# Patient Record
Sex: FEMALE | Race: WHITE | NOT HISPANIC OR LATINO | Employment: FULL TIME | URBAN - METROPOLITAN AREA
[De-identification: names, ages, dates, MRNs, and addresses within clinical notes are randomized per-mention and may not be internally consistent; named-entity substitution may affect disease eponyms.]

---

## 2017-01-31 ENCOUNTER — APPOINTMENT (OUTPATIENT)
Dept: LAB | Facility: MEDICAL CENTER | Age: 57
End: 2017-01-31
Payer: COMMERCIAL

## 2017-01-31 ENCOUNTER — TRANSCRIBE ORDERS (OUTPATIENT)
Dept: ADMINISTRATIVE | Facility: HOSPITAL | Age: 57
End: 2017-01-31

## 2017-01-31 DIAGNOSIS — R53.83 OTHER MALAISE AND FATIGUE: ICD-10-CM

## 2017-01-31 DIAGNOSIS — R53.81 OTHER MALAISE AND FATIGUE: Primary | ICD-10-CM

## 2017-01-31 DIAGNOSIS — R53.83 OTHER MALAISE AND FATIGUE: Primary | ICD-10-CM

## 2017-01-31 DIAGNOSIS — R53.81 OTHER MALAISE AND FATIGUE: ICD-10-CM

## 2017-01-31 DIAGNOSIS — E78.5 HYPERLIPIDEMIA, UNSPECIFIED HYPERLIPIDEMIA TYPE: ICD-10-CM

## 2017-01-31 LAB
25(OH)D3 SERPL-MCNC: 10 NG/ML (ref 30–100)
ALBUMIN SERPL BCP-MCNC: 3.9 G/DL (ref 3.5–5)
ALP SERPL-CCNC: 63 U/L (ref 46–116)
ALT SERPL W P-5'-P-CCNC: 35 U/L (ref 12–78)
ANION GAP SERPL CALCULATED.3IONS-SCNC: 6 MMOL/L (ref 4–13)
AST SERPL W P-5'-P-CCNC: 22 U/L (ref 5–45)
BACTERIA UR QL AUTO: ABNORMAL /HPF
BASOPHILS # BLD AUTO: 0.01 THOUSANDS/ΜL (ref 0–0.1)
BASOPHILS NFR BLD AUTO: 0 % (ref 0–1)
BILIRUB SERPL-MCNC: 0.59 MG/DL (ref 0.2–1)
BILIRUB UR QL STRIP: NEGATIVE
BUN SERPL-MCNC: 12 MG/DL (ref 5–25)
CALCIUM SERPL-MCNC: 8.4 MG/DL (ref 8.3–10.1)
CHLORIDE SERPL-SCNC: 107 MMOL/L (ref 100–108)
CHOLEST SERPL-MCNC: 208 MG/DL (ref 50–200)
CLARITY UR: CLEAR
CO2 SERPL-SCNC: 29 MMOL/L (ref 21–32)
COLOR UR: YELLOW
CREAT SERPL-MCNC: 0.76 MG/DL (ref 0.6–1.3)
EOSINOPHIL # BLD AUTO: 0.14 THOUSAND/ΜL (ref 0–0.61)
EOSINOPHIL NFR BLD AUTO: 3 % (ref 0–6)
ERYTHROCYTE [DISTWIDTH] IN BLOOD BY AUTOMATED COUNT: 13.4 % (ref 11.6–15.1)
GFR SERPL CREATININE-BSD FRML MDRD: >60 ML/MIN/1.73SQ M
GLUCOSE SERPL-MCNC: 89 MG/DL (ref 65–140)
GLUCOSE UR STRIP-MCNC: NEGATIVE MG/DL
HCT VFR BLD AUTO: 38.3 % (ref 34.8–46.1)
HDLC SERPL-MCNC: 56 MG/DL (ref 40–60)
HGB BLD-MCNC: 12.8 G/DL (ref 11.5–15.4)
HGB UR QL STRIP.AUTO: ABNORMAL
KETONES UR STRIP-MCNC: NEGATIVE MG/DL
LDLC SERPL CALC-MCNC: 127 MG/DL (ref 0–100)
LEUKOCYTE ESTERASE UR QL STRIP: ABNORMAL
LYMPHOCYTES # BLD AUTO: 1.86 THOUSANDS/ΜL (ref 0.6–4.47)
LYMPHOCYTES NFR BLD AUTO: 35 % (ref 14–44)
MCH RBC QN AUTO: 30.4 PG (ref 26.8–34.3)
MCHC RBC AUTO-ENTMCNC: 33.4 G/DL (ref 31.4–37.4)
MCV RBC AUTO: 91 FL (ref 82–98)
MONOCYTES # BLD AUTO: 0.35 THOUSAND/ΜL (ref 0.17–1.22)
MONOCYTES NFR BLD AUTO: 7 % (ref 4–12)
MUCOUS THREADS UR QL AUTO: ABNORMAL
NEUTROPHILS # BLD AUTO: 2.93 THOUSANDS/ΜL (ref 1.85–7.62)
NEUTS SEG NFR BLD AUTO: 55 % (ref 43–75)
NITRITE UR QL STRIP: NEGATIVE
NON-SQ EPI CELLS URNS QL MICRO: ABNORMAL /HPF
NRBC BLD AUTO-RTO: 0 /100 WBCS
PH UR STRIP.AUTO: 6 [PH] (ref 4.5–8)
PLATELET # BLD AUTO: 247 THOUSANDS/UL (ref 149–390)
PMV BLD AUTO: 10.4 FL (ref 8.9–12.7)
POTASSIUM SERPL-SCNC: 4.2 MMOL/L (ref 3.5–5.3)
PROT SERPL-MCNC: 7.2 G/DL (ref 6.4–8.2)
PROT UR STRIP-MCNC: NEGATIVE MG/DL
RBC # BLD AUTO: 4.21 MILLION/UL (ref 3.81–5.12)
RBC #/AREA URNS AUTO: ABNORMAL /HPF
SODIUM SERPL-SCNC: 142 MMOL/L (ref 136–145)
SP GR UR STRIP.AUTO: 1.02 (ref 1–1.03)
T3 SERPL-MCNC: 1.1 NG/ML (ref 0.6–1.8)
T4 FREE SERPL-MCNC: 1.03 NG/DL (ref 0.76–1.46)
TRIGL SERPL-MCNC: 127 MG/DL
TSH SERPL DL<=0.05 MIU/L-ACNC: 2.28 UIU/ML (ref 0.36–3.74)
URATE SERPL-MCNC: 5.7 MG/DL (ref 2–6.8)
UROBILINOGEN UR QL STRIP.AUTO: 0.2 E.U./DL
WBC # BLD AUTO: 5.3 THOUSAND/UL (ref 4.31–10.16)
WBC #/AREA URNS AUTO: ABNORMAL /HPF

## 2017-01-31 PROCEDURE — 87086 URINE CULTURE/COLONY COUNT: CPT | Performed by: FAMILY MEDICINE

## 2017-01-31 PROCEDURE — 36415 COLL VENOUS BLD VENIPUNCTURE: CPT

## 2017-01-31 PROCEDURE — 82306 VITAMIN D 25 HYDROXY: CPT

## 2017-01-31 PROCEDURE — 81001 URINALYSIS AUTO W/SCOPE: CPT | Performed by: FAMILY MEDICINE

## 2017-01-31 PROCEDURE — 80061 LIPID PANEL: CPT

## 2017-01-31 PROCEDURE — 84443 ASSAY THYROID STIM HORMONE: CPT

## 2017-01-31 PROCEDURE — 84480 ASSAY TRIIODOTHYRONINE (T3): CPT

## 2017-01-31 PROCEDURE — 84550 ASSAY OF BLOOD/URIC ACID: CPT

## 2017-01-31 PROCEDURE — 85025 COMPLETE CBC W/AUTO DIFF WBC: CPT

## 2017-01-31 PROCEDURE — 80053 COMPREHEN METABOLIC PANEL: CPT

## 2017-01-31 PROCEDURE — 84439 ASSAY OF FREE THYROXINE: CPT

## 2017-02-01 ENCOUNTER — TRANSCRIBE ORDERS (OUTPATIENT)
Dept: ADMINISTRATIVE | Facility: HOSPITAL | Age: 57
End: 2017-02-01

## 2017-02-01 DIAGNOSIS — M81.0 OSTEOPOROSIS: ICD-10-CM

## 2017-02-01 DIAGNOSIS — G47.30 SLEEP APNEA IN ADULT: Primary | ICD-10-CM

## 2017-02-02 LAB — BACTERIA UR CULT: NORMAL

## 2017-02-15 ENCOUNTER — HOSPITAL ENCOUNTER (OUTPATIENT)
Dept: RADIOLOGY | Age: 57
Discharge: HOME | End: 2017-02-15
Payer: COMMERCIAL

## 2017-02-15 DIAGNOSIS — M81.0 OSTEOPOROSIS: ICD-10-CM

## 2017-02-15 PROCEDURE — 77080 DXA BONE DENSITY AXIAL: CPT

## 2017-03-15 ENCOUNTER — HOSPITAL ENCOUNTER (OUTPATIENT)
Dept: SLEEP CENTER | Facility: CLINIC | Age: 57
Discharge: HOME | End: 2017-03-15
Payer: COMMERCIAL

## 2017-03-15 DIAGNOSIS — G47.30 SLEEP APNEA IN ADULT: ICD-10-CM

## 2017-03-15 PROCEDURE — 95810 POLYSOM 6/> YRS 4/> PARAM: CPT

## 2017-03-20 ENCOUNTER — TRANSCRIBE ORDERS (OUTPATIENT)
Dept: SLEEP CENTER | Facility: CLINIC | Age: 57
End: 2017-03-20

## 2017-03-20 DIAGNOSIS — G47.33 OSA (OBSTRUCTIVE SLEEP APNEA): Primary | ICD-10-CM

## 2017-04-12 ENCOUNTER — HOSPITAL ENCOUNTER (OUTPATIENT)
Dept: SLEEP CENTER | Facility: CLINIC | Age: 57
Discharge: HOME | End: 2017-04-12
Payer: COMMERCIAL

## 2017-04-12 DIAGNOSIS — G47.33 OSA (OBSTRUCTIVE SLEEP APNEA): ICD-10-CM

## 2017-04-12 PROCEDURE — 95811 POLYSOM 6/>YRS CPAP 4/> PARM: CPT

## 2017-04-18 ENCOUNTER — HOSPITAL ENCOUNTER (OUTPATIENT)
Dept: SLEEP CENTER | Facility: CLINIC | Age: 57
Discharge: HOME | End: 2017-04-18
Payer: COMMERCIAL

## 2017-04-18 ENCOUNTER — TRANSCRIBE ORDERS (OUTPATIENT)
Dept: SLEEP CENTER | Facility: CLINIC | Age: 57
End: 2017-04-18

## 2017-04-18 DIAGNOSIS — G47.33 OSA (OBSTRUCTIVE SLEEP APNEA): ICD-10-CM

## 2017-04-18 DIAGNOSIS — G47.33 OSA (OBSTRUCTIVE SLEEP APNEA): Primary | ICD-10-CM

## 2017-04-26 ENCOUNTER — APPOINTMENT (OUTPATIENT)
Dept: LAB | Facility: MEDICAL CENTER | Age: 57
End: 2017-04-26
Payer: COMMERCIAL

## 2017-04-26 ENCOUNTER — TRANSCRIBE ORDERS (OUTPATIENT)
Dept: ADMINISTRATIVE | Facility: HOSPITAL | Age: 57
End: 2017-04-26

## 2017-04-26 DIAGNOSIS — E78.5 OTHER AND UNSPECIFIED HYPERLIPIDEMIA: Primary | ICD-10-CM

## 2017-04-26 LAB
CHOLEST SERPL-MCNC: 202 MG/DL (ref 50–200)
HDLC SERPL-MCNC: 50 MG/DL (ref 40–60)
LDLC SERPL CALC-MCNC: 114 MG/DL (ref 0–100)
TRIGL SERPL-MCNC: 192 MG/DL

## 2017-04-26 PROCEDURE — 80061 LIPID PANEL: CPT | Performed by: FAMILY MEDICINE

## 2017-04-26 PROCEDURE — 36415 COLL VENOUS BLD VENIPUNCTURE: CPT | Performed by: FAMILY MEDICINE

## 2017-05-10 ENCOUNTER — ALLSCRIPTS OFFICE VISIT (OUTPATIENT)
Dept: OTHER | Facility: OTHER | Age: 57
End: 2017-05-10

## 2017-05-10 DIAGNOSIS — R14.0 ABDOMINAL DISTENSION (GASEOUS): ICD-10-CM

## 2017-05-10 DIAGNOSIS — E55.9 VITAMIN D DEFICIENCY: ICD-10-CM

## 2017-05-11 ENCOUNTER — APPOINTMENT (OUTPATIENT)
Dept: LAB | Facility: MEDICAL CENTER | Age: 57
End: 2017-05-11
Payer: COMMERCIAL

## 2017-05-11 DIAGNOSIS — E55.9 VITAMIN D DEFICIENCY: ICD-10-CM

## 2017-05-11 LAB — 25(OH)D3 SERPL-MCNC: 12.8 NG/ML (ref 30–100)

## 2017-05-11 PROCEDURE — 36415 COLL VENOUS BLD VENIPUNCTURE: CPT

## 2017-05-11 PROCEDURE — 82306 VITAMIN D 25 HYDROXY: CPT

## 2017-05-12 ENCOUNTER — GENERIC CONVERSION - ENCOUNTER (OUTPATIENT)
Dept: OTHER | Facility: OTHER | Age: 57
End: 2017-05-12

## 2017-05-22 ENCOUNTER — ALLSCRIPTS OFFICE VISIT (OUTPATIENT)
Dept: OTHER | Facility: OTHER | Age: 57
End: 2017-05-22

## 2017-06-22 ENCOUNTER — HOSPITAL ENCOUNTER (OUTPATIENT)
Dept: SLEEP CENTER | Facility: CLINIC | Age: 57
Discharge: HOME | End: 2017-06-22
Payer: COMMERCIAL

## 2017-06-22 ENCOUNTER — TRANSCRIBE ORDERS (OUTPATIENT)
Dept: SLEEP CENTER | Facility: CLINIC | Age: 57
End: 2017-06-22

## 2017-06-22 DIAGNOSIS — G47.33 OSA (OBSTRUCTIVE SLEEP APNEA): ICD-10-CM

## 2017-06-22 DIAGNOSIS — J44.9 OSA AND COPD OVERLAP SYNDROME (HCC): Primary | ICD-10-CM

## 2017-06-22 DIAGNOSIS — G47.33 OSA AND COPD OVERLAP SYNDROME (HCC): Primary | ICD-10-CM

## 2017-07-09 ENCOUNTER — ANESTHESIA EVENT (OUTPATIENT)
Dept: GASTROENTEROLOGY | Facility: HOSPITAL | Age: 57
End: 2017-07-09
Payer: COMMERCIAL

## 2017-07-10 ENCOUNTER — GENERIC CONVERSION - ENCOUNTER (OUTPATIENT)
Dept: OTHER | Facility: OTHER | Age: 57
End: 2017-07-10

## 2017-07-10 ENCOUNTER — HOSPITAL ENCOUNTER (OUTPATIENT)
Facility: HOSPITAL | Age: 57
Setting detail: OUTPATIENT SURGERY
Discharge: HOME | End: 2017-07-10
Attending: INTERNAL MEDICINE | Admitting: INTERNAL MEDICINE
Payer: COMMERCIAL

## 2017-07-10 ENCOUNTER — ANESTHESIA (OUTPATIENT)
Dept: GASTROENTEROLOGY | Facility: HOSPITAL | Age: 57
End: 2017-07-10
Payer: COMMERCIAL

## 2017-07-10 VITALS
BODY MASS INDEX: 26.72 KG/M2 | HEIGHT: 64 IN | RESPIRATION RATE: 16 BRPM | TEMPERATURE: 97.7 F | SYSTOLIC BLOOD PRESSURE: 98 MMHG | WEIGHT: 156.53 LBS | OXYGEN SATURATION: 97 % | HEART RATE: 62 BPM | DIASTOLIC BLOOD PRESSURE: 58 MMHG

## 2017-07-10 DIAGNOSIS — Z12.11 ENCOUNTER FOR SCREENING FOR MALIGNANT NEOPLASM OF COLON: ICD-10-CM

## 2017-07-10 PROCEDURE — 88305 TISSUE EXAM BY PATHOLOGIST: CPT | Performed by: INTERNAL MEDICINE

## 2017-07-10 RX ORDER — SODIUM CHLORIDE, SODIUM LACTATE, POTASSIUM CHLORIDE, CALCIUM CHLORIDE 600; 310; 30; 20 MG/100ML; MG/100ML; MG/100ML; MG/100ML
125 INJECTION, SOLUTION INTRAVENOUS CONTINUOUS
Status: DISCONTINUED | OUTPATIENT
Start: 2017-07-10 | End: 2017-07-10 | Stop reason: HOSPADM

## 2017-07-10 RX ORDER — PROPOFOL 10 MG/ML
INJECTION, EMULSION INTRAVENOUS CONTINUOUS PRN
Status: DISCONTINUED | OUTPATIENT
Start: 2017-07-10 | End: 2017-07-10 | Stop reason: SURG

## 2017-07-10 RX ORDER — PROPOFOL 10 MG/ML
INJECTION, EMULSION INTRAVENOUS AS NEEDED
Status: DISCONTINUED | OUTPATIENT
Start: 2017-07-10 | End: 2017-07-10 | Stop reason: SURG

## 2017-07-10 RX ADMIN — PROPOFOL 30 MG: 10 INJECTION, EMULSION INTRAVENOUS at 12:27

## 2017-07-10 RX ADMIN — SODIUM CHLORIDE, SODIUM LACTATE, POTASSIUM CHLORIDE, AND CALCIUM CHLORIDE 125 ML/HR: .6; .31; .03; .02 INJECTION, SOLUTION INTRAVENOUS at 11:53

## 2017-07-10 RX ADMIN — PROPOFOL 120 MCG/KG/MIN: 10 INJECTION, EMULSION INTRAVENOUS at 12:14

## 2017-07-10 RX ADMIN — PROPOFOL 120 MG: 10 INJECTION, EMULSION INTRAVENOUS at 12:12

## 2017-07-11 ENCOUNTER — APPOINTMENT (OUTPATIENT)
Dept: LAB | Facility: MEDICAL CENTER | Age: 57
End: 2017-07-11
Payer: COMMERCIAL

## 2017-07-11 DIAGNOSIS — R14.0 ABDOMINAL DISTENSION (GASEOUS): ICD-10-CM

## 2017-07-11 PROCEDURE — 36415 COLL VENOUS BLD VENIPUNCTURE: CPT

## 2017-07-11 PROCEDURE — 83516 IMMUNOASSAY NONANTIBODY: CPT

## 2017-07-11 PROCEDURE — 82784 ASSAY IGA/IGD/IGG/IGM EACH: CPT

## 2017-07-11 PROCEDURE — 86255 FLUORESCENT ANTIBODY SCREEN: CPT

## 2017-07-13 LAB
ENDOMYSIUM IGA SER QL: NEGATIVE
GLIADIN PEPTIDE IGA SER-ACNC: 3 UNITS (ref 0–19)
GLIADIN PEPTIDE IGG SER-ACNC: 2 UNITS (ref 0–19)
IGA SERPL-MCNC: 107 MG/DL (ref 87–352)
TTG IGA SER-ACNC: <2 U/ML (ref 0–3)
TTG IGG SER-ACNC: <2 U/ML (ref 0–5)

## 2017-07-19 ENCOUNTER — GENERIC CONVERSION - ENCOUNTER (OUTPATIENT)
Dept: OTHER | Facility: OTHER | Age: 57
End: 2017-07-19

## 2017-08-12 DIAGNOSIS — E55.9 VITAMIN D DEFICIENCY: ICD-10-CM

## 2017-08-24 ENCOUNTER — GENERIC CONVERSION - ENCOUNTER (OUTPATIENT)
Dept: OTHER | Facility: OTHER | Age: 57
End: 2017-08-24

## 2017-11-27 ENCOUNTER — LAB REQUISITION (OUTPATIENT)
Dept: LAB | Facility: HOSPITAL | Age: 57
End: 2017-11-27
Payer: COMMERCIAL

## 2017-11-27 ENCOUNTER — ALLSCRIPTS OFFICE VISIT (OUTPATIENT)
Dept: OTHER | Facility: OTHER | Age: 57
End: 2017-11-27

## 2017-11-27 DIAGNOSIS — Z01.419 ENCOUNTER FOR GYNECOLOGICAL EXAMINATION WITHOUT ABNORMAL FINDING: ICD-10-CM

## 2017-11-27 DIAGNOSIS — Z12.4 ENCOUNTER FOR SCREENING FOR MALIGNANT NEOPLASM OF CERVIX: ICD-10-CM

## 2017-11-27 PROCEDURE — G0145 SCR C/V CYTO,THINLAYER,RESCR: HCPCS | Performed by: OBSTETRICS & GYNECOLOGY

## 2017-11-27 PROCEDURE — 87624 HPV HI-RISK TYP POOLED RSLT: CPT | Performed by: OBSTETRICS & GYNECOLOGY

## 2017-12-01 LAB — HPV RRNA GENITAL QL NAA+PROBE: NORMAL

## 2017-12-04 ENCOUNTER — GENERIC CONVERSION - ENCOUNTER (OUTPATIENT)
Dept: OTHER | Facility: OTHER | Age: 57
End: 2017-12-04

## 2017-12-04 LAB
LAB AP GYN PRIMARY INTERPRETATION: NORMAL
Lab: NORMAL

## 2017-12-07 ENCOUNTER — TRANSCRIBE ORDERS (OUTPATIENT)
Dept: URGENT CARE | Facility: MEDICAL CENTER | Age: 57
End: 2017-12-07

## 2017-12-07 ENCOUNTER — GENERIC CONVERSION - ENCOUNTER (OUTPATIENT)
Dept: OTHER | Facility: OTHER | Age: 57
End: 2017-12-07

## 2017-12-07 ENCOUNTER — APPOINTMENT (OUTPATIENT)
Dept: RADIOLOGY | Facility: MEDICAL CENTER | Age: 57
End: 2017-12-07
Payer: COMMERCIAL

## 2017-12-07 DIAGNOSIS — M70.60 TENDINITIS OF TROCHANTERIC REGION OF HIP, UNSPECIFIED LATERALITY: Primary | ICD-10-CM

## 2017-12-07 DIAGNOSIS — M81.0 SENILE OSTEOPOROSIS: ICD-10-CM

## 2017-12-07 PROCEDURE — 73630 X-RAY EXAM OF FOOT: CPT

## 2017-12-07 PROCEDURE — 73130 X-RAY EXAM OF HAND: CPT

## 2017-12-07 PROCEDURE — 73521 X-RAY EXAM HIPS BI 2 VIEWS: CPT

## 2017-12-07 PROCEDURE — 73110 X-RAY EXAM OF WRIST: CPT

## 2017-12-18 ENCOUNTER — HOSPITAL ENCOUNTER (OUTPATIENT)
Dept: RADIOLOGY | Age: 57
Discharge: HOME | End: 2017-12-18
Payer: COMMERCIAL

## 2017-12-18 DIAGNOSIS — Z12.4 ENCOUNTER FOR SCREENING FOR MALIGNANT NEOPLASM OF CERVIX: ICD-10-CM

## 2017-12-18 PROCEDURE — G0202 SCR MAMMO BI INCL CAD: HCPCS

## 2017-12-19 ENCOUNTER — GENERIC CONVERSION - ENCOUNTER (OUTPATIENT)
Dept: OTHER | Facility: OTHER | Age: 57
End: 2017-12-19

## 2018-01-12 NOTE — RESULT NOTES
Verified Results  (1) VITAMIN D 25-HYDROXY [id number] 07:53AM Otilio Shabazz Order Number: [id number]     Test Name Result Flag Reference   VIT D 25-HYDROX 12 8 ng/mL L 30 0-100 0   This assay is a certified procedure of the CDC Vitamin D Standardization Certification Program (VDSCP)     Deficiency <20ng/ml   Insufficiency 20-30ng/ml   Sufficient  ng/ml     *Patients undergoing fluorescein dye angiography may retain small amounts of fluorescein in the body for 48-72 hours post procedure  Samples containing fluorescein can produce falsely elevated Vitamin D values  If the patient had this procedure, a specimen should be resubmitted post fluorescein clearance  Plan  Osteoporosis    · Alendronate Sodium 70 MG Oral Tablet; TAKE 1 TABLET ONCE WEEKLY  Vitamin D deficiency    · Vitamin D (Ergocalciferol) 01278 UNIT Oral Capsule; TAKE 1 CAPSULE WEEKLY   · (1) VITAMIN D 25-HYDROXY; Status:Active;  Requested for:[id number];

## 2018-01-14 VITALS
WEIGHT: 158.5 LBS | HEART RATE: 76 BPM | SYSTOLIC BLOOD PRESSURE: 132 MMHG | BODY MASS INDEX: 27.06 KG/M2 | HEIGHT: 64 IN | DIASTOLIC BLOOD PRESSURE: 68 MMHG | RESPIRATION RATE: 18 BRPM

## 2018-01-14 VITALS
WEIGHT: 160 LBS | BODY MASS INDEX: 27.46 KG/M2 | HEART RATE: 77 BPM | DIASTOLIC BLOOD PRESSURE: 58 MMHG | TEMPERATURE: 98.4 F | OXYGEN SATURATION: 98 % | SYSTOLIC BLOOD PRESSURE: 118 MMHG

## 2018-01-18 NOTE — RESULT NOTES
Discussion/Summary   Please inform the patient that one polyp removed was a tubular adenoma, there was no high-grade dysplasia and no cancer  A tubular adenoma is a precancerous polyp  These polyps, if left alone, have a small risk of developing a cancer over 5-10 years  Your polyp has been completely removed  Small polyp in the rectum was a benign hyperplastic polyp  I recommend repeat colonoscopy in 5 years, please have the patient call with any questions or concerns  Verified Results  (1) TISSUE EXAM [id number] 12:22PM Giancarlo Jen     Test Name Result Flag Reference   LAB AP CASE REPORT (Report)     Surgical Pathology Report             Case: [id number]                   Authorizing Provider: Teressa Kovacs MD      Collected:      07/10/2017 1222        Ordering Location:   [facility]    Received:      07/10/2017 [address] [place] [address] Ct Endoscopy                               Pathologist:      Fabiola Fuentes MD                                Specimens:  A) - Polyp, Colorectal, Bx Ascending colon polyp                            B) - Polyp, Colorectal, Rectum   LAB AP FINAL DIAGNOSIS (Report)     A  Polyp, Colorectal, Biopsy polypectomy Ascending colon polyp:  - Tubular adenoma  - Negative for high grade dysplasia and malignancy  B  Polyp, Colorectal, Rectum polypectomy:  - Hyperplastic polyp   - Negative for dysplasia and malignancy  Interpretation performed at [facility], [address]  Electronically signed by Fabiola Fuentes MD on 7/13/2017 at 6:07 PM   LAB AP SURGICAL ADDITIONAL INFORMATION (Report)     These tests were developed and their performance characteristics   determined by [facility] or [facility] [employer]  They may not be cleared or approved by the [place]  Food and   Drug Administration  The FDA has determined that such clearance or   approval is not necessary   These tests are used for clinical purposes  They should not be regarded as investigational or for research  This   laboratory has been approved by [place], designated as a high-complexity   laboratory and is qualified to perform these tests  LAB AP GROSS DESCRIPTION (Report)     A  The specimen is received in formalin, labeled with the patient's name   and hospital number, and is designated biopsy ascending colon polyp, are   three irregularly shaped fragments of tan soft tissue each measuring 0 2   cm in greatest dimension  Entirely submitted  One cassette  B  The specimen is received in formalin, labeled with the patient's name   and hospital number, and is designated rectum, is a single irregularly   shaped fragment of tan soft tissue measuring 0 2 cm in greatest dimension  Entirely submitted  One cassette  Note: The estimated total formalin fixation time based upon information   provided by the submitting clinician and the standard processing schedule   is 14 0 hours  RLR   LAB AP CLINICAL INFORMATION      A single diminutive polyp found in the ascending colon;   removed by cold biopsy polypectomy  A single polyp found in the rectum;   removed by cold snare polypectomy

## 2018-01-22 VITALS
HEART RATE: 81 BPM | HEIGHT: 63 IN | BODY MASS INDEX: 28.62 KG/M2 | WEIGHT: 161.5 LBS | DIASTOLIC BLOOD PRESSURE: 60 MMHG | SYSTOLIC BLOOD PRESSURE: 100 MMHG

## 2018-01-23 NOTE — RESULT NOTES
Verified Results  * MAMMO SCREENING BILATERAL W CAD [id number] 02:31PM Tamanna Zaidi Order Number: [id number]    - Patient Instructions: To schedule this appointment, please contact Central Scheduling at [phone]  Do not wear any perfume, powder, lotion or deodorant on breast or underarm area  Please bring your doctors order, referral (if needed) and insurance information with you on the day of the test  Failure to bring this information may result in this test being rescheduled  Arrive 15 minutes prior to your appointment time to register  On the day of your test, please bring any prior mammogram or breast studies with you that were not performed at a [facility]  Failure to bring prior exams may result in your test needing to be rescheduled  Test Name Result Flag Reference   MAMMO SCREENING BILATERAL W CAD (Report)     Patient History:   Patient is postmenopausal, has history of cancer in the right    breast at age 50, and had previous chest radiation therapy at age   50  Family history of premenopausal breast cancer at age 40 in    sister, breast cancer in maternal aunt, breast cancer in maternal   cousin, prostate cancer at age 79 in father  Radiation therapy of the right breast, March 2009  Malignant    excisional biopsy of the right breast, February 6, 2009  Took hormonal contraceptives for 10 years  Took tamoxifen for 5    years  Took unspecified hormones for 5 years  Patient has never smoked  Patient's BMI is 27 3  Reason for exam: screening, asymptomatic  Mammo Screening Bilateral W CAD: December 18, 2017 - Check In #:    [de-identified]   Bilateral MLO and CC view(s) were taken  XCCL view(s) were taken   of the right breast      Technologist: DINESH Darnell (DINESH)(M)   Prior study comparison: December 12, 2016, mammo screening    bilateral W CAD performed at [address] [place] [address]   June 8, 2016, mammo diagnostic left W CAD, performed at [facility] [address]  December 9, 2015, bilateral [facility] digtl    scrn mammo w/CAD, performed at [address]  May 14, 2014, bilateral WB digitl bilat sh[place]e, performed    at [address]  May 13, 2013, bilateral WB   digitl bilat shanae, performed at [address]  May 10, 2012, bilateral WB digitl bilat shanae, performed    at [address]  The breast tissue is almost entirely fat  Bilateral digital mammography was performed  No dominant soft    tissue mass, architectural distortion or suspicious    calcifications are noted in either breast  The skin and nipple    contours are within normal limits  No evidence of malignancy  No significant changes when compared with prior studies  ACR BI-RADSï¾® Assessments: BiRad:1 - Negative     Recommendation:   Routine screening mammogram of both breasts in 1 year  Analyzed by CAD     The patient is scheduled in a reminder system for screening    mammography  8-10% of cancers will be missed on mammography  Management of a    palpable abnormality must be based on clinical grounds  Patients   will be notified of their results via letter from our facility  Accredited by [employer] of Radiology and FDA  Transcription Location:  [place] [address]: [id number]     Risk Value(s):   Myriad Table: 10 4%, MRS : Based on personal and/or    family history, consideration of hereditary risk assessment may    be warranted     Signed by:   Travis Cade MD   12/19/17       Signatures   Electronically signed by : LEANN Moran ; Dec 19 2017  4:08PM EST                       (Author)

## 2018-01-23 NOTE — RESULT NOTES
Verified Results  (1) THIN PREP PAP WITH IMAGING 27Nov2017 09:01AM Bina Cedillo Order Number: [id number]     Test Name Result Flag Reference   LAB AP CASE REPORT (Report)     Gynecologic Cytology Report            Case: [id number]                  Authorizing Provider: Becka Moreno MD    Collected:      11/27/2017 0901        First Screen:     YURY Swan    Received:      11/29/2017 1404        Specimen:  LIQUID-BASED PAP, SCREENING, Endocervical   HPV HIGH RISK RESULT (Report)     HPV, High Risk: HPV NEG, HPV16 NEG, HPV18 NEG      Other High Risk HPV Negative, HPV 16 Negative, HPV 18 Negative  HPV types: 16,18,31,33,35,39,45,51,52,56,58,59,66 and 68 DNA are undetectable or below the pre-set threshold  [employer] FDA approved Steven 4800 is utilized with strict adherence to the manufacturers instruction  manual to test for the presence of High-Risk HPV DNA, as well as HPV 16 and HPV 18  This instrument  has been validated by our laboratory and/or by the   A negative result does not preclude the presence of HPV infection because results depend on adequate  specimen collection, absence of inhibitors and sufficient DNA to be detected  Additionally, HPV negative  results are not intended to prevent women from proceeding to colposcopy if clinically warranted  Positive HPV test results indicate the presence of any one or more of the high risk types, but since patients  are often co-infected with low-risk types it does not rule out the presence of low-risk types in patients  with mixed infections  LAB AP GYN PRIMARY INTERPRETATION      Negative for intraepithelial lesion or malignancy  Electronically signed by YURY Swan on 12/4/2017 at 1:35 PM   LAB AP GYN SPECIMEN ADEQUACY      Satisfactory for evaluation  Endocervical/transformation zone component present     LAB AP GYN ADDITIONAL INFORMATION (Report)     [employer] FDA approved ,  and ThinPrep Imaging System are   utilized with strict adherence to the 's instruction manual to   prepare gynecologic and non-gynecologic cytology specimens for the   production of ThinPrep slides as well as for gynecologic ThinPrep imaging  These processes have been validated by our laboratory and/or by the     The Pap test is not a diagnostic procedure and should not be used as the   sole means to detect cervical cancer  It is only a screening procedure to   aid in the detection of cervical cancer and its precursors  Both   false-negative and false-positive results have been experienced  Your   patient's test result should be interpreted in this context together with   the history and clinical findings         Signatures   Electronically signed by : LEANN Mendez ; Dec  4 2017  3:56PM EST                       (Author)

## 2018-01-23 NOTE — MISCELLANEOUS
Dear Alyson Horn,  I am happy to report that your Pap test collected during your recent  exam was normal  The HPV test was negative  Based on the most recent guidelines, you will be due for your next Pap test and HPV testing in 3 years  However, I will continue to see you annually for your [facility] visit  If you have any questions,  please do not hesitate to contact my office      Sincerely,    Juan Duran MD

## 2018-03-10 ENCOUNTER — TRANSCRIBE ORDERS (OUTPATIENT)
Dept: ADMINISTRATIVE | Facility: HOSPITAL | Age: 58
End: 2018-03-10

## 2018-03-10 ENCOUNTER — APPOINTMENT (OUTPATIENT)
Dept: LAB | Facility: MEDICAL CENTER | Age: 58
End: 2018-03-10
Payer: COMMERCIAL

## 2018-03-10 DIAGNOSIS — M81.0 SENILE OSTEOPOROSIS: Primary | ICD-10-CM

## 2018-03-10 LAB
25(OH)D3 SERPL-MCNC: 83.6 NG/ML (ref 30–100)
CALCIUM SERPL-MCNC: 8.9 MG/DL (ref 8.3–10.1)
PTH-INTACT SERPL-MCNC: 64.8 PG/ML (ref 14–72)

## 2018-03-10 PROCEDURE — 83970 ASSAY OF PARATHORMONE: CPT | Performed by: INTERNAL MEDICINE

## 2018-03-10 PROCEDURE — 36415 COLL VENOUS BLD VENIPUNCTURE: CPT | Performed by: INTERNAL MEDICINE

## 2018-03-10 PROCEDURE — 82306 VITAMIN D 25 HYDROXY: CPT | Performed by: INTERNAL MEDICINE

## 2018-03-10 PROCEDURE — 82310 ASSAY OF CALCIUM: CPT | Performed by: INTERNAL MEDICINE

## 2018-05-08 ENCOUNTER — TELEPHONE (OUTPATIENT)
Dept: SLEEP CENTER | Facility: CLINIC | Age: 58
End: 2018-05-08

## 2018-05-08 DIAGNOSIS — G47.33 OSA (OBSTRUCTIVE SLEEP APNEA): Primary | ICD-10-CM

## 2018-05-09 DIAGNOSIS — G47.33 OSA (OBSTRUCTIVE SLEEP APNEA): Primary | ICD-10-CM

## 2018-05-17 ENCOUNTER — OFFICE VISIT (OUTPATIENT)
Dept: FAMILY MEDICINE CLINIC | Facility: MEDICAL CENTER | Age: 58
End: 2018-05-17
Payer: COMMERCIAL

## 2018-05-17 VITALS
WEIGHT: 162 LBS | BODY MASS INDEX: 28.7 KG/M2 | HEART RATE: 68 BPM | SYSTOLIC BLOOD PRESSURE: 108 MMHG | RESPIRATION RATE: 16 BRPM | DIASTOLIC BLOOD PRESSURE: 70 MMHG

## 2018-05-17 DIAGNOSIS — M79.642 PAIN IN BOTH HANDS: Primary | ICD-10-CM

## 2018-05-17 DIAGNOSIS — M79.641 PAIN IN BOTH HANDS: Primary | ICD-10-CM

## 2018-05-17 PROBLEM — E55.9 VITAMIN D DEFICIENCY: Status: ACTIVE | Noted: 2017-05-10

## 2018-05-17 PROBLEM — J30.89 ENVIRONMENTAL AND SEASONAL ALLERGIES: Status: ACTIVE | Noted: 2017-05-10

## 2018-05-17 PROBLEM — M81.0 OSTEOPOROSIS: Status: ACTIVE | Noted: 2017-05-10

## 2018-05-17 PROBLEM — G47.33 OBSTRUCTIVE SLEEP APNEA: Status: ACTIVE | Noted: 2017-05-23

## 2018-05-17 PROBLEM — E78.5 HYPERLIPIDEMIA: Status: ACTIVE | Noted: 2017-05-10

## 2018-05-17 PROCEDURE — 99213 OFFICE O/P EST LOW 20 MIN: CPT | Performed by: FAMILY MEDICINE

## 2018-05-17 RX ORDER — CLOBETASOL PROPIONATE 0.5 MG/G
AEROSOL, FOAM TOPICAL
COMMUNITY
Start: 2018-03-23 | End: 2019-07-29

## 2018-05-17 RX ORDER — FLUOCINOLONE ACETONIDE 0.11 MG/ML
OIL TOPICAL
COMMUNITY
Start: 2018-03-16 | End: 2019-07-29

## 2018-05-17 NOTE — PROGRESS NOTES
Assessment/Plan:    No problem-specific Assessment & Plan notes found for this encounter  Diagnoses and all orders for this visit:    Pain in both hands  -     EMG 2 Limb Upper Extremity; Future    Other orders  -     Fluocinolone Acetonide Scalp 0 01 % OIL;   -     clobetasol (OLUX) 0 05 % topical foam;   -     Calcium-Magnesium-Vitamin D (CITRACAL CALCIUM+D PO); Take by mouth    Symptoms are likely due to carpal tunnel syndrome  Will have patient get an EMG for confirmation  Following this we will likely have her see Orthopedics for intervention  Follow-up within six months for physical exam or sooner if needed  Subjective:      Patient ID: Dede Diaz is a 62 y o  female  Patient presents with bilateral wrist and hand pain  This has been going on for about one year  Symptoms have been getting worse over the past few months  Patient does use the computer regularly  She does have hand weakness worse in the left hand  She does have trouble gripping things  She does have shooting pain into the pinky and ring finger on both sides  She also has pain at the base of the bilateral thumbs  Pain will also shoot up the forearm and into the elbows  Patient is worried she has carpal tunnel syndrome  The following portions of the patient's history were reviewed and updated as appropriate: allergies, current medications and problem list     Review of Systems   Constitutional: Negative for fever  Respiratory: Negative for shortness of breath  Cardiovascular: Negative for chest pain  Objective:      /70 (Cuff Size: Standard)   Pulse 68   Resp 16   Wt 73 5 kg (162 lb)   BMI 28 70 kg/m²          Physical Exam   Constitutional: She appears well-developed and well-nourished  Musculoskeletal:   Bilateral hands and wrists with no obvious abnormality on inspection  There is decreased  strength of the left hand when compared to the right hand    There is positive Tinel sign of the left wrist but not the right wrist   Sensation remains intact of the bilateral hands and fingers  Capillary refill of the bilateral fingers is less than 2 sec

## 2018-07-23 ENCOUNTER — HOSPITAL ENCOUNTER (OUTPATIENT)
Dept: RADIOLOGY | Facility: CLINIC | Age: 58
Discharge: HOME | End: 2018-07-23
Payer: COMMERCIAL

## 2018-07-23 DIAGNOSIS — M79.642 PAIN IN BOTH HANDS: ICD-10-CM

## 2018-07-23 DIAGNOSIS — M79.641 PAIN IN BOTH HANDS: ICD-10-CM

## 2018-07-23 PROCEDURE — 95886 MUSC TEST DONE W/N TEST COMP: CPT

## 2018-07-23 PROCEDURE — 95886 MUSC TEST DONE W/N TEST COMP: CPT | Performed by: PHYSICAL MEDICINE & REHABILITATION

## 2018-07-23 PROCEDURE — 95910 NRV CNDJ TEST 7-8 STUDIES: CPT | Performed by: PHYSICAL MEDICINE & REHABILITATION

## 2018-07-23 NOTE — PROCEDURES
Procedures      Electromyogram and Nerve Conduction Velocity Procedure Note    HX:    This is a 72-year-old right-hand-dominant female referred for electrodiagnostic study of both upper extremities  She complains of symptoms in the left hand for greater than the right there is numbness in the 1st 3rd 4th and 5th digits of the left hand and numbness in the right side is limited to the thumb  She also describes pain at the base of both thumbs and has been told she has "arthritis"  She does have a burning pain in the left shoulder there is some radiation of pain from the left wrist to the elbow it is noted she does repetitive stapling with the left hand  There is no other history of trauma     PMH:    breast cancer 2009 osteoporosis  Exam:    on exam positive Tinel sign bilaterally but there is no motor sensory loss reflexes are intact no long tract signs no atrophy fasciculations or tremors  There is hypertrophy of the carpometacarpal joints with pain with range of motion  Procedure:  Verbal informed consent was obtained as with all electrodiagnostic medicine patients  As with all patients this patient was informed that they may terminate the  examine at any time  Patient tolerated the procedure well with no adverse effects reported or observed  Findings:  Please see the [employer] data printout      There is mild partial conduction block of the left median sensory fibers with wrist stimulation and preserved amplitude with stimulation at the palm, the left median motor distal latency was normal but there is reduced amplitude of 2 4 millivolts, there is normal conduction to the 4 limb 56 meters/second with stimulation in the palm the amplitude as measured to the APB was increased to 7 2 which is normal   Studies of the left ulnar nerve including motor and sensory fibers and the right ulnar nerve including motor and sensory fibers were entirely normal   The left radial sensory fibers demonstrated normal findings as well  Electromyography  There is very mildly increased insertional activity in the left abductor pollicis brevis and there is decreased motor unit recruitment at maximal effort  Remainder of the muscles sampled were entirely normal as were the cervical paraspinals  Conclusion:    1  There is evidence for acute involvement of the left median nerve at the wrist involving both motor and sensory fibers  These electrical findings supports clinical diagnosis of an acute "carpal tunnel syndrome"  This is moderate in degree  The right median nerve demonstrates normal findings  2  There is no electrophysiologic data in the nerves and muscles tested today indicated or suggest a cervical radiculopathy, plexopathy, or large fiber polyneuropathy  Recommendations:       Hand surgery evaluation is advised

## 2018-08-10 ENCOUNTER — OFFICE VISIT (OUTPATIENT)
Dept: FAMILY MEDICINE CLINIC | Facility: MEDICAL CENTER | Age: 58
End: 2018-08-10
Payer: COMMERCIAL

## 2018-08-10 VITALS
DIASTOLIC BLOOD PRESSURE: 82 MMHG | BODY MASS INDEX: 29.26 KG/M2 | HEART RATE: 72 BPM | OXYGEN SATURATION: 98 % | SYSTOLIC BLOOD PRESSURE: 120 MMHG | WEIGHT: 165.2 LBS

## 2018-08-10 DIAGNOSIS — H53.9 ABNORMAL VISION OF BOTH EYES: ICD-10-CM

## 2018-08-10 DIAGNOSIS — E78.5 HYPERLIPIDEMIA, UNSPECIFIED HYPERLIPIDEMIA TYPE: ICD-10-CM

## 2018-08-10 DIAGNOSIS — M81.0 OSTEOPOROSIS, UNSPECIFIED OSTEOPOROSIS TYPE, UNSPECIFIED PATHOLOGICAL FRACTURE PRESENCE: ICD-10-CM

## 2018-08-10 DIAGNOSIS — Z00.00 PHYSICAL EXAM, ANNUAL: Primary | ICD-10-CM

## 2018-08-10 DIAGNOSIS — E55.9 VITAMIN D DEFICIENCY: ICD-10-CM

## 2018-08-10 DIAGNOSIS — G56.02 CARPAL TUNNEL SYNDROME ON LEFT: ICD-10-CM

## 2018-08-10 PROCEDURE — 99396 PREV VISIT EST AGE 40-64: CPT | Performed by: FAMILY MEDICINE

## 2018-08-10 NOTE — PROGRESS NOTES
Assessment/Plan:    No problem-specific Assessment & Plan notes found for this encounter  Diagnoses and all orders for this visit:    Physical exam, annual  Patient appears to be doing well overall  Continuation of healthy lifestyle encouraged  Continued regular follow-up with gynecology recommended  Carpal tunnel syndrome on left  -     Ambulatory referral to Orthopedic Surgery; Future  Patient has left carpal tunnel syndrome based on recent EMG testing  Will have her see orthopedic hand surgery for further evaluation  Abnormal vision of both eyes  -     Ambulatory referral to Optometry; Future  Etiology of the abnormal vision unclear  I will have patient see Optometry for further evaluation  Vitamin D deficiency  -     Vitamin D 25 hydroxy; Future  Check labs to assess levels  Hyperlipidemia, unspecified hyperlipidemia type  -     Comprehensive metabolic panel; Future  -     Lipid Panel with Direct LDL reflex; Future  Fasting labs recommended to check cholesterol, kidney function and liver function as well as sugars  Patient was agreeable and order placed  Osteoporosis, unspecified osteoporosis type, unspecified pathological fracture presence  Patient currently on Fosamax  I had recommended she go see Rheumatology due to history of breast cancer  If her rheumatologist is okay with me taking of the medication I will be more than happy to do so  Other orders  -     Discontinue: SOAP & CLEANSERS EX; 10 %    Follow-up in one year or sooner if needed  Subjective:      Patient ID: Chyna Dickinson is a 62 y o  female  Patient presents for a physical exam   She does not smoke  She does not use drugs  She drinks alcohol rarely  She is followed by Dermatology  She is followed by Rheumatology for osteoporosis but would rather come here for management  She is on Fosamax weekly    She does have a gynecologist   She did go for EMG testing and was told she has left carpal tunnel syndrome  She does need to see a hand specialist   She has been getting blurry spots in her vision for the past month  Has not yet been to see the eye doctor  Patient otherwise has no acute concerns  She would like some blood testing including vitamin-D testing  The following portions of the patient's history were reviewed and updated as appropriate: allergies, current medications, past family history, past medical history, past social history, past surgical history and problem list     Review of Systems   Constitutional: Negative for fever  Eyes: Positive for visual disturbance  Respiratory: Negative for shortness of breath  Cardiovascular: Negative for chest pain  Gastrointestinal: Negative for abdominal pain and blood in stool  Genitourinary: Negative for dysuria  Musculoskeletal: Positive for arthralgias and myalgias  Skin: Negative for rash  Neurological: Negative for headaches  Objective:      /82 (BP Location: Left arm, Patient Position: Sitting, Cuff Size: Adult)   Pulse 72   Wt 74 9 kg (165 lb 3 2 oz)   SpO2 98%   BMI 29 26 kg/m²          Physical Exam   Constitutional: She is oriented to person, place, and time  Vital signs are normal  She appears well-developed and well-nourished  HENT:   Head: Normocephalic and atraumatic  Right Ear: Tympanic membrane, external ear and ear canal normal    Left Ear: Tympanic membrane, external ear and ear canal normal    Nose: Nose normal    Mouth/Throat: Uvula is midline, oropharynx is clear and moist and mucous membranes are normal    Eyes: Conjunctivae, EOM and lids are normal  Pupils are equal, round, and reactive to light  Neck: Trachea normal  Neck supple  No thyromegaly present  Cardiovascular: Normal rate, regular rhythm, S1 normal and S2 normal     No murmur heard  Pulmonary/Chest: Effort normal and breath sounds normal    Abdominal: Soft  Bowel sounds are normal  There is no hepatosplenomegaly  There is no tenderness  Lymphadenopathy:     She has no cervical adenopathy  Neurological: She is alert and oriented to person, place, and time  Skin: Skin is warm and dry  Psychiatric: She has a normal mood and affect   Her speech is normal and behavior is normal  Judgment and thought content normal  Cognition and memory are normal

## 2018-08-20 ENCOUNTER — OFFICE VISIT (OUTPATIENT)
Dept: OBGYN CLINIC | Facility: MEDICAL CENTER | Age: 58
End: 2018-08-20
Payer: COMMERCIAL

## 2018-08-20 VITALS — HEIGHT: 64 IN | WEIGHT: 166 LBS | BODY MASS INDEX: 28.34 KG/M2

## 2018-08-20 DIAGNOSIS — M19.049 CMC ARTHRITIS: Primary | ICD-10-CM

## 2018-08-20 DIAGNOSIS — G56.02 CARPAL TUNNEL SYNDROME ON LEFT: ICD-10-CM

## 2018-08-20 PROCEDURE — 99204 OFFICE O/P NEW MOD 45 MIN: CPT | Performed by: PHYSICIAN ASSISTANT

## 2018-08-20 PROCEDURE — 20605 DRAIN/INJ JOINT/BURSA W/O US: CPT | Performed by: PHYSICIAN ASSISTANT

## 2018-08-20 RX ADMIN — LIDOCAINE HYDROCHLORIDE 2 ML: 10 INJECTION, SOLUTION INFILTRATION; PERINEURAL at 15:58

## 2018-08-20 RX ADMIN — TRIAMCINOLONE ACETONIDE 20 MG: 40 INJECTION, SUSPENSION INTRA-ARTICULAR; INTRAMUSCULAR at 15:58

## 2018-08-20 RX ADMIN — LIDOCAINE HYDROCHLORIDE 0.5 ML: 10 INJECTION, SOLUTION INFILTRATION; PERINEURAL at 15:58

## 2018-08-20 RX ADMIN — Medication 0.05 MEQ: at 15:58

## 2018-08-20 NOTE — PROGRESS NOTES
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 who presents with left hand and wrist pain and numbness, as well as bilateral thumb pain  She complains of left sided numbness and tingling at night, which wake her up 1-2 times a night for the last year  She has not tried a wrist brace at night  She has never had cortisone injections  She constantly drops things and has difficulty opening jars  Patient had an EMG at [facility] in July 2018  Patient sees Rheumatology (Dr Gildardo Keane) for osteoporosis  PAST MEDICAL HISTORY:  Past Medical History:   Diagnosis Date    Cancer [facility])     Right breast cancer R lymph node bx neg  Last assessed: 6/4/13    Carpal tunnel syndrome     Kidney stone     Last assessed: 1/7/16    Osteoporosis     Sleep apnea        PAST SURGICAL HISTORY:  Past Surgical History:   Procedure Laterality Date    BLADDER SUSPENSION      BREAST LUMPECTOMY Right     CHOLECYSTECTOMY      COLONOSCOPY      LITHOTRIPSY Left     [place] COLONOSCOPY FLX DX W/COLLJ SPEC WHEN PFRMD N/A 7/10/2017    Procedure: COLONOSCOPY;  Surgeon: Glo Sandhu MD;  [facility];   Service: Gastroenterology    SENTINEL LYMPH NODE BIOPSY         FAMILY HISTORY:  Family History   Problem Relation Age of Onset    Skin cancer Mother    [place] Hypertension Mother     Skin cancer Father     Hypertension Father     Diabetes Sister         Mellitus    Breast cancer Sister     Uterine cancer Sister     Skin cancer Sister     Colon polyps Sister         Sigmoid    Cancer Family     Thyroid disease Family         Disorder       SOCIAL HISTORY:  Social History   Substance Use Topics    Smoking status: Never Smoker    Smokeless tobacco: Never Used    Alcohol use Yes      Comment: Rare       MEDICATIONS:    Current Outpatient Prescriptions:     Alendronate Sodium (FOSAMAX PO), Take 700 mg by mouth once a week, Disp: , Rfl:     Calcium-Magnesium-Vitamin D (CITRACAL CALCIUM+D PO), Take by mouth, Disp: , Rfl:     Cholecalciferol (VITAMIN D PO), Take 50,000 Units by mouth once a week, Disp: , Rfl:     clobetasol (OLUX) 0 05 % topical foam, , Disp: , Rfl:     Fluocinolone Acetonide Scalp 0 01 % OIL, , Disp: , Rfl:     ALLERGIES:  No Known Allergies    REVIEW OF SYSTEMS:  Review of Systems   Constitutional: Positive for unexpected weight change  Negative for chills and fever  HENT: Negative for hearing loss, nosebleeds and sore throat  Eyes: Positive for visual disturbance (blurry spots - eye doctor appt pending)  Negative for pain and redness  Respiratory: Negative for cough, shortness of breath and wheezing  Cardiovascular: Negative for chest pain, palpitations and leg swelling  Gastrointestinal: Negative for abdominal pain, nausea and vomiting  Endocrine: Negative for polydipsia and polyuria  Genitourinary: Negative for dysuria and hematuria  Musculoskeletal: Positive for arthralgias and myalgias  Negative for joint swelling  Skin: Negative for rash and wound  Neurological: Positive for numbness  Negative for dizziness and headaches  Psychiatric/Behavioral: Negative for decreased concentration and suicidal ideas  The patient is not nervous/anxious          VITALS:  Vitals:       LABS:  HgA1c: No results found for: HGBA1C  BMP:   Lab Results   Component Value Date    GLUCOSE 89 01/31/2017    CALCIUM 8 9 03/10/2018     01/31/2017    K 4 2 01/31/2017    CO2 29 01/31/2017     01/31/2017    BUN 12 01/31/2017    CREATININE 0 76 01/31/2017       _____________________________________________________  PHYSICAL EXAMINATION:  General: well developed and well nourished, alert, oriented times 3 and appears comfortable  Psychiatric: Normal  HEENT: Trachea Midline, No torticollis  Pulmonary: No audible wheezing or respiratory distress   Skin: No masses, erthema, lacerations, fluctation, ulcerations  Neurovascular: Sensation Intact to the Median, Ulnar, Radial Nerve, Motor Intact to the Median, Ulnar, Radial Nerve and Pulses Intact    MUSCULOSKELETAL EXAMINATION:  Left Carpal Tunnel Exam:  Negative thenar atrophy  Positive phalen's test  Positive carpal tunnel compression causing numbness into the index and middle finger  Negative tinels over median nerve at the wrist   Opposition strength 5/5  Abduction strength 5/5  [facility] Exam Bilateral:  No adduction contracture  No hyperextension deformity of MCP joint  Positive localized tenderness over radial and dorsal aspect of thumb (CMC joint)  Grind test is Positive for pain and Positive for crepitus  No triggering or tenderness over the A1 pulley  No pain with Finkelsteins maneuver     ___________________________________________________  STUDIES REVIEWED:  EMG from 7-23-18 showed moderate left carpal tunnel  Normal exam for the right side  PROCEDURES PERFORMED:  Left CMC cortisone injection  Date/Time: 8/20/2018 3:58 PM  Consent given by: patient  Timeout: Immediately prior to procedure a time out was called to verify the correct patient, procedure, equipment, support staff and site/side marked as required   Supporting Documentation  Indications: pain and joint swelling   Procedure Details  Location: thumb - L thumb CMC  Needle size: 25 G  Medications administered: 0 05 mEq sodium bicarbonate 8 4 %; 2 mL lidocaine 1 %; 0 5 mL lidocaine 1 %; 20 mg triamcinolone acetonide 40 mg/mL    Patient tolerance: patient tolerated the procedure well with no immediate complications  Dressing:  Sterile dressing applied      _____________________________________________________  ASSESSMENT/PLAN:    Left carpal tunnel  * Wrist brace provided today to be worn at night  * Patient handout provided about carpal tunnel  * We will re-evaluate at follow up for relief of symptoms with brace    If not significant relief, we may consider a cortisone injection  Bilateral CMC arthritis  * Left worse than right  Cortisone injection given today in the left [facility] joint  * Topical heat application 2 times a day   * Patient handout provided about [facility] arthritis     Follow Up:  Return in about 2 weeks (around 9/3/2018)  To Do Next Visit:  Re-evaluation of current issue    General Discussions:  Carpal Tunnel Syndrome: The anatomy and physiology of carpal tunnel syndrome was discussed with the patient today  Increase pressure localized under the transverse carpal ligament can cause pain, numbness, tingling, or dysesthesias within the median nerve distribution as well as feelings of fatigue, clumsiness, or awkwardness  These symptoms typically occur at night and worse in the morning upon waking  Eventually, untreated carpal tunnel syndrome can result in weakness and permanent loss of muscle within the thenar compartment of the hand  Treatment options were discussed with the patient  Conservative treatment includes nocturnal resting splints to keep the nerve in a neutral position, ergonomic changes within the work or home environment, activity modification, and tendon gliding exercises  Vitamin B6 one tablet daily over the counter may helpful to reduce symptoms  Steroid injections within the carpal canal can help a majority of patients, however this is often self-limited in a majority of patients  Surgical intervention to divide the transverse carpal ligament typically results in a long-lasting relief of the patient's complaints, with the recurrence rate of less than 1%                                                                                                                                                                                     Scribe Attestation    I,:   Susan Pierre PA-C am acting as a scribe while in the presence of the attending physician :        I,:   Josephine Webb MD personally performed the services described in this documentation    as scribed in my presence :

## 2018-08-21 RX ORDER — LIDOCAINE HYDROCHLORIDE 10 MG/ML
0.5 INJECTION, SOLUTION INFILTRATION; PERINEURAL
Status: COMPLETED | OUTPATIENT
Start: 2018-08-20 | End: 2018-08-20

## 2018-08-21 RX ORDER — TRIAMCINOLONE ACETONIDE 40 MG/ML
20 INJECTION, SUSPENSION INTRA-ARTICULAR; INTRAMUSCULAR
Status: COMPLETED | OUTPATIENT
Start: 2018-08-20 | End: 2018-08-20

## 2018-08-21 RX ORDER — LIDOCAINE HYDROCHLORIDE 10 MG/ML
2 INJECTION, SOLUTION INFILTRATION; PERINEURAL
Status: COMPLETED | OUTPATIENT
Start: 2018-08-20 | End: 2018-08-20

## 2018-08-23 ENCOUNTER — APPOINTMENT (OUTPATIENT)
Dept: LAB | Facility: MEDICAL CENTER | Age: 58
End: 2018-08-23
Payer: COMMERCIAL

## 2018-08-23 DIAGNOSIS — E55.9 VITAMIN D DEFICIENCY: ICD-10-CM

## 2018-08-23 DIAGNOSIS — E78.5 HYPERLIPIDEMIA, UNSPECIFIED HYPERLIPIDEMIA TYPE: ICD-10-CM

## 2018-08-23 LAB
25(OH)D3 SERPL-MCNC: 15.6 NG/ML (ref 30–100)
ALBUMIN SERPL BCP-MCNC: 4.1 G/DL (ref 3.5–5)
ALP SERPL-CCNC: 62 U/L (ref 46–116)
ALT SERPL W P-5'-P-CCNC: 32 U/L (ref 12–78)
ANION GAP SERPL CALCULATED.3IONS-SCNC: 7 MMOL/L (ref 4–13)
AST SERPL W P-5'-P-CCNC: 17 U/L (ref 5–45)
BILIRUB SERPL-MCNC: 0.57 MG/DL (ref 0.2–1)
BUN SERPL-MCNC: 13 MG/DL (ref 5–25)
CALCIUM SERPL-MCNC: 8.9 MG/DL (ref 8.3–10.1)
CHLORIDE SERPL-SCNC: 108 MMOL/L (ref 100–108)
CHOLEST SERPL-MCNC: 220 MG/DL (ref 50–200)
CO2 SERPL-SCNC: 27 MMOL/L (ref 21–32)
CREAT SERPL-MCNC: 0.84 MG/DL (ref 0.6–1.3)
GFR SERPL CREATININE-BSD FRML MDRD: 77 ML/MIN/1.73SQ M
GLUCOSE P FAST SERPL-MCNC: 94 MG/DL (ref 65–99)
HDLC SERPL-MCNC: 54 MG/DL (ref 40–60)
LDLC SERPL CALC-MCNC: 135 MG/DL (ref 0–100)
POTASSIUM SERPL-SCNC: 4.3 MMOL/L (ref 3.5–5.3)
PROT SERPL-MCNC: 7.5 G/DL (ref 6.4–8.2)
SODIUM SERPL-SCNC: 142 MMOL/L (ref 136–145)
TRIGL SERPL-MCNC: 155 MG/DL

## 2018-08-23 PROCEDURE — 36415 COLL VENOUS BLD VENIPUNCTURE: CPT

## 2018-08-23 PROCEDURE — 82306 VITAMIN D 25 HYDROXY: CPT

## 2018-08-23 PROCEDURE — 80053 COMPREHEN METABOLIC PANEL: CPT

## 2018-08-23 PROCEDURE — 80061 LIPID PANEL: CPT

## 2018-09-05 ENCOUNTER — APPOINTMENT (OUTPATIENT)
Dept: LAB | Facility: MEDICAL CENTER | Age: 58
End: 2018-09-05
Payer: COMMERCIAL

## 2018-09-05 ENCOUNTER — OFFICE VISIT (OUTPATIENT)
Dept: FAMILY MEDICINE CLINIC | Facility: MEDICAL CENTER | Age: 58
End: 2018-09-05
Payer: COMMERCIAL

## 2018-09-05 VITALS
HEART RATE: 68 BPM | RESPIRATION RATE: 16 BRPM | DIASTOLIC BLOOD PRESSURE: 70 MMHG | WEIGHT: 163.2 LBS | BODY MASS INDEX: 28.01 KG/M2 | SYSTOLIC BLOOD PRESSURE: 110 MMHG

## 2018-09-05 DIAGNOSIS — L68.0 HIRSUTISM: ICD-10-CM

## 2018-09-05 DIAGNOSIS — R25.2 LEG CRAMPING: ICD-10-CM

## 2018-09-05 DIAGNOSIS — L65.9 HAIR LOSS: ICD-10-CM

## 2018-09-05 DIAGNOSIS — E55.9 VITAMIN D DEFICIENCY: ICD-10-CM

## 2018-09-05 DIAGNOSIS — E78.5 HYPERLIPIDEMIA, UNSPECIFIED HYPERLIPIDEMIA TYPE: ICD-10-CM

## 2018-09-05 DIAGNOSIS — F32.A MILD DEPRESSION: Primary | ICD-10-CM

## 2018-09-05 LAB
BASOPHILS # BLD AUTO: 0.01 THOUSANDS/ΜL (ref 0–0.1)
BASOPHILS NFR BLD AUTO: 0 % (ref 0–1)
EOSINOPHIL # BLD AUTO: 0.08 THOUSAND/ΜL (ref 0–0.61)
EOSINOPHIL NFR BLD AUTO: 1 % (ref 0–6)
ERYTHROCYTE [DISTWIDTH] IN BLOOD BY AUTOMATED COUNT: 13.1 % (ref 11.6–15.1)
FERRITIN SERPL-MCNC: 134 NG/ML (ref 8–388)
FSH SERPL-ACNC: 76.4 MIU/ML
HCT VFR BLD AUTO: 41.7 % (ref 34.8–46.1)
HGB BLD-MCNC: 13.1 G/DL (ref 11.5–15.4)
IMM GRANULOCYTES # BLD AUTO: 0.03 THOUSAND/UL (ref 0–0.2)
IMM GRANULOCYTES NFR BLD AUTO: 1 % (ref 0–2)
IRON SATN MFR SERPL: 32 %
IRON SERPL-MCNC: 122 UG/DL (ref 50–170)
LH SERPL-ACNC: 25.7 MIU/ML
LYMPHOCYTES # BLD AUTO: 1.65 THOUSANDS/ΜL (ref 0.6–4.47)
LYMPHOCYTES NFR BLD AUTO: 28 % (ref 14–44)
MAGNESIUM SERPL-MCNC: 2.4 MG/DL (ref 1.6–2.6)
MCH RBC QN AUTO: 29.8 PG (ref 26.8–34.3)
MCHC RBC AUTO-ENTMCNC: 31.4 G/DL (ref 31.4–37.4)
MCV RBC AUTO: 95 FL (ref 82–98)
MONOCYTES # BLD AUTO: 0.33 THOUSAND/ΜL (ref 0.17–1.22)
MONOCYTES NFR BLD AUTO: 6 % (ref 4–12)
NEUTROPHILS # BLD AUTO: 3.82 THOUSANDS/ΜL (ref 1.85–7.62)
NEUTS SEG NFR BLD AUTO: 64 % (ref 43–75)
NRBC BLD AUTO-RTO: 0 /100 WBCS
PLATELET # BLD AUTO: 238 THOUSANDS/UL (ref 149–390)
PMV BLD AUTO: 10.1 FL (ref 8.9–12.7)
RBC # BLD AUTO: 4.39 MILLION/UL (ref 3.81–5.12)
TESTOST SERPL-MCNC: <8 NG/DL
TIBC SERPL-MCNC: 383 UG/DL (ref 250–450)
TSH SERPL DL<=0.05 MIU/L-ACNC: 1.84 UIU/ML (ref 0.36–3.74)
WBC # BLD AUTO: 5.92 THOUSAND/UL (ref 4.31–10.16)

## 2018-09-05 PROCEDURE — 85025 COMPLETE CBC W/AUTO DIFF WBC: CPT

## 2018-09-05 PROCEDURE — 83540 ASSAY OF IRON: CPT

## 2018-09-05 PROCEDURE — 83001 ASSAY OF GONADOTROPIN (FSH): CPT

## 2018-09-05 PROCEDURE — 84443 ASSAY THYROID STIM HORMONE: CPT

## 2018-09-05 PROCEDURE — 83550 IRON BINDING TEST: CPT

## 2018-09-05 PROCEDURE — 84403 ASSAY OF TOTAL TESTOSTERONE: CPT

## 2018-09-05 PROCEDURE — 83002 ASSAY OF GONADOTROPIN (LH): CPT

## 2018-09-05 PROCEDURE — 82728 ASSAY OF FERRITIN: CPT

## 2018-09-05 PROCEDURE — 36415 COLL VENOUS BLD VENIPUNCTURE: CPT

## 2018-09-05 PROCEDURE — 99213 OFFICE O/P EST LOW 20 MIN: CPT | Performed by: FAMILY MEDICINE

## 2018-09-05 PROCEDURE — 83735 ASSAY OF MAGNESIUM: CPT

## 2018-09-05 PROCEDURE — 82672 ASSAY OF ESTROGEN: CPT

## 2018-09-05 RX ORDER — PAROXETINE 10 MG/1
10 TABLET, FILM COATED ORAL DAILY
Qty: 90 TABLET | Refills: 0 | Status: SHIPPED | OUTPATIENT
Start: 2018-09-05 | End: 2018-12-01 | Stop reason: SDUPTHER

## 2018-09-05 NOTE — PROGRESS NOTES
Assessment/Plan:    No problem-specific Assessment & Plan notes found for this encounter  Diagnoses and all orders for this visit:    Mild depression ([address] )  -     PARoxetine (PAXIL) 10 mg tablet; Take 1 tablet (10 mg total) by mouth daily  Most of patient's symptoms can be explained by her mild depression  She definitely has mild depression based on her symptoms and PHQ-9 score  I did recommend we start an SSRI to help with her symptoms  Potential side effects of SSRIs discussed and patient is agreeable to taking the medication  Will have her start low-dose Paxil  Hair loss  -     CBC and differential; Future  -     TSH, 3rd generation with Free T4 reflex; Future  Hair loss may be related to her depression as it can cause stress which can lead to hair loss  Will check some additional labs to evaluate for other causes of hair loss  Hirsutism  -     FSH and LH; Future  -     Testosterone; Future  -     Estrogens, total; Future  Although her breasts were not examined today patient does display symptoms of hirsutism  Will check some additional labs to evaluate this as well  I doubt is related to her depression  Leg cramping  -     CBC and differential; Future  -     Magnesium; Future  -     Iron Panel; Future  Unlikely to be depression related  Will check some additional labs for evaluation  Vitamin D deficiency  Patient has known vitamin-D deficiency  Her vitamin-D is still low on recent labs  She is prescribed vitamin-D by her rheumatologist   I recommended she contact them for further instruction  Hyperlipidemia, unspecified hyperlipidemia type  Elevated on recent labs but overall risk of cardiovascular event is low  No additional medication at this time  Patient CMP was otherwise unremarkable  Follow-up in one month or sooner if needed  Subjective:      Patient ID: Jordon Arceo is a 62 y o  female  Patient presents for follow-up    She is here to discuss symptoms of depression  Symptoms have been going on for at least two months if not more  Patient has been very [place]  She gets very nervous  She has been having hair loss isolated to her scalp but also has hair growth around her left nipple which she is unsure if it is related to depression  She has been shaving the hair around the nipple  She has gained about 5-6 lb in the past year  She has no problem sitting alone and not talking to anybody at social gatherings  She is starting to get feet cramping and calf cramping at rest   This does not occur with ambulation  She has been getting headaches  Continues to have the problems with vision but has yet to schedule an appointment with the eye doctor  She continues to have right breast pain which she believes is secondary to 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 breast cancer  Patient just wants to feel better  Upon further questioning she does have trouble with sleep  Has little energy  Has been having problems appetite  Has been feeling guilty  No trouble concentrating  No psychomotor agitation  No suicidal ideation  The following portions of the patient's history were reviewed and updated as appropriate:   She  has a past medical history of Cancer ([facility] ); Carpal tunnel syndrome; Kidney stone; Osteoporosis; and Sleep apnea  She   Patient Active Problem List    Diagnosis Date Noted    Mild depression ([facility] ) 09/05/2018    Hair loss 09/05/2018    Hirsutism 09/05/2018    Leg cramping 09/05/2018    CMC arthritis 08/20/2018    Abnormal vision of both eyes 08/10/2018    Carpal tunnel syndrome on left     Obstructive sleep apnea 05/23/2017    Environmental and seasonal allergies 05/10/2017    Hyperlipidemia 05/10/2017    Osteoporosis 05/10/2017    Vitamin D deficiency 05/10/2017    Prinzmetal's angina ([facility] [address] ) 09/04/2012     She  has a past surgical history that includes Breast lumpectomy (Right);  Lithotripsy (Left); Cholecystectomy; Bladder suspension; pr colonoscopy flx dx w/collj spec when pfrmd (N/A, 7/10/2017); Colonoscopy; and [place] lymph node biopsy  Her family history includes Breast cancer in her sister; Cancer in her family; Colon polyps in her sister; Diabetes in her sister; Hypertension in her father and mother; Skin cancer in her father, mother, and sister; Thyroid disease in her family; Uterine cancer in her sister  She  reports that she has never smoked  She has never used smokeless tobacco  She reports that she drinks alcohol  She reports that she does not use drugs  Current Outpatient Prescriptions   Medication Sig Dispense Refill    Alendronate Sodium (FOSAMAX PO) Take 700 mg by mouth once a week      Cholecalciferol (VITAMIN D PO) Take 50,000 Units by mouth once a week      clobetasol (OLUX) 0 05 % topical foam       Fluocinolone Acetonide Scalp 0 01 % OIL       PARoxetine (PAXIL) 10 mg tablet Take 1 tablet (10 mg total) by mouth daily 90 tablet 0     No current facility-administered medications for this visit  She has No Known Allergies  PHQ-9 Depression Screening    PHQ-9:    Frequency of the following problems over the past two weeks:       Little interest or pleasure in doing things:  1 - several days  Feeling down, depressed, or hopeless:  1 - several days  Trouble falling or staying asleep, or sleeping too much:  2 - more than half the days  Feeling tired or having little energy:  2 - more than half the days  Poor appetite or overeatin - several days  Feeling bad about yourself - or that you are a failure or have let yourself or your family down:  1 - several days  Trouble concentrating on things, such as reading the newspaper or watching television:  0 - not at all  Moving or speaking so slowly that other people could have noticed   Or the opposite - being so fidgety or restless that you have been moving around a lot more than usual:  0 - not at all  Thoughts that you would be better off dead, or of hurting yourself in some way:  0 - not at all  PHQ-2 Score:  2  PHQ-9 Score:  8         Review of Systems   Constitutional: Negative for fever  Respiratory: Negative for shortness of breath  Cardiovascular: Negative for chest pain  Gastrointestinal: Negative for abdominal pain and blood in stool  Genitourinary: Negative for dysuria  Musculoskeletal: Positive for myalgias  Negative for arthralgias  Skin: Negative for rash  Neurological: Positive for headaches  Psychiatric/Behavioral: Positive for dysphoric mood  The patient is nervous/anxious  Objective:      /70 (Cuff Size: Standard)   Pulse 68   Resp 16   Wt 74 kg (163 lb 3 2 oz)   BMI 28 01 kg/m²          Physical Exam   Constitutional: She is oriented to person, place, and time  Vital signs are normal  She appears well-developed and well-nourished  HENT:   Head: Normocephalic and atraumatic  Right Ear: Tympanic membrane, external ear and ear canal normal    Left Ear: Tympanic membrane, external ear and ear canal normal    Nose: Nose normal    Mouth/Throat: Uvula is midline, oropharynx is clear and moist and mucous membranes are normal    Eyes: Conjunctivae, EOM and lids are normal  Pupils are equal, round, and reactive to light  Neck: Trachea normal  Neck supple  No thyromegaly present  Cardiovascular: Normal rate, regular rhythm, S1 normal and S2 normal     No murmur heard  Pulmonary/Chest: Effort normal and breath sounds normal    Breast exam not performed to assess for hair as patient states she shaved the hair off  Abdominal: Soft  Bowel sounds are normal  There is no tenderness  Lymphadenopathy:     She has no cervical adenopathy  Neurological: She is alert and oriented to person, place, and time  No cranial nerve deficit  Skin: Skin is warm, dry and intact  Psychiatric: She has a normal mood and affect   Her speech is normal and behavior is normal  Thought content normal

## 2018-09-08 LAB — ESTROGEN SERPL-MCNC: 47 PG/ML

## 2018-09-10 ENCOUNTER — OFFICE VISIT (OUTPATIENT)
Dept: OBGYN CLINIC | Facility: MEDICAL CENTER | Age: 58
End: 2018-09-10
Payer: COMMERCIAL

## 2018-09-10 VITALS
HEART RATE: 72 BPM | BODY MASS INDEX: 28.88 KG/M2 | SYSTOLIC BLOOD PRESSURE: 118 MMHG | WEIGHT: 163 LBS | HEIGHT: 63 IN | DIASTOLIC BLOOD PRESSURE: 71 MMHG

## 2018-09-10 DIAGNOSIS — M19.049 CMC ARTHRITIS: ICD-10-CM

## 2018-09-10 DIAGNOSIS — G56.02 CARPAL TUNNEL SYNDROME ON LEFT: Primary | ICD-10-CM

## 2018-09-10 PROCEDURE — 99213 OFFICE O/P EST LOW 20 MIN: CPT | Performed by: ORTHOPAEDIC SURGERY

## 2018-09-10 PROCEDURE — 20600 DRAIN/INJ JOINT/BURSA W/O US: CPT | Performed by: ORTHOPAEDIC SURGERY

## 2018-09-10 RX ADMIN — Medication 0.05 MEQ: at 15:33

## 2018-09-10 RX ADMIN — LIDOCAINE HYDROCHLORIDE 2.5 ML: 10 INJECTION, SOLUTION INFILTRATION; PERINEURAL at 15:33

## 2018-09-10 RX ADMIN — TRIAMCINOLONE ACETONIDE 20 MG: 40 INJECTION, SUSPENSION INTRA-ARTICULAR; INTRAMUSCULAR at 15:33

## 2018-09-10 NOTE — PROGRESS NOTES
CHIEF COMPLAINT:  Chief Complaint   Patient presents with    Left Hand - Follow-up       SUBJECTIVE:  Glory Willett is a 62y o  year old RHD female who presents to the office today for follow up for Bilateral [facility] joint arthritis and left carpal tunnel syndrome  At the patients last appointment she received and CSI into her left [facility] joint  The patient states that the injection gave her relief of her symptoms for 3 days  The patient was also given a wrist brace to wear at night to help with her carpal tunnel symptoms  The patient states that the wrist brace helped with the pain associated with carpal tunnel but she is still waking up from sleep at night due to the numbness and tingling  PAST MEDICAL HISTORY:  Past Medical History:   Diagnosis Date    Cancer [facility])     Right breast cancer R lymph node bx neg  Last assessed: 6/4/13    Carpal tunnel syndrome     Kidney stone     Last assessed: 1/7/16    Osteoporosis     Sleep apnea        PAST SURGICAL HISTORY:  Past Surgical History:   Procedure Laterality Date    BLADDER SUSPENSION      BREAST LUMPECTOMY Right     CHOLECYSTECTOMY      COLONOSCOPY      LITHOTRIPSY Left     [place] COLONOSCOPY FLX DX W/COLLJ SPEC WHEN PFRMD N/A 7/10/2017    Procedure: COLONOSCOPY;  Surgeon: Trixie Lord MD;  [facility];   Service: Gastroenterology    SENTINEL LYMPH NODE BIOPSY         FAMILY HISTORY:  Family History   Problem Relation Age of Onset    Skin cancer Mother    Anika [place] Hypertension Mother     Skin cancer Father     Hypertension Father     Diabetes Sister         Mellitus    Breast cancer Sister     Uterine cancer Sister     Skin cancer Sister     Colon polyps Sister         Sigmoid    Cancer Family     Thyroid disease Family         Disorder       SOCIAL HISTORY:  Social History   Substance Use Topics    Smoking status: Never Smoker    Smokeless tobacco: Never Used    Alcohol use Yes      Comment: Rare       MEDICATIONS:    Current Outpatient Prescriptions:     Alendronate Sodium (FOSAMAX PO), Take 700 mg by mouth once a week, Disp: , Rfl:     Cholecalciferol (VITAMIN D PO), Take 50,000 Units by mouth once a week, Disp: , Rfl:     clobetasol (OLUX) 0 05 % topical foam, , Disp: , Rfl:     Fluocinolone Acetonide Scalp 0 01 % OIL, , Disp: , Rfl:     PARoxetine (PAXIL) 10 mg tablet, Take 1 tablet (10 mg total) by mouth daily, Disp: 90 tablet, Rfl: 0    ALLERGIES:  No Known Allergies    REVIEW OF SYSTEMS:  Review of Systems   Constitutional: Negative for chills, fever and unexpected weight change  HENT: Negative for hearing loss, nosebleeds and sore throat  Eyes: Negative for pain, redness and visual disturbance  Respiratory: Negative for cough, shortness of breath and wheezing  Cardiovascular: Negative for chest pain, palpitations and leg swelling  Gastrointestinal: Negative for abdominal pain, nausea and vomiting  Endocrine: Negative for polydipsia and polyuria  Genitourinary: Negative for difficulty urinating and hematuria  Musculoskeletal: Positive for arthralgias  Negative for joint swelling and myalgias  Skin: Negative for rash and wound  Neurological: Negative for dizziness, numbness and headaches  Psychiatric/Behavioral: Negative for decreased concentration, dysphoric mood and suicidal ideas  The patient is not nervous/anxious          VITALS:  Vitals:    09/10/18 1457   BP: 118/71   Pulse: 72       LABS:  HgA1c: No results found for: HGBA1C  BMP:   Lab Results   Component Value Date    CALCIUM 8 9 08/23/2018     08/23/2018    K 4 3 08/23/2018    CO2 27 08/23/2018     08/23/2018    BUN 13 08/23/2018    CREATININE 0 84 08/23/2018       _____________________________________________________  PHYSICAL EXAMINATION:  General: well developed and well nourished, alert, oriented times 3 and appears comfortable  Psychiatric: Normal  HEENT: Trachea Midline, No torticollis  Pulmonary: No audible wheezing or respiratory distress   Skin: No masses, erthema, lacerations, fluctation, ulcerations  Neurovascular: Sensation intact to the Ulnar Nerve, Sensation Intact to the Radial Nerve, Motor Intact to the Median, Ulnar, Radial Nerve and Pulses Intact    MUSCULOSKELETAL EXAMINATION:  left CMC Exam:  No adduction contracture  No hyperextension deformity of MCP joint  Positive localized tenderness over radial and dorsal aspect of thumb (CMC joint)  Grind test is Positive for pain and Positive for crepitus  No triggering or tenderness over the A1 pulley  No pain with Finkelsteins maneuver         ___________________________________________________  STUDIES REVIEWED:  No studies reviewed  PROCEDURES PERFORMED:  Small joint arthrocentesis  Date/Time: 9/10/2018 3:33 PM  Consent given by: patient  Site marked: site marked  Supporting Documentation  Indications: pain   Procedure Details  Location: thumb - L thumb CMC  Needle size: 25 G  Ultrasound guidance: no  Medications administered: 0 05 mEq sodium bicarbonate 8 4 %; 2 5 mL lidocaine 1 %; 20 mg triamcinolone acetonide 40 mg/mL    Patient tolerance: patient tolerated the procedure well with no immediate complications  Dressing:  Sterile dressing applied           _____________________________________________________  ASSESSMENT/PLAN:    Left CMC joint arthritis   * Patient was offered a repeat injection into the left thumb CMC joint today and agreed  CSI performed into the left thumb CMC joint today   * Apply ice as needed for the next 1-2 days   * Follow up in 2 weeks     Left carpal tunnel syndrome  * Continue night time bracing   * Follow up in 2 weeks, at that time if the numbness and tingling is not getting better she may consider carpal tunnel release          Follow Up:  Return in about 2 weeks (around 9/24/2018) for Recheck        To Do Next Visit:  Re-evaluation of current issue      Scribe Attestation    I,:   Valentino Mae am acting as a scribe while in the presence of the attending physician :        I,:   Chao Rock MD personally performed the services described in this documentation    as scribed in my presence :

## 2018-09-11 ENCOUNTER — TELEPHONE (OUTPATIENT)
Dept: FAMILY MEDICINE CLINIC | Facility: MEDICAL CENTER | Age: 58
End: 2018-09-11

## 2018-09-11 RX ORDER — LIDOCAINE HYDROCHLORIDE 10 MG/ML
2.5 INJECTION, SOLUTION INFILTRATION; PERINEURAL
Status: COMPLETED | OUTPATIENT
Start: 2018-09-10 | End: 2018-09-10

## 2018-09-11 RX ORDER — TRIAMCINOLONE ACETONIDE 40 MG/ML
20 INJECTION, SUSPENSION INTRA-ARTICULAR; INTRAMUSCULAR
Status: COMPLETED | OUTPATIENT
Start: 2018-09-10 | End: 2018-09-10

## 2018-09-11 NOTE — TELEPHONE ENCOUNTER
I spoke with patient directly  I did inform her that her estrogen was slightly above the cutoff but I was unsure of the significance  I did inform her that I would send a message to her gynecologist to get more information as to the relevance of this  Patient was grateful for that  In the meantime I did recommend she make a follow-up with her dermatologist for the hair loss on the scalp and excess hair around the nipple

## 2018-09-11 NOTE — TELEPHONE ENCOUNTER
Yes, the estrogen is slightly higher than the cut off  This would not however explain her hair loss on her scalp or hair growth around her nipple  I recommend she follow up with her gynecologist for further discussion of this

## 2018-09-11 NOTE — TELEPHONE ENCOUNTER
Spoke with patient  Has a derm already and sees him yearly  appt in December  She was looking at her results on my chart  Noticed her estrogen is elevated for a post menopausal person  She looked it up and her symptoms are related to too much estrogen  What do you advise?

## 2018-09-11 NOTE — TELEPHONE ENCOUNTER
----- Message from Dhaval Hodgson DO sent at 9/11/2018 12:34 PM EDT -----  Lab results reviewed and labs were essentially unremarkable  Unclear what is causing patient's hair loss in her scalp and hair growth around her nipple  I can have patient see Dermatology for further evaluation  Please let me know if she is agreeable

## 2018-09-13 NOTE — TELEPHONE ENCOUNTER
I did receive a message back from patient's gynecologist in regards to the slightly elevated estrogen level for a postmenopausal female  Patient's gynecologist did inform me that the levels are normal and there can be slight elevation despite patient having gone through menopause  The hair loss on the scalp and the excess hair on the nipple is secondary to the decrease in estrogen thus allowing testosterone to act more in the body  This will therefore cause male pattern hair results such as hair loss on the scalp and hair on the chest or patient's case around the nipple  No treatment at this time  Patient acknowledged understanding

## 2018-09-24 ENCOUNTER — OFFICE VISIT (OUTPATIENT)
Dept: OBGYN CLINIC | Facility: MEDICAL CENTER | Age: 58
End: 2018-09-24
Payer: COMMERCIAL

## 2018-09-24 VITALS
SYSTOLIC BLOOD PRESSURE: 106 MMHG | HEART RATE: 74 BPM | HEIGHT: 63 IN | BODY MASS INDEX: 28.88 KG/M2 | DIASTOLIC BLOOD PRESSURE: 71 MMHG | WEIGHT: 163 LBS

## 2018-09-24 DIAGNOSIS — G56.02 CARPAL TUNNEL SYNDROME ON LEFT: Primary | ICD-10-CM

## 2018-09-24 PROCEDURE — 99213 OFFICE O/P EST LOW 20 MIN: CPT | Performed by: ORTHOPAEDIC SURGERY

## 2018-09-24 NOTE — PROGRESS NOTES
SUBJECTIVE:  Sujatha Albarran is a 62y o  year old female who presents for follow up after surgery for  done on  7/10/2017  Today patient has ***      VITALS:  Vitals:    09/24/18 1522   BP: 106/71   Pulse: 74       PHYSICAL EXAMINATION:  General: {1234:31243::"well developed and well nourished","alert","oriented times 3","appears comfortable"}  Psychiatric: {Psych:97954::"Normal"}    MUSCULOSKELETAL EXAMINATION:  ***  Incision: {post op incision:25888}  Range of Motion:   Neurovascular status: {post op neuro exam:89599::"Neuro intact, good cap refill"}      STUDIES REVIEWED:  {[facility]"I personally reviewed the following images of *** and my interpretation is ***"}      PROCEDURES PERFORMED:  Procedures  {Was Procdoc done:70068::"No Procedures performed today"}      ASSESSMENT/PLAN:    ***  * {anything done today?:97166}  *     FOLLOW UP:  No Follow-up on file        TO DO AT NEXT VISIT:  {To do next visit:22941::"Re-evaluation of current issue"}      Scribe Attestation    I,:    am acting as a scribe while in the presence of the attending physician :        I,:    personally performed the services described in this documentation    as scribed in my presence :

## 2018-09-25 DIAGNOSIS — G56.00 CARPAL TUNNEL SYNDROME, UNSPECIFIED LATERALITY: Primary | ICD-10-CM

## 2018-10-02 ENCOUNTER — ANESTHESIA EVENT (OUTPATIENT)
Dept: PERIOP | Facility: HOSPITAL | Age: 58
End: 2018-10-02
Payer: COMMERCIAL

## 2018-10-03 ENCOUNTER — HOSPITAL ENCOUNTER (OUTPATIENT)
Facility: HOSPITAL | Age: 58
Setting detail: OUTPATIENT SURGERY
Discharge: HOME | End: 2018-10-03
Attending: ORTHOPAEDIC SURGERY | Admitting: ORTHOPAEDIC SURGERY
Payer: COMMERCIAL

## 2018-10-03 ENCOUNTER — ANESTHESIA (OUTPATIENT)
Dept: PERIOP | Facility: HOSPITAL | Age: 58
End: 2018-10-03
Payer: COMMERCIAL

## 2018-10-03 VITALS
BODY MASS INDEX: 27.36 KG/M2 | SYSTOLIC BLOOD PRESSURE: 120 MMHG | HEART RATE: 74 BPM | OXYGEN SATURATION: 97 % | HEIGHT: 64 IN | TEMPERATURE: 97.6 F | DIASTOLIC BLOOD PRESSURE: 73 MMHG | WEIGHT: 160.27 LBS | RESPIRATION RATE: 17 BRPM

## 2018-10-03 DIAGNOSIS — G56.02 CARPAL TUNNEL SYNDROME ON LEFT: Primary | ICD-10-CM

## 2018-10-03 PROCEDURE — 29848 WRIST ENDOSCOPY/SURGERY: CPT | Performed by: ORTHOPAEDIC SURGERY

## 2018-10-03 RX ORDER — HYDROMORPHONE HCL/PF 1 MG/ML
0.5 SYRINGE (ML) INJECTION
Status: DISCONTINUED | OUTPATIENT
Start: 2018-10-03 | End: 2018-10-03 | Stop reason: HOSPADM

## 2018-10-03 RX ORDER — ONDANSETRON 2 MG/ML
4 INJECTION INTRAMUSCULAR; INTRAVENOUS EVERY 6 HOURS PRN
Status: DISCONTINUED | OUTPATIENT
Start: 2018-10-03 | End: 2018-10-03 | Stop reason: HOSPADM

## 2018-10-03 RX ORDER — MAGNESIUM HYDROXIDE 1200 MG/15ML
LIQUID ORAL AS NEEDED
Status: DISCONTINUED | OUTPATIENT
Start: 2018-10-03 | End: 2018-10-03 | Stop reason: HOSPADM

## 2018-10-03 RX ORDER — METOCLOPRAMIDE HYDROCHLORIDE 5 MG/ML
10 INJECTION INTRAMUSCULAR; INTRAVENOUS ONCE AS NEEDED
Status: DISCONTINUED | OUTPATIENT
Start: 2018-10-03 | End: 2018-10-03 | Stop reason: HOSPADM

## 2018-10-03 RX ORDER — FENTANYL CITRATE 50 UG/ML
INJECTION, SOLUTION INTRAMUSCULAR; INTRAVENOUS AS NEEDED
Status: DISCONTINUED | OUTPATIENT
Start: 2018-10-03 | End: 2018-10-03 | Stop reason: SURG

## 2018-10-03 RX ORDER — FENTANYL CITRATE/PF 50 MCG/ML
50 SYRINGE (ML) INJECTION
Status: DISCONTINUED | OUTPATIENT
Start: 2018-10-03 | End: 2018-10-03 | Stop reason: HOSPADM

## 2018-10-03 RX ORDER — SODIUM CHLORIDE, SODIUM LACTATE, POTASSIUM CHLORIDE, CALCIUM CHLORIDE 600; 310; 30; 20 MG/100ML; MG/100ML; MG/100ML; MG/100ML
INJECTION, SOLUTION INTRAVENOUS CONTINUOUS PRN
Status: DISCONTINUED | OUTPATIENT
Start: 2018-10-03 | End: 2018-10-03 | Stop reason: SURG

## 2018-10-03 RX ORDER — PROPOFOL 10 MG/ML
INJECTION, EMULSION INTRAVENOUS CONTINUOUS PRN
Status: DISCONTINUED | OUTPATIENT
Start: 2018-10-03 | End: 2018-10-03 | Stop reason: SURG

## 2018-10-03 RX ORDER — MIDAZOLAM HYDROCHLORIDE 1 MG/ML
INJECTION INTRAMUSCULAR; INTRAVENOUS AS NEEDED
Status: DISCONTINUED | OUTPATIENT
Start: 2018-10-03 | End: 2018-10-03 | Stop reason: SURG

## 2018-10-03 RX ORDER — PROMETHAZINE HYDROCHLORIDE 25 MG/ML
25 INJECTION, SOLUTION INTRAMUSCULAR; INTRAVENOUS ONCE AS NEEDED
Status: DISCONTINUED | OUTPATIENT
Start: 2018-10-03 | End: 2018-10-03 | Stop reason: HOSPADM

## 2018-10-03 RX ORDER — HYDROCODONE BITARTRATE AND ACETAMINOPHEN 5; 325 MG/1; MG/1
1 TABLET ORAL EVERY 6 HOURS PRN
Qty: 20 TABLET | Refills: 0 | Status: SHIPPED | OUTPATIENT
Start: 2018-10-03 | End: 2018-10-15

## 2018-10-03 RX ORDER — LIDOCAINE HYDROCHLORIDE 10 MG/ML
INJECTION, SOLUTION INFILTRATION; PERINEURAL AS NEEDED
Status: DISCONTINUED | OUTPATIENT
Start: 2018-10-03 | End: 2018-10-03 | Stop reason: SURG

## 2018-10-03 RX ORDER — PROPOFOL 10 MG/ML
INJECTION, EMULSION INTRAVENOUS AS NEEDED
Status: DISCONTINUED | OUTPATIENT
Start: 2018-10-03 | End: 2018-10-03 | Stop reason: SURG

## 2018-10-03 RX ORDER — MEPERIDINE HYDROCHLORIDE 50 MG/ML
12.5 INJECTION INTRAMUSCULAR; INTRAVENOUS; SUBCUTANEOUS ONCE AS NEEDED
Status: DISCONTINUED | OUTPATIENT
Start: 2018-10-03 | End: 2018-10-03 | Stop reason: HOSPADM

## 2018-10-03 RX ORDER — SODIUM CHLORIDE, SODIUM LACTATE, POTASSIUM CHLORIDE, CALCIUM CHLORIDE 600; 310; 30; 20 MG/100ML; MG/100ML; MG/100ML; MG/100ML
75 INJECTION, SOLUTION INTRAVENOUS CONTINUOUS
Status: DISCONTINUED | OUTPATIENT
Start: 2018-10-03 | End: 2018-10-03 | Stop reason: HOSPADM

## 2018-10-03 RX ORDER — DIPHENHYDRAMINE HYDROCHLORIDE 50 MG/ML
12.5 INJECTION INTRAMUSCULAR; INTRAVENOUS ONCE AS NEEDED
Status: DISCONTINUED | OUTPATIENT
Start: 2018-10-03 | End: 2018-10-03 | Stop reason: HOSPADM

## 2018-10-03 RX ADMIN — SODIUM CHLORIDE, SODIUM LACTATE, POTASSIUM CHLORIDE, AND CALCIUM CHLORIDE: .6; .31; .03; .02 INJECTION, SOLUTION INTRAVENOUS at 11:08

## 2018-10-03 RX ADMIN — PROPOFOL 50 MG: 10 INJECTION, EMULSION INTRAVENOUS at 11:21

## 2018-10-03 RX ADMIN — PROPOFOL 100 MCG/KG/MIN: 10 INJECTION, EMULSION INTRAVENOUS at 11:21

## 2018-10-03 RX ADMIN — MIDAZOLAM HYDROCHLORIDE 2 MG: 1 INJECTION, SOLUTION INTRAMUSCULAR; INTRAVENOUS at 11:16

## 2018-10-03 RX ADMIN — LIDOCAINE HYDROCHLORIDE 50 MG: 10 INJECTION, SOLUTION INFILTRATION; PERINEURAL at 11:21

## 2018-10-03 RX ADMIN — FENTANYL CITRATE 50 MCG: 50 INJECTION, SOLUTION INTRAMUSCULAR; INTRAVENOUS at 11:21

## 2018-10-03 NOTE — PROGRESS NOTES
As per patient she had breast surgery on her right arm, she states she still has blood pressures and blood work done on that arm    States it is ok to start IV on that arm as well  Dr Dustin Ca aware and is ok with doing IV and blood pressure on arm with previous breast surgery

## 2018-10-03 NOTE — INTERVAL H&P NOTE
H&P updated  The patient was examined  Cardiac:  Regular rate and rhythm  Pulmonary: No respiratory distress  Lung sounds clear to auscultation

## 2018-10-03 NOTE — ANESTHESIA POSTPROCEDURE EVALUATION
Post-Op Assessment Note      CV Status:  Stable    Mental Status:  Alert and awake    Hydration Status:  Euvolemic    PONV Controlled:  Controlled    Airway Patency:  Patent    Post Op Vitals Reviewed: Yes          Staff: CRNA           /63 (10/03/18 1134)    Temp 97 8 °F (36 6 °C) (10/03/18 1134)    Pulse 84 (10/03/18 1134)   Resp 14 (10/03/18 1134)    SpO2 97 % (10/03/18 1134)

## 2018-10-03 NOTE — DISCHARGE INSTRUCTIONS
Post Operative Instructions      Please check your Fosamax dose with the provider who prescribed it for you  There was a warning that the dose was too high  You have had surgery on your arm today, please read and follow the information below:  · Elevate your hand above your elbow during the next 24-48 hours to help with swelling  · Place your hand and arm over your head with motion at your shoulder three times a day  · Do not apply any cream/ointment/oil to your incisions including antibiotics  · Do not soak your hands in standing water (dishwater, tubs, Jacuzzi's, pools, etc ) until given permission (typically 2-3 weeks after injury)    Call the office if you notice any:  · Increased numbness or tingling of your hand or fingers that is not relieved with elevation  · Increasing pain that is not controlled with medication  · Difficulty chewing, breathing, swallowing  · Chest pains or shortness of breath  · Fever over 101 4 degrees  Bandage: Your therapist will remove your bandage at your first therapy appointment  Motion: Move fingers into a fist 5 times a day, DO NOT move any splinted fingers  Weight bearing status: Avoid heavy lifting (>5 pounds) with the extremity that was operated on until follow up appointment  Normal activities of daily living are OK  Ice: Ice for 10 minutes every hour as needed for swelling x 24 hours  Sling: No sling necessary  Pain medication: A limited prescriptions for Norco will be given  Do not take Tylenol at the same time as Norco, as Tamra Ma also has Tylenol in it  After surgery, we would like you to take Ibuprofen 600mg one tablet by mouth every 6 hours with food (at breakfast, lunch and dinner)  AND Tylenol 500 mg one tablet by mouth every 6 hours  (at breakfast, lunch and dinner) for 5-7 days after your surgery  Please take these medication EVERYDAY after surgery for 5-7 days, and not just as needed   You can take these medications at the same time   Taking these medications after surgery will limit your need for prescription pain medication  Follow-up Appointment: 7-10 days with Dr Angelika Lazo  Occupational Therapy: Will be scheduled about 3 days after surgery      Please call the office if you have any questions or concerns regarding your post-operative care

## 2018-10-03 NOTE — H&P (VIEW-ONLY)
CHIEF COMPLAINT:  Chief Complaint   Patient presents with    Left Hand - Follow-up       SUBJECTIVE:  Bianca Christie is a 62y o  year old RHD female who presents 2 weeks after her second injection into the Left 1st CMC joint  She states that she is doing much better with this  She does not notice any pain in her carpal tunnel region  She still gets some numbness and tinligng at night though  She has been wearing a brace at night  She denies any constitutional signs or symptoms  PAST MEDICAL HISTORY:  Past Medical History:   Diagnosis Date    Cancer [facility])     Right breast cancer R lymph node bx neg  Last assessed: 6/4/13    Carpal tunnel syndrome     Kidney stone     Last assessed: 1/7/16    Osteoporosis     Sleep apnea        PAST SURGICAL HISTORY:  Past Surgical History:   Procedure Laterality Date    BLADDER SUSPENSION      BREAST LUMPECTOMY Right     CHOLECYSTECTOMY      COLONOSCOPY      LITHOTRIPSY Left     DE COLONOSCOPY FLX DX W/COLLJ SPEC WHEN PFRMD N/A 7/10/2017    Procedure: COLONOSCOPY;  Surgeon: Sally Rao MD;  [facility];   Service: Gastroenterology    SENTINEL LYMPH NODE BIOPSY         FAMILY HISTORY:  Family History   Problem Relation Age of Onset    Skin cancer Mother    [place] Hypertension Mother     Skin cancer Father     Hypertension Father     Diabetes Sister         Mellitus    Breast cancer Sister     Uterine cancer Sister     Skin cancer Sister     Colon polyps Sister         Sigmoid    Cancer Family     Thyroid disease Family         Disorder       SOCIAL HISTORY:  Social History   Substance Use Topics    Smoking status: Never Smoker    Smokeless tobacco: Never Used    Alcohol use Yes      Comment: Rare       MEDICATIONS:    Current Outpatient Prescriptions:     Alendronate Sodium (FOSAMAX PO), Take 700 mg by mouth once a week, Disp: , Rfl:     Cholecalciferol (VITAMIN D PO), Take 50,000 Units by mouth once a week, Disp: , Rfl:     clobetasol (OLUX) 0 05 % topical foam, , Disp: , Rfl:     Fluocinolone Acetonide Scalp 0 01 % OIL, , Disp: , Rfl:     PARoxetine (PAXIL) 10 mg tablet, Take 1 tablet (10 mg total) by mouth daily, Disp: 90 tablet, Rfl: 0    ALLERGIES:  No Known Allergies    REVIEW OF SYSTEMS:  Review of Systems   ROS:   General: no fever, no chills  HEENT:  No loss of hearing or eyesight problems  Eyes:  No red eyes  Respiratory:  No coughing, shortness of breath or wheezing  Cardiovascular:  No chest pain, no palpitations  GI:  Abdomen soft nontender, denies nausea  Endocrine:  No muscle weakness, no frequent urination, no excessive thirst  Urinary:  No dysuria, no incontinence  Musculoskeletal: see HPI and PE  SKIN:  No skin rash, no dry skin  Neurological:  No headaches, no confusion  Psychiatric:  No suicide thoughts, no anxiety, no depression  Review of all other systems is negative    VITALS:  Vitals:    09/24/18 1522   BP: 106/71   Pulse: 74       LABS:  HgA1c: No results found for: HGBA1C  BMP:   Lab Results   Component Value Date    CALCIUM 8 9 08/23/2018     08/23/2018    K 4 3 08/23/2018    CO2 27 08/23/2018     08/23/2018    BUN 13 08/23/2018    CREATININE 0 84 08/23/2018       _____________________________________________________  PHYSICAL EXAMINATION:  General: well developed and well nourished, alert, oriented times 3 and appears comfortable  Psychiatric: Normal  HEENT: Trachea Midline, No torticollis  Cardiac:  Regular rate and rhythm with no murmurs  Pulmonary:   Clear to auscultation with no wheezing or rhonchi  Skin: No masses, erthema, lacerations, fluctation, ulcerations  Neurovascular:   Decreased sensation with median nerve testing    MUSCULOSKELETAL EXAMINATION:  No erythema edema or ecchymosis noted  Skin is warm to touch  Range of motion and strength within normal limits  Positive Tinel sign for median nerve  Positive median nerve compression test    ___________________________________________________  STUDIES REVIEWED:  No studies reviewed  PROCEDURES PERFORMED:  Procedures       _____________________________________________________  ASSESSMENT/PLAN:        Carpal tunnel syndrome on left  -     Case request operating room: RELEASE CARPAL TUNNEL ENDOSCOPIC Left; Standing  -     Case request operating room: RELEASE CARPAL TUNNEL ENDOSCOPIC Left        Other orders  -     Void on call to OR; Standing  -     Insert peripheral IV; Standing  -     Diet NPO; Sips with meds; Standing    Patient will be scheduled for a left arthroscopic carpal tunnel release  Risks and benefits to surgery were discussed with the patient in detail  Patient understands risks and benefits and wishes to proceed  All questions were answered to her satisfaction  We will get this scheduled for her at her convenience  She will follow-up after surgery       Follow Up:  Return for After surgery  To Do Next Visit:       General Discussions:  Carpal Tunnel Syndrome: The anatomy and physiology of carpal tunnel syndrome was discussed with the patient today  Increase pressure localized under the transverse carpal ligament can cause pain, numbness, tingling, or dysesthesias within the median nerve distribution as well as feelings of fatigue, clumsiness, or awkwardness  These symptoms typically occur at night and worse in the morning upon waking  Eventually, untreated carpal tunnel syndrome can result in weakness and permanent loss of muscle within the thenar compartment of the hand  Treatment options were discussed with the patient  Conservative treatment includes nocturnal resting splints to keep the nerve in a neutral position, ergonomic changes within the work or home environment, activity modification, and tendon gliding exercises  Vitamin B6 one tablet daily over the counter may helpful to reduce symptoms     Steroid injections within the carpal canal can help a majority of patients, however this is often self-limited in a majority of patients  Surgical intervention to divide the transverse carpal ligament typically results in a long-lasting relief of the patient's complaints, with the recurrence rate of less than 1%  Operative Discussions:  Standard Consent: The risks and benefits of the procedure were explained to the patient, which include, but are not limited to: Bleeding, infection, recurrence, pain, scar, damage to tendons, damage to nerves, and damage to blood vessels, failure to give desired results and complications related to anesthesia  These risks, along with alternative conservative treatment options, and postoperative protocols were voiced back and understood by the patient  All questions were answered to the patient's satisfaction  The patient agrees to comply with a standard postoperative protocol, and is willing to proceed  Education was provided via written and auditory forms  There were no barriers to learning  Written handouts regarding wound care, incision and scar care, and general preoperative information was provided to the patient  Prior to surgery, the patient may be requested to stop all anti-inflammatory medications  Prophylactic aspirin, Plavix, and Coumadin may be allowed to be continued  Medications including vitamin E , ginkgo, and fish oil are requested to be stopped approximately one week prior to surgery  Hypertensive medications and beta blockers, if taken, should be continued      Scribe Attestation    I,:   Lise Aguilar PA-C am acting as a scribe while in the presence of the attending physician :        I,:   Leigh Moeller MD personally performed the services described in this documentation    as scribed in my presence :

## 2018-10-03 NOTE — OP NOTE
PATIENT NAME: Mayra Montgomery    MEDICAL RECORD NO:  [id number]    PROCEDURE DATE:  10/03/18    :  1960    SURGEON:  LEANN Salcido , Ph D     Rae Fuel: Deedee Martínez DIAGNOSIS:  left carpal tunnel syndrome      POSTOPERATIVE DIAGNOSIS: left carpal tunnel syndrome     PROCEDURE PERFORMED: left endoscopic carpal tunnel release     ANESTHESIA:  conscious sedation and local    COMPLICATIONS:   none    TOURNIQUET TIME: 3 minutes at 250 mmHg on the left    DISPOSITION: Patient was sent to the PACU in stable condition  INDICATION:  The patient is an 62 y o  female with clinical and/or electrodiagnostic evidence of left carpal tunnel syndrome  The patient had failed non-operative management and opted for carpal tunnel release  After informing the patient of the risks and benefits of endoscopic carpal tunnel release, consent was obtained for surgical intervention  PROCEDURE: The patient was identified in the preoperative screening area  The consent form was signed and verified after identifying the correct operative site  The patient was taken to the operating room and underwent conscious sedation and local   The left upper extremity was then prepped and draped in normal sterile fashion with Chlorhexidine solution  The left arm was then elevated, exsanguinated with an Esmarch and the tourniquet placed about the brachium was insufflated to 250 mmHg  The Esmarch was removed  A transverse incision, approximately 1 cm in length, was made just proximal to the distal wrist crease and just ulnar to the median nerve  The subcutaneous tissue was dissected bluntly down to the level of the forearm fascia  A transverse split in the fascia was created by gently piercing the fascia with the tips of tenotomy scissors  The proximal portion of the fascia was released longitudinally into the forearm using tenotomy scissors  The distal fascia was left intact for insertion of dilators    The carpal canal was then dilated using sequentially increasing sized dilators  Once the canal was adequately dilated, the scope and canula tray were then introduced into the carpal canal  The transverse carpal ligament was covered with a thick layer of synovial tissue on its undersurface  The synovial layer was debrided to expose the transverse fibers and the distal edge of the ligament  Once clear visualization of the transverse carpal ligament was obtained, the knife blade was introduced into the canula tray and the ligament was then released from distal to proximal maintaining complete visualization throughout the procedure  Several passes of the knife blade were necessary in some areas to complete the release  Complete release was verified with the endoscopic camera in place clearly visualizing the  cut edges of the transverse carpal ligament with the overlying volar fatty tissue and palmaris brevis muscle fibers protruding through  The transverse carpal ligament was moderately thickended  The scope and cannula were then removed and the wound was irrigated with copious amounts of saline solution  The tourniquet was released at 3 minutes with good capillary refill and color in the digits  The small incision was then repaired using #4-0 nylon sutures placed in an interrupted horizontal mattress fashion  The patient tolerated the procedure well  The incision(s) was/were dressed in a sterile soft bandage  There were no complications during the case  The patient was sent to the PACU in stable condition            Bernardo Rodriguez MD  10/03/18  11:38 AM

## 2018-10-03 NOTE — ANESTHESIA PREPROCEDURE EVALUATION
Review of Systems/Medical History  Patient summary reviewed  Chart reviewed  No history of anesthetic complications     Cardiovascular  Exercise tolerance (METS): >4,  No Hyperlipidemia, No CAD , No angina , No orthopnea,    Pulmonary  Sleep apnea CPAP,        GI/Hepatic  Negative GI/hepatic ROS          Kidney stones,        Endo/Other  Negative endo/other ROS      GYN  Negative gynecology ROS          Hematology  Negative hematology ROS      Musculoskeletal    Comment: Left CTS      Neurology  Negative neurology ROS      Psychology   Depression ,              Physical Exam    Airway    Mallampati score: I  TM Distance: >3 FB  Neck ROM: full     Dental   Comment: Missing and several caps,     Cardiovascular  Cardiovascular exam normal    Pulmonary  Pulmonary exam normal     Other Findings      Lab Results   Component Value Date    WBC 5 92 09/05/2018    HGB 13 1 09/05/2018     09/05/2018     Lab Results   Component Value Date     08/23/2018    K 4 3 08/23/2018    BUN 13 08/23/2018    CREATININE 0 84 08/23/2018     No results found for: PTT   No results found for: INR    No results found for: HGBA1C    No current facility-administered medications on file prior to encounter  Current Outpatient Prescriptions on File Prior to Encounter   Medication Sig    PARoxetine (PAXIL) 10 mg tablet Take 1 tablet (10 mg total) by mouth daily    Alendronate Sodium (FOSAMAX PO) Take 700 mg by mouth once a week    Cholecalciferol (VITAMIN D PO) Take 50,000 Units by mouth once a week    clobetasol (OLUX) 0 05 % topical foam     Fluocinolone Acetonide Scalp 0 01 % OIL          Anesthesia Plan  ASA Score- 2     Anesthesia Type- IV sedation with anesthesia with ASA Monitors  Additional Monitors:   Airway Plan:     Comment: I, Dr Albaro Hodgson, the attending physician, has personally seen and evaluated the patient prior to anesthetic care    I have reviewed the pre-anesthetic record, and other medical records if appropriate to the anesthetic care  Risks and benefits discussed with patient; patient consented and agrees to proceed  If a CRNA is involved in the case, I have reviewed the CRNA assessment, if present, and agree  The patient is in a suitable condition to proceed with my formulated anesthetic plan        Plan Factors-  Patient did not smoke on day of surgery  Induction- intravenous  Postoperative Plan-     Informed Consent- Anesthetic plan and risks discussed with patient  I personally reviewed this patient with the CRNA  Discussed and agreed on the Anesthesia Plan with the EDITH Sanchez

## 2018-10-08 ENCOUNTER — EVALUATION (OUTPATIENT)
Dept: OCCUPATIONAL THERAPY | Facility: MEDICAL CENTER | Age: 58
End: 2018-10-08
Payer: COMMERCIAL

## 2018-10-08 DIAGNOSIS — G56.02 CARPAL TUNNEL SYNDROME ON LEFT: Primary | ICD-10-CM

## 2018-10-08 PROCEDURE — G8992 OTHER PT/OT  D/C STATUS: HCPCS | Performed by: OCCUPATIONAL THERAPIST

## 2018-10-08 PROCEDURE — G8990 OTHER PT/OT CURRENT STATUS: HCPCS | Performed by: OCCUPATIONAL THERAPIST

## 2018-10-08 PROCEDURE — 97165 OT EVAL LOW COMPLEX 30 MIN: CPT | Performed by: OCCUPATIONAL THERAPIST

## 2018-10-08 PROCEDURE — G8991 OTHER PT/OT GOAL STATUS: HCPCS | Performed by: OCCUPATIONAL THERAPIST

## 2018-10-08 NOTE — PROGRESS NOTES
OT Evaluation     Today's date: 10/8/2018  Patient name: Divya Cruz  : 1960  MRN: [id number]  Referring provider: Sg Alcocer  Dx:   Encounter Diagnosis     ICD-10-CM    1  Carpal tunnel syndrome on left G56 02        Start Time: 1435  Stop Time: 1500  Total time in clinic (min): 25 minutes    Assessment    Assessment details: 63 yo RHD female with a CTR on 10/3/18  Bandage removed  Stitches intact with no bleeding  Slight swelling in hypo thenar and thenar eminance muscles  K tape applied for edema mgt  Educated patient on wound care and advised to keep a band aide on surgical site in order to keep stitches intact  Educated on edema mgt  AROM is good and tingling / numbness has improved since surgery  Skilled OT not indicated at this time  Understanding of Dx/Px/POC: excellent  Plan  Patient would benefit from: OT eval  Planned therapy interventions: home exercise program  Duration in visits: 1  Treatment plan discussed with: patient  Plan details: No OT indicated at this time  Subjective Evaluation    History of Present Illness  Date of surgery: 10/3/2018  Mechanism of injury: surgery  Mechanism of injury: EMG performed on 2018  Two cortisone shots were administered in her Aia 16  She was experiencing increased nocturnal awakening  However, since the surgery, she has been sleeping well with decreased numbness /tingling noted  She returned to work on 10/5/18  She has been sleeping wrist brace because of her dogs      Recurrent probem    Quality of life: good    Pain  Current pain ratin  At best pain ratin  At worst pain ratin  Location: wrist  Quality: dull ache and throbbing  Relieving factors: relaxation and medications  Aggravating factors: keyboarding  Progression: improved    Social Support  Lives in: multiple-level home  Lives with: spouse    Employment status: working  Hand dominance: right  Exercise history: no  Life stress: medium      Diagnostic Tests  EMG: abnormal  Treatments  No previous or current treatments        Objective     Active Range of Motion     Left Wrist   Normal active range of motion    Right Wrist   Normal active range of motion    Additional Active Range of Motion Details  Finger to thumb opposition intact with thumb slide      Flowsheet Rows      Most Recent Value   PT/OT G-Codes   Current Score  69   Projected Score  84   FOTO information reviewed  Yes   Assessment Type  Evaluation & Discharge same visit   G code set  Other PT/OT Primary   Other PT Primary Current Status ()  CI   Other PT Primary Goal Status ()  CI   Other PT Primary Discharge Status ()  CI          Precautions: universal    Daily Treatment Diary     Manual              Edema mgt HEP            K tape on palm for edema completed                                                         Exercise Diary                                                                                                                                                                                                                                                                                      Modalities

## 2018-10-10 ENCOUNTER — OFFICE VISIT (OUTPATIENT)
Dept: FAMILY MEDICINE CLINIC | Facility: MEDICAL CENTER | Age: 58
End: 2018-10-10
Payer: COMMERCIAL

## 2018-10-10 VITALS
SYSTOLIC BLOOD PRESSURE: 108 MMHG | WEIGHT: 162.5 LBS | HEART RATE: 70 BPM | DIASTOLIC BLOOD PRESSURE: 78 MMHG | RESPIRATION RATE: 14 BRPM | BODY MASS INDEX: 27.89 KG/M2

## 2018-10-10 DIAGNOSIS — Z23 FLU VACCINE NEED: ICD-10-CM

## 2018-10-10 DIAGNOSIS — M81.0 OSTEOPOROSIS, UNSPECIFIED OSTEOPOROSIS TYPE, UNSPECIFIED PATHOLOGICAL FRACTURE PRESENCE: ICD-10-CM

## 2018-10-10 DIAGNOSIS — F32.5 MAJOR DEPRESSIVE DISORDER WITH SINGLE EPISODE, IN FULL REMISSION (HCC): Primary | ICD-10-CM

## 2018-10-10 DIAGNOSIS — E55.9 VITAMIN D DEFICIENCY: ICD-10-CM

## 2018-10-10 PROCEDURE — 99214 OFFICE O/P EST MOD 30 MIN: CPT | Performed by: FAMILY MEDICINE

## 2018-10-10 PROCEDURE — 90471 IMMUNIZATION ADMIN: CPT

## 2018-10-10 PROCEDURE — 90682 RIV4 VACC RECOMBINANT DNA IM: CPT

## 2018-10-10 NOTE — PROGRESS NOTES
Assessment/Plan:    No problem-specific Assessment & Plan notes found for this encounter  Diagnoses and all orders for this visit:    Major depressive disorder with single episode, in full remission ([address] )  Currently in remission as PHQ2 score is 0  Continue current dose of Paxil  Osteoporosis, unspecified osteoporosis type, unspecified pathological fracture presence  Osteoporosis diagnosed on DEXA scan from 2017  Patient tolerating Fosamax well  Continue medication  Vitamin D deficiency  -     Vitamin D 25 hydroxy; Future  Patient has known vitamin-D deficiency  Her recent labs show her vitamin-D was at 15 6   Continue vitamin-D supplementation  Repeat vitamin D in two months  Flu vaccine need  -     influenza vaccine, [id number], quadrivalent, recombinant, PF, 0 5 mL, for patients 18 yr+ (FLUBLOK)  Flu vaccine given and tolerated well  Follow-up in six months or sooner if needed  Subjective:      Patient ID: Mayte Russo is a 62 y o  female  Patient presents for follow-up     Has depression  This was diagnosed at her last office visit  She was started on Paxil 10 mg daily  Patient states she feels much better on the medication  No depression  Her family and friends state there is a difference in patient as well  No suicidal ideation  Patient has osteoporosis  Currently managed by Rheumatology but she no longer wishes to go there  She would like me to manage her osteoporosis  She is currently on Fosamax  She has been on medication for about one year  No side effects from the medication  Tolerating it well otherwise  Does not take a supplemental calcium  Patient has vitamin-D deficiency  This is currently managed by Rheumatology as well but she no longer wishes to go there  She is currently on vitamin D 19325 units which she was taking once a week but her last labs showed a decrease in her vitamin-D again and therefore she is taking it twice weekly    The rheumatologist had her on the vitamin-D twice weekly initially but dropped down to once weekly when her vitamin-D increased however as stated she did have a recent decrease in vitamin-D  She has been on the medication for about one year  Tolerating it well without any side effects  Patient would like a flu vaccine  The following portions of the patient's history were reviewed and updated as appropriate:   She   Patient Active Problem List    Diagnosis Date Noted    Major depressive disorder with single episode, in full remission ([facility] [address] ) 09/05/2018    Hair loss 09/05/2018    Hirsutism 09/05/2018    Leg cramping 09/05/2018    CMC arthritis 08/20/2018    Abnormal vision of both eyes 08/10/2018    Carpal tunnel syndrome on left     Obstructive sleep apnea 05/23/2017    Environmental and seasonal allergies 05/10/2017    Hyperlipidemia 05/10/2017    Osteoporosis 05/10/2017    Vitamin D deficiency 05/10/2017    Prinzmetal's angina (HCC) 09/04/2012     Current Outpatient Prescriptions   Medication Sig Dispense Refill    Alendronate Sodium (FOSAMAX PO) Take 700 mg by mouth once a week      Cholecalciferol (VITAMIN D PO) Take 50,000 Units by mouth once a week      clobetasol (OLUX) 0 05 % topical foam       Fluocinolone Acetonide Scalp 0 01 % OIL       HYDROcodone-acetaminophen (NORCO) 5-325 mg per tablet Take 1 tablet by mouth every 6 (six) hours as needed for pain for up to 20 doses For continuation of care Max Daily Amount: 4 tablets 20 tablet 0    PARoxetine (PAXIL) 10 mg tablet Take 1 tablet (10 mg total) by mouth daily 90 tablet 0     No current facility-administered medications for this visit  She has No Known Allergies       Review of Systems   Constitutional: Negative for fever  Respiratory: Negative for shortness of breath  Cardiovascular: Negative for chest pain           Objective:      /78   Pulse 70   Resp 14   Wt 73 7 kg (162 lb 8 oz)   BMI 27 89 kg/m² Physical Exam   Constitutional: She appears well-developed and well-nourished  Cardiovascular: Normal rate, regular rhythm and normal heart sounds  Pulmonary/Chest: Effort normal and breath sounds normal    Psychiatric: She has a normal mood and affect   Her behavior is normal  Judgment and thought content normal

## 2018-10-15 ENCOUNTER — OFFICE VISIT (OUTPATIENT)
Dept: OBGYN CLINIC | Facility: MEDICAL CENTER | Age: 58
End: 2018-10-15
Payer: COMMERCIAL

## 2018-10-15 VITALS
HEART RATE: 77 BPM | BODY MASS INDEX: 27.66 KG/M2 | SYSTOLIC BLOOD PRESSURE: 111 MMHG | DIASTOLIC BLOOD PRESSURE: 72 MMHG | RESPIRATION RATE: 18 BRPM | HEIGHT: 64 IN | WEIGHT: 162 LBS

## 2018-10-15 DIAGNOSIS — Z48.89 AFTERCARE FOLLOWING SURGERY: ICD-10-CM

## 2018-10-15 DIAGNOSIS — G56.02 CARPAL TUNNEL SYNDROME ON LEFT: Primary | ICD-10-CM

## 2018-10-15 DIAGNOSIS — M77.8 RIGHT WRIST TENDONITIS: ICD-10-CM

## 2018-10-15 PROCEDURE — 20550 NJX 1 TENDON SHEATH/LIGAMENT: CPT | Performed by: ORTHOPAEDIC SURGERY

## 2018-10-15 PROCEDURE — 99024 POSTOP FOLLOW-UP VISIT: CPT | Performed by: ORTHOPAEDIC SURGERY

## 2018-10-15 RX ADMIN — LIDOCAINE HYDROCHLORIDE 2 ML: 10 INJECTION, SOLUTION INFILTRATION; PERINEURAL at 15:32

## 2018-10-15 RX ADMIN — TRIAMCINOLONE ACETONIDE 40 MG: 40 INJECTION, SUSPENSION INTRA-ARTICULAR; INTRAMUSCULAR at 15:32

## 2018-10-15 NOTE — PROGRESS NOTES
SUBJECTIVE:  Bianca Christie is a 62y o  year old female who presents for follow up after surgery for endoscopic left carpal tunnel release done on  10/3/2018  Today patient has no complaints and is very pleased with the results of her surgery  She states all of her symptoms are completley resolved since surgery  She is complaining of right wrist pain today  She states this is constant and worse with activities  VITALS:  Vitals:    10/15/18 1514   BP: 111/72   Pulse: 77   Resp: 18       PHYSICAL EXAMINATION:  General: well developed and well nourished, alert, oriented times 3 and appears comfortable  Psychiatric: Normal    MUSCULOSKELETAL EXAMINATION:  Left hand  Incision: Clean, dry, intact and Sutures intact  Range of Motion: Full ROM all digits    Neurovascular status: Neuro intact, good cap refill     Right wrist  Palpable volar ganglion cyst noted  Full ROM extension, flexion, supination, and pronation       STUDIES REVIEWED:  No studies reviewed         PROCEDURES PERFORMED:  Hand/upper extremity injection  Date/Time: 10/15/2018 3:32 PM  Consent given by: parent  Site marked: site marked  Timeout: Immediately prior to procedure a time out was called to verify the correct patient, procedure, equipment, support staff and site/side marked as required   Supporting Documentation  Indications: pain   Procedure Details  Condition:tendonitis Site: R wrist   Ultrasound guidance: no  Medications administered: 2 mL lidocaine 1 %; 40 mg triamcinolone acetonide 40 mg/mL  Patient tolerance: patient tolerated the procedure well with no immediate complications  Dressing:  Sterile dressing applied              ASSESSMENT/PLAN:    S/P left carpal tunnel release performed on 10/3/18  * Done today: Sutures out and Steri strips applied  * Scar tissue massage discussed    Right wrist FCR tendonitis  * CSI discussed and provided at today's visit  * Follow up as needed    FOLLOW UP:  Return if symptoms worsen or fail to improve        TO DO AT NEXT VISIT:          Scribe Attestation    I,:   Riccardo Melendez MA am acting as a scribe while in the presence of the attending physician :        I,:   Concha Belle MD personally performed the services described in this documentation    as scribed in my presence :

## 2018-10-16 RX ORDER — TRIAMCINOLONE ACETONIDE 40 MG/ML
40 INJECTION, SUSPENSION INTRA-ARTICULAR; INTRAMUSCULAR
Status: COMPLETED | OUTPATIENT
Start: 2018-10-15 | End: 2018-10-15

## 2018-10-16 RX ORDER — LIDOCAINE HYDROCHLORIDE 10 MG/ML
2 INJECTION, SOLUTION INFILTRATION; PERINEURAL
Status: COMPLETED | OUTPATIENT
Start: 2018-10-15 | End: 2018-10-15

## 2018-11-29 ENCOUNTER — ANNUAL EXAM (OUTPATIENT)
Dept: OBGYN CLINIC | Facility: CLINIC | Age: 58
End: 2018-11-29
Payer: COMMERCIAL

## 2018-11-29 VITALS
HEIGHT: 63 IN | DIASTOLIC BLOOD PRESSURE: 70 MMHG | WEIGHT: 164.2 LBS | BODY MASS INDEX: 29.09 KG/M2 | SYSTOLIC BLOOD PRESSURE: 118 MMHG

## 2018-11-29 DIAGNOSIS — Z12.31 ENCOUNTER FOR SCREENING MAMMOGRAM FOR MALIGNANT NEOPLASM OF BREAST: ICD-10-CM

## 2018-11-29 DIAGNOSIS — Z01.419 WELL WOMAN EXAM WITH ROUTINE GYNECOLOGICAL EXAM: Primary | ICD-10-CM

## 2018-11-29 PROCEDURE — S0612 ANNUAL GYNECOLOGICAL EXAMINA: HCPCS | Performed by: OBSTETRICS & GYNECOLOGY

## 2018-11-29 NOTE — PROGRESS NOTES
ASSESSMENT & PLAN: Fransisca Andino is a 62 y o  [id number] with normal gynecologic exam     1   Routine well woman exam done today  2    Pap and HPV:Pap with HPV was not done today  Current ASCCP Guidelines reviewed  Last Pap  2017:  no abnormalities  3  Mammogram ordered  Recommend yearly mammography  Hx of estrogen receptor pos breast ca  4  Colonoscopy recommended  5  The patient is sexually active  Use Rephresh for vaginal dryness  6  The following were reviewed in today's visit: breast self exam, mammography screening ordered, use and side effects of HRT, menopause, osteoporosis, adequate intake of calcium and vitamin D, exercise and healthy diet  Small amount of hair growth on left areola very normal appearing  7  Patient to return to office in 12 months for annual      All questions have been answered to her satisfaction  CC:  Annual Gynecologic Examination    HPI: Fransisca Andino is a 62 y o  [id number] who presents for annual gynecologic examination  She has the following concerns:  Hear on left nipple    Health Maintenance:    She exercises 4 days per week  She wears her seatbelt routinely  She does perform regular monthly self breast exams  She feels safe at home  Patients does follow a healthy diet  Past Medical History:   Diagnosis Date    Abnormal Pap smear of cervix     Cancer ([address] )     Right breast cancer R lymph node bx neg  Last assessed: 6/4/13    Carpal tunnel syndrome     CPAP (continuous positive airway pressure) dependence     Kidney stone     Last assessed: 1/7/16    Osteoporosis     Sleep apnea        Past Surgical History:   Procedure Laterality Date    BLADDER SUSPENSION      BREAST LUMPECTOMY Right     CHOLECYSTECTOMY      COLONOSCOPY      LITHOTRIPSY Left     [place] COLONOSCOPY FLX DX W/COLLJ SPEC WHEN PFRMD N/A 7/10/2017    Procedure: COLONOSCOPY;  Surgeon: Ray Back MD;  [facility];   Service: Gastroenterology    [place] ANGY Bird Mckenna Macario LIG Left 10/3/2018    Procedure: ENDOSCOPIC CARPAL TUNNEL RELEASE;  Surgeon: Dory Juárez MD;  [facility];  Service: Orthopedics    SENTINEL LYMPH NODE BIOPSY         Past OB/Gyn History:   No LMP recorded  Patient is postmenopausal   Menstrual History:  OB History      Para Term  AB Living    2 2 2     2    SAB TAB Ectopic Multiple Live Births            2      Menstrual history: Patient is post menopausal    History of sexually transmitted infection No  Patient is currently sexually active  heterosexual Birth control: postmenopausal  Last Pap  2017 :  no abnormalities  Family History   Problem Relation Age of Onset    Skin cancer Mother     Hypertension Mother     Skin cancer Father     Hypertension Father     Diabetes Sister         Mellitus    Breast cancer Sister     Uterine cancer Sister     Skin cancer Sister     Colon polyps Sister         Sigmoid    Cancer Family     Thyroid disease Family         Disorder       Social History:  Social History     Social History    Marital status: /Civil Union     Spouse name: N/A    Number of children: N/A    Years of education: N/A     Occupational History    Not on file  Social History Main Topics    Smoking status: Never Smoker    Smokeless tobacco: Never Used    Alcohol use Yes      Comment: Rare    Drug use: No    Sexual activity: Not Currently     Other Topics Concern    Not on file     Social History Narrative    No narrative on file     Presently lives with her   Patient is       No Known Allergies    Current Outpatient Prescriptions:     Alendronate Sodium (FOSAMAX PO), Take 700 mg by mouth once a week, Disp: , Rfl:     Cholecalciferol (VITAMIN D PO), Take 50,000 Units by mouth once a week, Disp: , Rfl:     clobetasol (OLUX) 0 05 % topical foam, , Disp: , Rfl:     Fluocinolone Acetonide Scalp 0 01 % OIL, , Disp: , Rfl:     PARoxetine (PAXIL) 10 mg tablet, Take 1 tablet (10 mg total) by mouth daily, Disp: 90 tablet, Rfl: 0    Review of Systems:  Review of Systems   Constitutional: Negative  Respiratory: Negative  Cardiovascular: Negative  Gastrointestinal: Negative  Genitourinary: Negative  Neurological: Negative  Psychiatric/Behavioral: Negative  Physical Exam:  /70 (BP Location: Left arm, Patient Position: Sitting, Cuff Size: Standard)   Ht 5' 3" (1 6 m)   Wt 74 5 kg (164 lb 3 2 oz)   BMI 29 09 kg/m²    Physical Exam   Constitutional: She is oriented to person, place, and time  She appears well-developed and well-nourished  HENT:   Head: Normocephalic and atraumatic  Cardiovascular: Normal rate, regular rhythm and normal heart sounds  Pulmonary/Chest: Effort normal and breath sounds normal  No respiratory distress  She has no wheezes  Right breast exhibits no mass, no nipple discharge, no skin change and no tenderness  Left breast exhibits no mass, no nipple discharge (3-4 fine hairs), no skin change and no tenderness  Abdominal: Soft  She exhibits no distension  There is no tenderness  There is no guarding  Neurological: She is alert and oriented to person, place, and time  Nursing note and vitals reviewed

## 2018-12-01 DIAGNOSIS — F32.A MILD DEPRESSION: ICD-10-CM

## 2018-12-03 RX ORDER — PAROXETINE 10 MG/1
10 TABLET, FILM COATED ORAL DAILY
Qty: 90 TABLET | Refills: 1 | Status: SHIPPED | OUTPATIENT
Start: 2018-12-03 | End: 2019-06-11 | Stop reason: SDUPTHER

## 2018-12-03 NOTE — TELEPHONE ENCOUNTER
From: Emerita Bah  Sent: 12/1/2018 8:50 AM EST  Subject: Medication Renewal Request    [place] A   Bashir would like a refill of the following medications:     PARoxetine (PAXIL) 10 mg tablet JUDITH Franco    Preferred pharmacy: [address] [place] [id number]

## 2018-12-04 ENCOUNTER — APPOINTMENT (OUTPATIENT)
Dept: LAB | Facility: MEDICAL CENTER | Age: 58
End: 2018-12-04
Payer: COMMERCIAL

## 2018-12-04 DIAGNOSIS — E55.9 VITAMIN D DEFICIENCY: ICD-10-CM

## 2018-12-04 LAB — 25(OH)D3 SERPL-MCNC: 36.9 NG/ML (ref 30–100)

## 2018-12-04 PROCEDURE — 82306 VITAMIN D 25 HYDROXY: CPT

## 2018-12-04 PROCEDURE — 36415 COLL VENOUS BLD VENIPUNCTURE: CPT

## 2018-12-20 ENCOUNTER — HOSPITAL ENCOUNTER (OUTPATIENT)
Dept: RADIOLOGY | Age: 58
Discharge: HOME | End: 2018-12-20
Payer: COMMERCIAL

## 2018-12-20 VITALS — BODY MASS INDEX: 29.06 KG/M2 | HEIGHT: 63 IN | WEIGHT: 164 LBS

## 2018-12-20 DIAGNOSIS — Z12.31 ENCOUNTER FOR SCREENING MAMMOGRAM FOR MALIGNANT NEOPLASM OF BREAST: ICD-10-CM

## 2018-12-20 PROCEDURE — 77067 SCR MAMMO BI INCL CAD: CPT

## 2019-03-04 DIAGNOSIS — M81.0 OSTEOPOROSIS WITHOUT CURRENT PATHOLOGICAL FRACTURE, UNSPECIFIED OSTEOPOROSIS TYPE: Primary | ICD-10-CM

## 2019-03-05 RX ORDER — ALENDRONATE SODIUM 70 MG/1
70 TABLET ORAL WEEKLY
Qty: 13 TABLET | Refills: 1 | Status: SHIPPED | OUTPATIENT
Start: 2019-03-05 | End: 2019-08-22 | Stop reason: SDUPTHER

## 2019-04-24 ENCOUNTER — OFFICE VISIT (OUTPATIENT)
Dept: FAMILY MEDICINE CLINIC | Facility: MEDICAL CENTER | Age: 59
End: 2019-04-24
Payer: COMMERCIAL

## 2019-04-24 ENCOUNTER — APPOINTMENT (OUTPATIENT)
Dept: LAB | Facility: MEDICAL CENTER | Age: 59
End: 2019-04-24
Payer: COMMERCIAL

## 2019-04-24 VITALS
WEIGHT: 168.38 LBS | RESPIRATION RATE: 16 BRPM | HEART RATE: 80 BPM | BODY MASS INDEX: 29.83 KG/M2 | SYSTOLIC BLOOD PRESSURE: 122 MMHG | DIASTOLIC BLOOD PRESSURE: 80 MMHG

## 2019-04-24 DIAGNOSIS — R63.5 WEIGHT GAIN: ICD-10-CM

## 2019-04-24 DIAGNOSIS — E55.9 VITAMIN D DEFICIENCY: ICD-10-CM

## 2019-04-24 DIAGNOSIS — F32.5 MAJOR DEPRESSIVE DISORDER WITH SINGLE EPISODE, IN FULL REMISSION (HCC): Primary | ICD-10-CM

## 2019-04-24 DIAGNOSIS — Z23 ENCOUNTER FOR IMMUNIZATION: ICD-10-CM

## 2019-04-24 DIAGNOSIS — M81.0 AGE-RELATED OSTEOPOROSIS WITHOUT CURRENT PATHOLOGICAL FRACTURE: ICD-10-CM

## 2019-04-24 DIAGNOSIS — J30.89 ENVIRONMENTAL AND SEASONAL ALLERGIES: ICD-10-CM

## 2019-04-24 LAB
25(OH)D3 SERPL-MCNC: 33 NG/ML (ref 30–100)
ALBUMIN SERPL BCP-MCNC: 4 G/DL (ref 3.5–5)
ALP SERPL-CCNC: 71 U/L (ref 46–116)
ALT SERPL W P-5'-P-CCNC: 39 U/L (ref 12–78)
ANION GAP SERPL CALCULATED.3IONS-SCNC: 4 MMOL/L (ref 4–13)
AST SERPL W P-5'-P-CCNC: 26 U/L (ref 5–45)
BASOPHILS # BLD AUTO: 0.03 THOUSANDS/ΜL (ref 0–0.1)
BASOPHILS NFR BLD AUTO: 1 % (ref 0–1)
BILIRUB SERPL-MCNC: 0.71 MG/DL (ref 0.2–1)
BUN SERPL-MCNC: 10 MG/DL (ref 5–25)
CALCIUM SERPL-MCNC: 8.5 MG/DL (ref 8.3–10.1)
CHLORIDE SERPL-SCNC: 107 MMOL/L (ref 100–108)
CO2 SERPL-SCNC: 28 MMOL/L (ref 21–32)
CREAT SERPL-MCNC: 0.77 MG/DL (ref 0.6–1.3)
EOSINOPHIL # BLD AUTO: 0.15 THOUSAND/ΜL (ref 0–0.61)
EOSINOPHIL NFR BLD AUTO: 3 % (ref 0–6)
ERYTHROCYTE [DISTWIDTH] IN BLOOD BY AUTOMATED COUNT: 12.9 % (ref 11.6–15.1)
GFR SERPL CREATININE-BSD FRML MDRD: 85 ML/MIN/1.73SQ M
GLUCOSE SERPL-MCNC: 85 MG/DL (ref 65–140)
HCT VFR BLD AUTO: 39.2 % (ref 34.8–46.1)
HGB BLD-MCNC: 12.8 G/DL (ref 11.5–15.4)
IMM GRANULOCYTES # BLD AUTO: 0.01 THOUSAND/UL (ref 0–0.2)
IMM GRANULOCYTES NFR BLD AUTO: 0 % (ref 0–2)
LYMPHOCYTES # BLD AUTO: 1.68 THOUSANDS/ΜL (ref 0.6–4.47)
LYMPHOCYTES NFR BLD AUTO: 31 % (ref 14–44)
MCH RBC QN AUTO: 30.6 PG (ref 26.8–34.3)
MCHC RBC AUTO-ENTMCNC: 32.7 G/DL (ref 31.4–37.4)
MCV RBC AUTO: 94 FL (ref 82–98)
MONOCYTES # BLD AUTO: 0.41 THOUSAND/ΜL (ref 0.17–1.22)
MONOCYTES NFR BLD AUTO: 8 % (ref 4–12)
NEUTROPHILS # BLD AUTO: 3.09 THOUSANDS/ΜL (ref 1.85–7.62)
NEUTS SEG NFR BLD AUTO: 57 % (ref 43–75)
NRBC BLD AUTO-RTO: 0 /100 WBCS
PLATELET # BLD AUTO: 204 THOUSANDS/UL (ref 149–390)
PMV BLD AUTO: 10.3 FL (ref 8.9–12.7)
POTASSIUM SERPL-SCNC: 3.8 MMOL/L (ref 3.5–5.3)
PROT SERPL-MCNC: 7.4 G/DL (ref 6.4–8.2)
RBC # BLD AUTO: 4.18 MILLION/UL (ref 3.81–5.12)
SODIUM SERPL-SCNC: 139 MMOL/L (ref 136–145)
T4 FREE SERPL-MCNC: 1.02 NG/DL (ref 0.76–1.46)
TSH SERPL DL<=0.05 MIU/L-ACNC: 2.16 UIU/ML (ref 0.36–3.74)
WBC # BLD AUTO: 5.37 THOUSAND/UL (ref 4.31–10.16)

## 2019-04-24 PROCEDURE — 99214 OFFICE O/P EST MOD 30 MIN: CPT | Performed by: FAMILY MEDICINE

## 2019-04-24 PROCEDURE — 36415 COLL VENOUS BLD VENIPUNCTURE: CPT

## 2019-04-24 PROCEDURE — 84443 ASSAY THYROID STIM HORMONE: CPT

## 2019-04-24 PROCEDURE — 80053 COMPREHEN METABOLIC PANEL: CPT

## 2019-04-24 PROCEDURE — 82306 VITAMIN D 25 HYDROXY: CPT

## 2019-04-24 PROCEDURE — 84439 ASSAY OF FREE THYROXINE: CPT

## 2019-04-24 PROCEDURE — 90471 IMMUNIZATION ADMIN: CPT

## 2019-04-24 PROCEDURE — 85025 COMPLETE CBC W/AUTO DIFF WBC: CPT

## 2019-04-24 PROCEDURE — 90750 HZV VACC RECOMBINANT IM: CPT

## 2019-04-24 RX ORDER — FLUTICASONE PROPIONATE 50 MCG
1 SPRAY, SUSPENSION (ML) NASAL DAILY
Qty: 3 BOTTLE | Refills: 1 | Status: SHIPPED | OUTPATIENT
Start: 2019-04-24

## 2019-04-24 RX ORDER — MONTELUKAST SODIUM 10 MG/1
10 TABLET ORAL
Qty: 90 TABLET | Refills: 1 | Status: SHIPPED | OUTPATIENT
Start: 2019-04-24 | End: 2020-08-12 | Stop reason: SDUPTHER

## 2019-05-06 ENCOUNTER — OFFICE VISIT (OUTPATIENT)
Dept: OBGYN CLINIC | Facility: MEDICAL CENTER | Age: 59
End: 2019-05-06
Payer: COMMERCIAL

## 2019-05-06 DIAGNOSIS — M18.12 PRIMARY OSTEOARTHRITIS OF FIRST CARPOMETACARPAL JOINT OF LEFT HAND: Primary | ICD-10-CM

## 2019-05-06 PROCEDURE — 99213 OFFICE O/P EST LOW 20 MIN: CPT | Performed by: ORTHOPAEDIC SURGERY

## 2019-05-07 PROCEDURE — 20600 DRAIN/INJ JOINT/BURSA W/O US: CPT | Performed by: ORTHOPAEDIC SURGERY

## 2019-05-07 RX ORDER — TRIAMCINOLONE ACETONIDE 40 MG/ML
20 INJECTION, SUSPENSION INTRA-ARTICULAR; INTRAMUSCULAR
Status: COMPLETED | OUTPATIENT
Start: 2019-05-07 | End: 2019-05-07

## 2019-05-07 RX ORDER — LIDOCAINE HYDROCHLORIDE 10 MG/ML
2.5 INJECTION, SOLUTION INFILTRATION; PERINEURAL
Status: COMPLETED | OUTPATIENT
Start: 2019-05-07 | End: 2019-05-07

## 2019-05-07 RX ADMIN — TRIAMCINOLONE ACETONIDE 20 MG: 40 INJECTION, SUSPENSION INTRA-ARTICULAR; INTRAMUSCULAR at 08:08

## 2019-05-07 RX ADMIN — Medication 0.05 MEQ: at 08:08

## 2019-05-07 RX ADMIN — LIDOCAINE HYDROCHLORIDE 2.5 ML: 10 INJECTION, SOLUTION INFILTRATION; PERINEURAL at 08:08

## 2019-06-11 DIAGNOSIS — F32.A MILD DEPRESSION: ICD-10-CM

## 2019-06-12 ENCOUNTER — TELEPHONE (OUTPATIENT)
Dept: FAMILY MEDICINE CLINIC | Facility: MEDICAL CENTER | Age: 59
End: 2019-06-12

## 2019-06-12 DIAGNOSIS — F32.A MILD DEPRESSION: ICD-10-CM

## 2019-06-12 RX ORDER — PAROXETINE 10 MG/1
TABLET, FILM COATED ORAL
Qty: 90 TABLET | Refills: 1 | Status: SHIPPED | OUTPATIENT
Start: 2019-06-12 | End: 2019-07-29

## 2019-06-12 RX ORDER — PAROXETINE 10 MG/1
10 TABLET, FILM COATED ORAL DAILY
Qty: 90 TABLET | Refills: 0 | Status: SHIPPED | OUTPATIENT
Start: 2019-06-12 | End: 2020-04-22 | Stop reason: SDUPTHER

## 2019-06-12 RX ORDER — PAROXETINE 10 MG/1
10 TABLET, FILM COATED ORAL DAILY
Qty: 90 TABLET | Refills: 0 | Status: SHIPPED | OUTPATIENT
Start: 2019-06-12 | End: 2019-06-12 | Stop reason: SDUPTHER

## 2019-06-20 ENCOUNTER — HOSPITAL ENCOUNTER (OUTPATIENT)
Dept: RADIOLOGY | Age: 59
Discharge: HOME | End: 2019-06-20
Payer: COMMERCIAL

## 2019-06-20 DIAGNOSIS — M81.0 AGE-RELATED OSTEOPOROSIS WITHOUT CURRENT PATHOLOGICAL FRACTURE: ICD-10-CM

## 2019-06-20 PROCEDURE — 77080 DXA BONE DENSITY AXIAL: CPT

## 2019-07-29 ENCOUNTER — OFFICE VISIT (OUTPATIENT)
Dept: OBGYN CLINIC | Facility: MEDICAL CENTER | Age: 59
End: 2019-07-29
Payer: COMMERCIAL

## 2019-07-29 VITALS
BODY MASS INDEX: 30.12 KG/M2 | DIASTOLIC BLOOD PRESSURE: 71 MMHG | WEIGHT: 170 LBS | SYSTOLIC BLOOD PRESSURE: 104 MMHG | RESPIRATION RATE: 18 BRPM | HEIGHT: 63 IN | HEART RATE: 87 BPM

## 2019-07-29 DIAGNOSIS — M18.12 PRIMARY OSTEOARTHRITIS OF FIRST CARPOMETACARPAL JOINT OF LEFT HAND: Primary | ICD-10-CM

## 2019-07-29 PROCEDURE — 99213 OFFICE O/P EST LOW 20 MIN: CPT | Performed by: ORTHOPAEDIC SURGERY

## 2019-07-29 PROCEDURE — 20600 DRAIN/INJ JOINT/BURSA W/O US: CPT | Performed by: ORTHOPAEDIC SURGERY

## 2019-07-29 RX ADMIN — TRIAMCINOLONE ACETONIDE 20 MG: 40 INJECTION, SUSPENSION INTRA-ARTICULAR; INTRAMUSCULAR at 14:25

## 2019-07-29 RX ADMIN — Medication 0.05 MEQ: at 14:25

## 2019-07-29 RX ADMIN — LIDOCAINE HYDROCHLORIDE 2.5 ML: 10 INJECTION, SOLUTION INFILTRATION; PERINEURAL at 14:25

## 2019-07-29 NOTE — PROGRESS NOTES
CHIEF COMPLAINT:  Chief Complaint   Patient presents with    Left Wrist - Pain, Follow-up    Right Wrist - Pain, Follow-up       SUBJECTIVE:  Radha Fuentes is a 62y o  year old female who presents to the office for follow up evaluation for bilateral  thumb CMC OA  Patient was previously seen received [facility] cortisone injections on 08/20/18 and 9/10/18 and 5/6/19 for left thumb CMC OA  Today patient states that she is having minimal pain at the base of her right thumb that causes difficulty holding a coffee cup  Pt states that the last left sided CSI only lasted for a short time  Patient had left carpal tunnel release on 10/03/2018 and received a cortisone into her right wrist on 10/18/18 for FCR tendinitis  PAST MEDICAL HISTORY:  Past Medical History:   Diagnosis Date    Abnormal Pap smear of cervix     Breast cancer ([facility] [address] ) 02/06/2009    right breast    Cancer ([facility] [address] )     Right breast cancer R lymph node bx neg  Last assessed: 6/4/13    Carpal tunnel syndrome     CPAP (continuous positive airway pressure) dependence     History of radiation therapy 03/2009    Kidney stone     Last assessed: 1/7/16    Osteoporosis     Sleep apnea        PAST SURGICAL HISTORY:  Past Surgical History:   Procedure Laterality Date    BLADDER SUSPENSION      BREAST LUMPECTOMY Right 02/06/2009    malignant    CHOLECYSTECTOMY      COLONOSCOPY      LITHOTRIPSY Left     [place] COLONOSCOPY FLX DX W/COLLJ SPEC WHEN PFRMD N/A 7/10/2017    Procedure: COLONOSCOPY;  Surgeon: Shiraz Barton MD;  [facility];   Service: Gastroenterology    DC WRIST Donatoilyn Jefry LIG Left 10/3/2018    Procedure: ENDOSCOPIC CARPAL TUNNEL RELEASE;  Surgeon: Evelyn Barber MD;  [facility];  Service: Orthopedics    SENTINEL LYMPH NODE BIOPSY         FAMILY HISTORY:  Family History   Problem Relation Age of Onset    Skin cancer Mother     Hypertension Mother     Skin cancer Father     Hypertension Father     Prostate cancer Father 79        2 bouts    Diabetes Sister         Mellitus    Breast cancer Sister 40    Uterine cancer Sister 48    Skin cancer Sister     Colon polyps Sister         Sigmoid    Cancer Family     Thyroid disease Family         Disorder    Breast cancer Maternal Aunt 40        mets    Breast cancer Cousin        SOCIAL HISTORY:  Social History     Tobacco Use    Smoking status: Never Smoker    Smokeless tobacco: Never Used   Substance Use Topics    Alcohol use: Yes     Frequency: Monthly or less     Drinks per session: 1 or 2     Comment: Rare    Drug use: No       MEDICATIONS:    Current Outpatient Medications:     alendronate (FOSAMAX) 70 mg tablet, Take 1 tablet (70 mg total) by mouth once a week, Disp: 13 tablet, Rfl: 1    fluticasone (FLONASE) 50 mcg/act nasal spray, 1 spray into each nostril daily, Disp: 3 Bottle, Rfl: 1    montelukast (SINGULAIR) 10 mg tablet, Take 1 tablet (10 mg total) by mouth daily at bedtime, Disp: 90 tablet, Rfl: 1    PARoxetine (PAXIL) 10 mg tablet, Take 1 tablet (10 mg total) by mouth daily, Disp: 90 tablet, Rfl: 0    ALLERGIES:  No Known Allergies    REVIEW OF SYSTEMS:  Review of Systems   Constitutional: Negative for chills, fever and unexpected weight change  HENT: Negative for hearing loss, nosebleeds and sore throat  Eyes: Negative for pain, redness and visual disturbance  Respiratory: Negative for cough, shortness of breath and wheezing  Cardiovascular: Negative for chest pain, palpitations and leg swelling  Gastrointestinal: Negative for abdominal pain, nausea and vomiting  Endocrine: Negative for polydipsia and polyuria  Genitourinary: Negative for dysuria and hematuria  Skin: Negative for rash and wound  Neurological: Negative for dizziness, numbness and headaches  Psychiatric/Behavioral: Negative for decreased concentration, dysphoric mood and suicidal ideas  The patient is not nervous/anxious          VITALS:  Vitals: 07/29/19 1403   BP: 104/71   Pulse: 87   Resp: 18       LABS:  HgA1c: No results found for: HGBA1C  BMP:   Lab Results   Component Value Date    CALCIUM 8 5 04/24/2019    K 3 8 04/24/2019    CO2 28 04/24/2019     04/24/2019    BUN 10 04/24/2019    CREATININE 0 77 04/24/2019       _____________________________________________________  PHYSICAL EXAMINATION:  General: well developed and well nourished, alert, oriented times 3 and appears comfortable  Psychiatric: Normal  HEENT: Trachea Midline, No torticollis  Pulmonary: No audible wheezing or respiratory distress   Skin: No masses, erythema, lacerations, fluctation, ulcerations  Neurovascular: Sensation Intact to the Median, Ulnar, Radial Nerve, Motor Intact to the Median, Ulnar, Radial Nerve and Pulses Intact    MUSCULOSKELETAL EXAMINATION:  Left  CMC Exam:  No adduction contracture  No hyperextension deformity of MCP joint  Positive localized tenderness over radial and dorsal aspect of thumb (CMC joint)  Grind test is Positive for pain and Positive for crepitus  No triggering or tenderness over the A1 pulley  No pain with Finkelsteins maneuver       right CMC Exam:  No adduction contracture  No hyperextension deformity of MCP joint  Positive localized tenderness over radial and dorsal aspect of thumb (CMC joint)  Grind test is Positive for minimal pain and Negative for crepitus  No triggering or tenderness over the A1 pulley  No pain with Finkelsteins maneuver     ________________________________________  STUDIES REVIEWED:  No studies reviewed         PROCEDURES PERFORMED:  Small joint arthrocentesis: L thumb CMC  Date/Time: 7/29/2019 2:25 PM  Consent given by: patient  Site marked: site marked  Timeout: Immediately prior to procedure a time out was called to verify the correct patient, procedure, equipment, support staff and site/side marked as required   Supporting Documentation  Indications: pain   Procedure Details  Location: thumb - L thumb CMC  Needle size: 27 G  Ultrasound guidance: no  Medications administered: 0 05 mEq sodium bicarbonate 8 4 %; 20 mg triamcinolone acetonide 40 mg/mL; 2 5 mL lidocaine 1 %    Patient tolerance: patient tolerated the procedure well with no immediate complications  Dressing:  Sterile dressing applied            _____________________________________________________  ASSESSMENT/PLAN:    Bilateral CMC Osteoarthritis  *CSI was administered today without complications  *Pt was advised that they may have increased pain for the next 24-36 hours from the CSI before feeling relief, during this time pt was advised to take NSAIDs and apply ice  *After the initial 36 hours pt is advised to apply heat and continue motion of the thumb to decrease discomfort  *Pt was advised that activity modification such as resting after increased activity or taking NSAIDs prior to increased activity may be necessary    *Pt was advised that these CSI should not be administered more frequently than 3 months apart and the next step of her treatment would be [facility] suspension-plasty since she is not getting much relief from the CSI      Follow Up:  Return if symptoms worsen or fail to improve        To Do Next Visit:  Re-evaluation of current issue      Scribe Attestation    I,:   Meka Longo am acting as a scribe while in the presence of the attending physician :        I,:   Brijesh Salmeron MD personally performed the services described in this documentation    as scribed in my presence :

## 2019-07-30 RX ORDER — LIDOCAINE HYDROCHLORIDE 10 MG/ML
2.5 INJECTION, SOLUTION INFILTRATION; PERINEURAL
Status: COMPLETED | OUTPATIENT
Start: 2019-07-29 | End: 2019-07-29

## 2019-07-30 RX ORDER — TRIAMCINOLONE ACETONIDE 40 MG/ML
20 INJECTION, SUSPENSION INTRA-ARTICULAR; INTRAMUSCULAR
Status: COMPLETED | OUTPATIENT
Start: 2019-07-29 | End: 2019-07-29

## 2019-08-22 DIAGNOSIS — M81.0 OSTEOPOROSIS WITHOUT CURRENT PATHOLOGICAL FRACTURE, UNSPECIFIED OSTEOPOROSIS TYPE: ICD-10-CM

## 2019-08-23 RX ORDER — ALENDRONATE SODIUM 70 MG/1
TABLET ORAL
Qty: 13 TABLET | Refills: 1 | Status: SHIPPED | OUTPATIENT
Start: 2019-08-23 | End: 2020-02-14

## 2019-09-16 ENCOUNTER — TELEPHONE (OUTPATIENT)
Dept: NEUROLOGY | Facility: CLINIC | Age: 59
End: 2019-09-16

## 2019-09-16 ENCOUNTER — TELEPHONE (OUTPATIENT)
Dept: FAMILY MEDICINE CLINIC | Facility: MEDICAL CENTER | Age: 59
End: 2019-09-16

## 2019-09-16 ENCOUNTER — OFFICE VISIT (OUTPATIENT)
Dept: FAMILY MEDICINE CLINIC | Facility: MEDICAL CENTER | Age: 59
End: 2019-09-16
Payer: COMMERCIAL

## 2019-09-16 VITALS
WEIGHT: 172 LBS | SYSTOLIC BLOOD PRESSURE: 140 MMHG | OXYGEN SATURATION: 98 % | TEMPERATURE: 97.6 F | BODY MASS INDEX: 30.47 KG/M2 | DIASTOLIC BLOOD PRESSURE: 90 MMHG | HEART RATE: 89 BPM

## 2019-09-16 DIAGNOSIS — Z23 ENCOUNTER FOR IMMUNIZATION: ICD-10-CM

## 2019-09-16 DIAGNOSIS — R51.9 NONINTRACTABLE HEADACHE, UNSPECIFIED CHRONICITY PATTERN, UNSPECIFIED HEADACHE TYPE: ICD-10-CM

## 2019-09-16 DIAGNOSIS — M54.2 NECK PAIN: ICD-10-CM

## 2019-09-16 DIAGNOSIS — R51.9 OCCIPITAL PAIN: Primary | ICD-10-CM

## 2019-09-16 PROBLEM — L65.9 HAIR LOSS: Status: RESOLVED | Noted: 2018-09-05 | Resolved: 2019-09-16

## 2019-09-16 PROBLEM — L68.0 HIRSUTISM: Status: RESOLVED | Noted: 2018-09-05 | Resolved: 2019-09-16

## 2019-09-16 PROCEDURE — 90472 IMMUNIZATION ADMIN EACH ADD: CPT

## 2019-09-16 PROCEDURE — 99213 OFFICE O/P EST LOW 20 MIN: CPT | Performed by: FAMILY MEDICINE

## 2019-09-16 PROCEDURE — 90471 IMMUNIZATION ADMIN: CPT

## 2019-09-16 PROCEDURE — 90750 HZV VACC RECOMBINANT IM: CPT

## 2019-09-16 PROCEDURE — 90682 RIV4 VACC RECOMBINANT DNA IM: CPT

## 2019-09-16 NOTE — PROGRESS NOTES
Assessment/Plan:    No problem-specific Assessment & Plan notes found for this encounter  Diagnoses and all orders for this visit:    Occipital pain  -     Ambulatory referral to Physical Therapy; Future  Neck pain  -     Ambulatory referral to Physical Therapy; Future  Nonintractable headache, unspecified chronicity pattern, unspecified headache type  -     Ambulatory referral to Neurology; Future  I suspect pain is secondary to muscle spasm  I will have patient go for physical therapy  Unclear if white-matter lesions that was seen on previous MRI that patient recalls are significant  Due to her symptoms however I will have her see Neurology for further evaluation  Unfortunately I do not have access to the MRI report and patient does not recall where she had the MRI  She may have records at home and will call us if she is able to find something  Encounter for immunization  -     FLUBLOK: influenza vaccine, quadrivalent, recombinant, PF, 0 5 mL  -     Zoster Vaccine Recombinant IM  Flu vaccine and shingles vaccine given and tolerated well  Follow-up in one month as previously scheduled or sooner if needed  Subjective:      Patient ID: Dede Diaz is a 62 y o  female  Patient presents with headache/neck pain that has been occurring for about four weeks now  Location of the pain is the base of the skull in the back and does radiate into the neck  Has associated nausea, vomiting and heartburn symptoms with the pain  Symptoms are intermittent  No pain currently  They do spontaneously resolve  No injury  Patient does recall having an MRI about 20 years ago where she was told there is white-matter lesions in the occipital area        The following portions of the patient's history were reviewed and updated as appropriate:   She  has a past medical history of Abnormal Pap smear of cervix, Breast cancer ([facility] ) (02/06/2009), Cancer ([facility] [address] ), Carpal tunnel syndrome, CPAP (continuous positive airway pressure) dependence, History of radiation therapy (03/2009), Kidney stone, Osteoporosis, and Sleep apnea  She   Patient Active Problem List    Diagnosis Date Noted    Major depressive disorder with single episode, in full remission ([facility] ) 09/05/2018    Leg cramping 09/05/2018    CMC arthritis 08/20/2018    Abnormal vision of both eyes 08/10/2018    Carpal tunnel syndrome on left     Obstructive sleep apnea 05/23/2017    Environmental and seasonal allergies 05/10/2017    Hyperlipidemia 05/10/2017    Osteoporosis 05/10/2017    Vitamin D deficiency 05/10/2017    Prinzmetal's angina ([facility] ) 09/04/2012     She  has a past surgical history that includes Lithotripsy (Left); Cholecystectomy; Bladder suspension; pr colonoscopy flx dx w/collj spec when pfrmd (N/A, 7/10/2017); Colonoscopy; [place] lymph node biopsy; pr wrist arthroscop,release xvers lig (Left, 10/3/2018); and Breast lumpectomy (Right, 02/06/2009)  Her family history includes Breast cancer in her cousin; Breast cancer (age of onset: 36) in her maternal aunt; Breast cancer (age of onset: 40) in her sister; Cancer in her family; Colon polyps in her sister; Diabetes in her sister; Hypertension in her father and mother; Prostate cancer (age of onset: 79) in her father; Skin cancer in her father, mother, and sister; Thyroid disease in her family; Uterine cancer (age of onset: 48) in her sister  She  reports that she has never smoked  She has never used smokeless tobacco  She reports that she drinks alcohol  She reports that she does not use drugs    Current Outpatient Medications   Medication Sig Dispense Refill    alendronate (FOSAMAX) 70 mg tablet TAKE 1 TABLET BY MOUTH ONCE A WEEK 13 tablet 1    fluticasone (FLONASE) 50 mcg/act nasal spray 1 spray into each nostril daily 3 Bottle 1    montelukast (SINGULAIR) 10 mg tablet Take 1 tablet (10 mg total) by mouth daily at bedtime 90 tablet 1    PARoxetine (PAXIL) 10 mg tablet Take 1 tablet (10 mg total) by mouth daily 90 tablet 0     No current facility-administered medications for this visit  Current Outpatient Medications on File Prior to Visit   Medication Sig    alendronate (FOSAMAX) 70 mg tablet TAKE 1 TABLET BY MOUTH ONCE A WEEK    fluticasone (FLONASE) 50 mcg/act nasal spray 1 spray into each nostril daily    montelukast (SINGULAIR) 10 mg tablet Take 1 tablet (10 mg total) by mouth daily at bedtime    PARoxetine (PAXIL) 10 mg tablet Take 1 tablet (10 mg total) by mouth daily     No current facility-administered medications on file prior to visit  She has No Known Allergies       Review of Systems   Constitutional: Negative for fever  Respiratory: Negative for shortness of breath  Cardiovascular: Negative for chest pain  Objective:      /90 (BP Location: Left arm, Patient Position: Sitting, Cuff Size: Adult)   Pulse 89   Temp 97 6 °F (36 4 °C)   Wt 78 kg (172 lb)   SpO2 98%   BMI 30 47 kg/m²          Physical Exam   Constitutional: Vital signs are normal  She appears well-developed and well-nourished  HENT:   Head: Normocephalic and atraumatic  Right Ear: Tympanic membrane, external ear and ear canal normal    Left Ear: Tympanic membrane, external ear and ear canal normal    Nose: No mucosal edema or rhinorrhea  Mouth/Throat: Uvula is midline, oropharynx is clear and moist and mucous membranes are normal    Eyes: Pupils are equal, round, and reactive to light  Conjunctivae, EOM and lids are normal    Neck: Trachea normal  Neck supple  Cardiovascular: Normal rate, regular rhythm and normal heart sounds  No murmur heard  Pulmonary/Chest: Effort normal and breath sounds normal    Musculoskeletal:        Cervical back: She exhibits decreased range of motion, tenderness, pain and spasm  She exhibits no bony tenderness, no swelling, no edema, no deformity, no laceration and normal pulse  Lymphadenopathy:     She has no cervical adenopathy

## 2019-09-16 NOTE — TELEPHONE ENCOUNTER
Pt requested appt last night, via online that she had pain in the back of her head, indigestion, and vomiting

## 2019-09-23 ENCOUNTER — EVALUATION (OUTPATIENT)
Dept: PHYSICAL THERAPY | Facility: MEDICAL CENTER | Age: 59
End: 2019-09-23
Payer: COMMERCIAL

## 2019-09-23 DIAGNOSIS — M54.2 NECK PAIN: ICD-10-CM

## 2019-09-23 DIAGNOSIS — R51.9 OCCIPITAL PAIN: Primary | ICD-10-CM

## 2019-09-23 PROCEDURE — 97110 THERAPEUTIC EXERCISES: CPT | Performed by: PHYSICAL THERAPIST

## 2019-09-23 PROCEDURE — 97161 PT EVAL LOW COMPLEX 20 MIN: CPT | Performed by: PHYSICAL THERAPIST

## 2019-09-23 PROCEDURE — G8990 OTHER PT/OT CURRENT STATUS: HCPCS | Performed by: PHYSICAL THERAPIST

## 2019-09-23 PROCEDURE — G8991 OTHER PT/OT GOAL STATUS: HCPCS | Performed by: PHYSICAL THERAPIST

## 2019-09-23 NOTE — PROGRESS NOTES
PT Evaluation     Today's date: 2019  Patient name: Jordon Arceo  : 1960  MRN: [id number]  Referring provider: Faisal Nuñez DO  Dx:   Encounter Diagnosis     ICD-10-CM    1  Occipital pain R51 Ambulatory referral to Physical Therapy   2  Neck pain M54 2 Ambulatory referral to Physical Therapy       Start Time: 1700  Stop Time: 1745  Total time in clinic (min): 45 minutes    Assessment  Assessment details: Pt is a 62 y o female who presents with suboccipital pain, pain with activity, and tenderness in the cervical spine region b/l  These impairments limit the pateint from participating in hobbies such as riding ATVs, participating in social events and increased difficulty with work related tasks  Pt had no complaints of familiar symptoms with evaluation  Pt also had subjective improvements in "tightness" following treatment  Pt demonstrated increased difficulty with cervical muscle activation when performing chin tuck  Pt was educated on progress through PT  She was also educated to follow up with neurologist when able  I believe this patient is a good candidate for and will benefit from skilled physical therapy for postural exercises to improve posture and postural awareness, cervical ROM exercises to prevent catching and decrease symptoms, and manual therapy for the suboccipital region to reduce symptoms and improve function      Further examination of pathological reflexes and DNF endurance will be completed NV    Positive Prognostic Indicators: good attitude towards PT    Negative Prognostic Indicators: MRI results showing lesion of occipital lobe (patient reported), other symptom involvement  Impairments: abnormal muscle firing, abnormal or restricted ROM, activity intolerance, lacks appropriate home exercise program, pain with function and poor posture   Understanding of Dx/Px/POC: good   Prognosis: good    Goals  STGs: 4 weeks  1) pt will have improved R lateral cervical flexion ROM to "minimally restricted"  2) pt will have improved cervical retraction ROM to "minimally restricted"    LTGs: 8 weeks  1) pt will be independent with HEP by D/C  2) pt will be independent with symptom management by D/C  3) pt will have subjective report of decreased intensity and frequency of HAs by DC  Plan  Patient would benefit from: skilled physical therapy  Planned modality interventions: cryotherapy and thermotherapy: hydrocollator packs  Planned therapy interventions: manual therapy, neuromuscular re-education, strengthening, stretching, therapeutic activities, therapeutic exercise, patient education, home exercise program and postural training  Frequency: 2x week  Duration in weeks: 6  Plan of Care beginning date: 9/23/2019  Plan of Care expiration date: 11/4/2019  Treatment plan discussed with: patient        Subjective Evaluation    History of Present Illness  Mechanism of injury: DOO: 5 weeks ago  DARIEN:      Subjective Comments: pt stated that she has been getting HA that start in this area  She states that her vision will become blury and will vomit  She states that the L side of her neck will tighten up  She states that when she applies pressure to the area, it will make her feel better  Pt had MRI which showed changes in the grey matter of the occipital lobe  She also states when HA goes away the symptoms go away  She states that she becomes very fatigued the following day  Pt denies other senses changing or auras  Pt states that the only issue with the neck is that occassionally she feels something "gets stuck" and will limit motion, will break loose and then motion is fine  Pt denies radicular symptoms or UE joint pain  Pain   Rest: 0/10   Best: 0/10   Worst: 6-7/10- HA once a week (R,F or sat) always; late afternoon to early evening (Sunday Monday she is fatigued;  Tuesday Wednesday- she is fine    Headaches can last 30 mins to a couple hours    Relieving Factors: pressure, tylenol, Exacerbating Factors:    Sleeping: good    ADLs: independent    Work/Hobbies:  - HA at work    Previous Treatment: medications- tyenol    Goals:  HA improvement, rides ATVs            Objective     Static Posture     Head  Forward  Shoulders  Rounded  Scapulae  Left protracted and right protracted  Palpation     Right   Tenderness of the levator scapulae and upper trapezius  Neurological Testing     Sensation   Cervical/Thoracic   Left   Intact: light touch    Right   Intact: light touch    Active Range of Motion   Cervical/Thoracic Spine       Cervical    Subcranial retraction:   Restriction level: moderate  Flexion:  Restriction level: minimal  Extension:  Restriction level: moderate  Left lateral flexion:  Restriction level: minimal  Right lateral flexion:  Restriction level moderate  Left rotation:  Restriction level: minimal  Right rotation:  Restriction level: minimal  Left Shoulder   Normal active range of motion    Right Shoulder   Normal active range of motion    Joint Play   Joints within functional limits: C3, C4 and C5     Hypomobile: C6, C7 and T1     Strength/Myotome Testing     Left Shoulder     Planes of Motion   Flexion: 4   Abduction: 4   External rotation at 0°: 4   Internal rotation at 0°: 4     Right Shoulder     Planes of Motion   Flexion: 4   Abduction: 4   External rotation at 0°: 4   Internal rotation at 0°: 4     Left Elbow   Flexion: 4  Extension: 4    Right Elbow   Flexion: 4  Extension: 4    Left Wrist/Hand   Wrist extension: 4+  Wrist flexion: 4+    Right Wrist/Hand   Wrist extension: 4+  Wrist flexion: 4+    Tests   Pain with max opening    Cervical   Positive vertical compression  Negative lumbar distraction  Lumbar   Positive vertical compression        Additional Tests Details  Max opening pain on the L when opening L      Flowsheet Rows      Most Recent Value   PT/OT G-Codes   Current Score  78   Projected Score  78   Assessment Type  Evaluation   G code set  Other PT/OT Primary   Other PT Primary Current Status ()  CJ   Other PT Primary Goal Status ()  CJ             Precautions: depression,  Hx of angina, osteoporosis       Manual                                                                                   Exercise Diary  9/23            UT/LS stretch 30"x3            Chin tucks 2"x10            scap retract 5"x10                                                                                                                                                                                                                                             Modalities

## 2019-09-26 ENCOUNTER — OFFICE VISIT (OUTPATIENT)
Dept: PHYSICAL THERAPY | Facility: MEDICAL CENTER | Age: 59
End: 2019-09-26
Payer: COMMERCIAL

## 2019-09-26 DIAGNOSIS — R51.9 OCCIPITAL PAIN: Primary | ICD-10-CM

## 2019-09-26 DIAGNOSIS — M54.2 NECK PAIN: ICD-10-CM

## 2019-09-26 PROCEDURE — 97112 NEUROMUSCULAR REEDUCATION: CPT | Performed by: PHYSICAL THERAPIST

## 2019-09-26 PROCEDURE — 97140 MANUAL THERAPY 1/> REGIONS: CPT | Performed by: PHYSICAL THERAPIST

## 2019-09-26 PROCEDURE — 97110 THERAPEUTIC EXERCISES: CPT | Performed by: PHYSICAL THERAPIST

## 2019-09-26 NOTE — PROGRESS NOTES
Daily Note     Today's date: 2019  Patient name: Ashley Osman  : 1960  MRN: [id number]  Referring provider: Rain Rosario DO  Dx:   Encounter Diagnosis     ICD-10-CM    1  Occipital pain R51    2  Neck pain M54 2        Start Time: 1550  Stop Time: 1630  Total time in clinic (min): 40 minutes    Subjective: pt stated that today she had a bad day  She had a HA all day and it was not a usual HA  However, she did not get sick from the HA  Objective: See treatment diary below      Assessment: Tolerated treatment well  Pt reported to PT with pain and a HA  Post manuals, pt reported improvement in tightness in head and abolishment of HA  Pt had minor complaints of pulling in the upper trap with DNF end  Exercise, but no complaints of normal symptoms  We will continue to monitor and progress the patient as tolerated  Patient would benefit from continued PT      Plan: Continue per plan of care  Precautions: depression,  Hx of angina, osteoporosis       Manual              IASTM  10'                                                                    Exercise Diary             UT/LS stretch 30"x3 30"x3           Chin tucks 2"x10 2"x10           scap retract 5"x10 5"x10           Arm bike  5' bw           TB rows/ext  rtb 2x10 ea             DNF end   5"x10           No monies  rtb 2x10           Arm monster walks  ytb 5 laps                                                                                                                                                                           Modalities

## 2019-09-30 ENCOUNTER — OFFICE VISIT (OUTPATIENT)
Dept: PHYSICAL THERAPY | Facility: MEDICAL CENTER | Age: 59
End: 2019-09-30
Payer: COMMERCIAL

## 2019-09-30 DIAGNOSIS — M54.2 NECK PAIN: ICD-10-CM

## 2019-09-30 DIAGNOSIS — R51.9 OCCIPITAL PAIN: Primary | ICD-10-CM

## 2019-09-30 PROCEDURE — 97112 NEUROMUSCULAR REEDUCATION: CPT | Performed by: PHYSICAL THERAPIST

## 2019-09-30 PROCEDURE — 97110 THERAPEUTIC EXERCISES: CPT | Performed by: PHYSICAL THERAPIST

## 2019-10-14 ENCOUNTER — TELEPHONE (OUTPATIENT)
Dept: OBGYN CLINIC | Facility: CLINIC | Age: 59
End: 2019-10-14

## 2019-10-14 ENCOUNTER — OFFICE VISIT (OUTPATIENT)
Dept: OBGYN CLINIC | Facility: MEDICAL CENTER | Age: 59
End: 2019-10-14
Payer: COMMERCIAL

## 2019-10-14 ENCOUNTER — APPOINTMENT (OUTPATIENT)
Dept: LAB | Facility: MEDICAL CENTER | Age: 59
End: 2019-10-14
Payer: COMMERCIAL

## 2019-10-14 ENCOUNTER — CLINICAL SUPPORT (OUTPATIENT)
Dept: URGENT CARE | Facility: MEDICAL CENTER | Age: 59
End: 2019-10-14
Payer: COMMERCIAL

## 2019-10-14 ENCOUNTER — APPOINTMENT (OUTPATIENT)
Dept: RADIOLOGY | Facility: MEDICAL CENTER | Age: 59
End: 2019-10-14
Payer: COMMERCIAL

## 2019-10-14 VITALS
HEIGHT: 63 IN | HEART RATE: 74 BPM | DIASTOLIC BLOOD PRESSURE: 67 MMHG | BODY MASS INDEX: 30.48 KG/M2 | SYSTOLIC BLOOD PRESSURE: 109 MMHG | WEIGHT: 172 LBS

## 2019-10-14 DIAGNOSIS — M25.531 RIGHT WRIST PAIN: ICD-10-CM

## 2019-10-14 DIAGNOSIS — R22.33: ICD-10-CM

## 2019-10-14 DIAGNOSIS — Z01.812 PRE-OPERATIVE LABORATORY EXAMINATION: ICD-10-CM

## 2019-10-14 DIAGNOSIS — M18.12 PRIMARY OSTEOARTHRITIS OF FIRST CARPOMETACARPAL JOINT OF LEFT HAND: ICD-10-CM

## 2019-10-14 DIAGNOSIS — M18.12 PRIMARY OSTEOARTHRITIS OF FIRST CARPOMETACARPAL JOINT OF LEFT HAND: Primary | ICD-10-CM

## 2019-10-14 LAB
ANION GAP SERPL CALCULATED.3IONS-SCNC: 6 MMOL/L (ref 4–13)
BASOPHILS # BLD AUTO: 0.03 THOUSANDS/ΜL (ref 0–0.1)
BASOPHILS NFR BLD AUTO: 1 % (ref 0–1)
BUN SERPL-MCNC: 12 MG/DL (ref 5–25)
CALCIUM SERPL-MCNC: 9.1 MG/DL (ref 8.3–10.1)
CHLORIDE SERPL-SCNC: 110 MMOL/L (ref 100–108)
CO2 SERPL-SCNC: 24 MMOL/L (ref 21–32)
CREAT SERPL-MCNC: 0.79 MG/DL (ref 0.6–1.3)
EOSINOPHIL # BLD AUTO: 0.15 THOUSAND/ΜL (ref 0–0.61)
EOSINOPHIL NFR BLD AUTO: 2 % (ref 0–6)
ERYTHROCYTE [DISTWIDTH] IN BLOOD BY AUTOMATED COUNT: 12.9 % (ref 11.6–15.1)
GFR SERPL CREATININE-BSD FRML MDRD: 83 ML/MIN/1.73SQ M
GLUCOSE SERPL-MCNC: 81 MG/DL (ref 65–140)
HCT VFR BLD AUTO: 41.2 % (ref 34.8–46.1)
HGB BLD-MCNC: 13.2 G/DL (ref 11.5–15.4)
IMM GRANULOCYTES # BLD AUTO: 0.02 THOUSAND/UL (ref 0–0.2)
IMM GRANULOCYTES NFR BLD AUTO: 0 % (ref 0–2)
LYMPHOCYTES # BLD AUTO: 2.19 THOUSANDS/ΜL (ref 0.6–4.47)
LYMPHOCYTES NFR BLD AUTO: 34 % (ref 14–44)
MCH RBC QN AUTO: 30.6 PG (ref 26.8–34.3)
MCHC RBC AUTO-ENTMCNC: 32 G/DL (ref 31.4–37.4)
MCV RBC AUTO: 95 FL (ref 82–98)
MONOCYTES # BLD AUTO: 0.53 THOUSAND/ΜL (ref 0.17–1.22)
MONOCYTES NFR BLD AUTO: 8 % (ref 4–12)
NEUTROPHILS # BLD AUTO: 3.6 THOUSANDS/ΜL (ref 1.85–7.62)
NEUTS SEG NFR BLD AUTO: 55 % (ref 43–75)
NRBC BLD AUTO-RTO: 0 /100 WBCS
PLATELET # BLD AUTO: 250 THOUSANDS/UL (ref 149–390)
PMV BLD AUTO: 10.1 FL (ref 8.9–12.7)
POTASSIUM SERPL-SCNC: 3.5 MMOL/L (ref 3.5–5.3)
RBC # BLD AUTO: 4.32 MILLION/UL (ref 3.81–5.12)
SODIUM SERPL-SCNC: 140 MMOL/L (ref 136–145)
WBC # BLD AUTO: 6.52 THOUSAND/UL (ref 4.31–10.16)

## 2019-10-14 PROCEDURE — 36415 COLL VENOUS BLD VENIPUNCTURE: CPT

## 2019-10-14 PROCEDURE — 85025 COMPLETE CBC W/AUTO DIFF WBC: CPT

## 2019-10-14 PROCEDURE — 80048 BASIC METABOLIC PNL TOTAL CA: CPT

## 2019-10-14 PROCEDURE — 73110 X-RAY EXAM OF WRIST: CPT

## 2019-10-14 PROCEDURE — 99214 OFFICE O/P EST MOD 30 MIN: CPT | Performed by: ORTHOPAEDIC SURGERY

## 2019-10-14 PROCEDURE — 93005 ELECTROCARDIOGRAM TRACING: CPT

## 2019-10-14 RX ORDER — CEFAZOLIN SODIUM 1 G/50ML
1000 SOLUTION INTRAVENOUS ONCE
Status: CANCELLED | OUTPATIENT
Start: 2019-10-14 | End: 2019-10-14

## 2019-10-14 RX ORDER — OXYCODONE HYDROCHLORIDE AND ACETAMINOPHEN 5; 325 MG/1; MG/1
1 TABLET ORAL EVERY 4 HOURS PRN
Qty: 30 TABLET | Refills: 0 | Status: SHIPPED | OUTPATIENT
Start: 2019-10-14 | End: 2019-10-31

## 2019-10-14 NOTE — PROGRESS NOTES
CHIEF COMPLAINT:  Chief Complaint   Patient presents with    Left Wrist - Follow-up    Right Wrist - Follow-up       SUBJECTIVE:  Kathy Rodarte is a 62y o  year old RHD female who presents to the office with 2 issues  1) pt would like to discuss left thumb CMC suspension plasty after she has had several CSI that have only provided her with temporary relief  Pt states that she continues to have pain and difficulty grasping and lifting  Pt states that she would like to consider a more permanent treatment  2) Pt states that she has a lump on the dorsal aspect of her right wrist  Pt states that she has some discomfort with motion of her right wrist     The patient denies any cardiac or pulmonary issues  Denies diabetes  Denies any history of MI, gastric ulcers, kidney or liver issues  Denies blood thinners  PAST MEDICAL HISTORY:  Past Medical History:   Diagnosis Date    Abnormal Pap smear of cervix     Breast cancer ([facility] [address] ) 02/06/2009    right breast    Cancer ([facility] [address] )     Right breast cancer R lymph node bx neg  Last assessed: 6/4/13    Carpal tunnel syndrome     CPAP (continuous positive airway pressure) dependence     History of radiation therapy 03/2009    Kidney stone     Last assessed: 1/7/16    Osteoporosis     Sleep apnea        PAST SURGICAL HISTORY:  Past Surgical History:   Procedure Laterality Date    BLADDER SUSPENSION      BREAST LUMPECTOMY Right 02/06/2009    malignant    CHOLECYSTECTOMY      COLONOSCOPY      LITHOTRIPSY Left     KS COLONOSCOPY FLX DX W/COLLJ SPEC WHEN PFRMD N/A 7/10/2017    Procedure: COLONOSCOPY;  Surgeon: Nathan Pacheco MD;  [facility];   Service: Gastroenterology    KS WRIST Ilene Rands LIG Left 10/3/2018    Procedure: ENDOSCOPIC CARPAL TUNNEL RELEASE;  Surgeon: Sarita Marcus MD;  Location: [facility];  Service: Orthopedics    SENTINEL LYMPH NODE BIOPSY         FAMILY HISTORY:  Family History   Problem Relation Age of Onset    Skin cancer Mother     Hypertension Mother     Skin cancer Father     Hypertension Father     Prostate cancer Father 79        2 bouts    Diabetes Sister         Mellitus    Breast cancer Sister 40    Uterine cancer Sister 48    Skin cancer Sister     Colon polyps Sister         Sigmoid    Cancer Family     Thyroid disease Family         Disorder    Breast cancer Maternal Aunt 40        mets    Breast cancer Cousin        SOCIAL HISTORY:  Social History     Tobacco Use    Smoking status: Never Smoker    Smokeless tobacco: Never Used   Substance Use Topics    Alcohol use: Yes     Frequency: Monthly or less     Drinks per session: 1 or 2     Comment: Rare    Drug use: No       MEDICATIONS:    Current Outpatient Medications:     alendronate (FOSAMAX) 70 mg tablet, TAKE 1 TABLET BY MOUTH ONCE A WEEK, Disp: 13 tablet, Rfl: 1    fluticasone (FLONASE) 50 mcg/act nasal spray, 1 spray into each nostril daily, Disp: 3 Bottle, Rfl: 1    montelukast (SINGULAIR) 10 mg tablet, Take 1 tablet (10 mg total) by mouth daily at bedtime, Disp: 90 tablet, Rfl: 1    PARoxetine (PAXIL) 10 mg tablet, Take 1 tablet (10 mg total) by mouth daily, Disp: 90 tablet, Rfl: 0    oxyCODONE-acetaminophen (PERCOCET) 5-325 mg per tablet, Take 1 tablet by mouth every 4 (four) hours as needed for moderate pain To be taken after surgeryMax Daily Amount: 6 tablets, Disp: 30 tablet, Rfl: 0    ALLERGIES:  No Known Allergies    REVIEW OF SYSTEMS:  Review of Systems   Constitutional: Negative for chills, fever and unexpected weight change  HENT: Negative for hearing loss, nosebleeds and sore throat  Eyes: Negative for pain, redness and visual disturbance  Respiratory: Negative for cough, shortness of breath and wheezing  Cardiovascular: Negative for chest pain, palpitations and leg swelling  Gastrointestinal: Negative for abdominal pain, nausea and vomiting  Endocrine: Negative for polydipsia and polyuria     Genitourinary: Negative for dysuria and hematuria  Skin: Negative for rash and wound  Neurological: Negative for dizziness and headaches  Psychiatric/Behavioral: Negative for decreased concentration, dysphoric mood and suicidal ideas  The patient is not nervous/anxious  VITALS:  Vitals:    10/14/19 1013   BP: 109/67   Pulse: 74       LABS:  HgA1c: No results found for: HGBA1C  BMP:   Lab Results   Component Value Date    CALCIUM 9 1 10/14/2019    K 3 5 10/14/2019    CO2 24 10/14/2019     (H) 10/14/2019    BUN 12 10/14/2019    CREATININE 0 79 10/14/2019       _____________________________________________________  PHYSICAL EXAMINATION:  General: well developed and well nourished, alert, oriented times 3 and appears comfortable  Psychiatric: Normal  HEENT: Trachea Midline, No torticollis  Cardiac:  Regular rate and rhythm  Pulmonary: No respiratory distress  Lung sounds clear to auscultation  Skin: No masses, erythema, lacerations, fluctation, ulcerations  Neurovascular: Sensation Intact to the Median, Ulnar, Radial Nerve, Motor Intact to the Median, Ulnar, Radial Nerve and Pulses Intact    MUSCULOSKELETAL EXAMINATION:  left CMC Exam:  No adduction contracture  No hyperextension deformity of MCP joint  Positive localized tenderness over radial and dorsal aspect of thumb (CMC joint)  Grind test is Positive for pain and Positive for crepitus  No triggering or tenderness over the A1 pulley  No pain with Finkelsteins maneuver     5cm x 1cm palpable mass over the [facility] of left wrist         Right wrist  Palpable cyst over right CMC  No pain with palpation of the cyst     ___________________________________________________  STUDIES REVIEWED:  Images obtained today of the right wrist  3 views demonstrate no osseous abnormality      PROCEDURES PERFORMED:  Procedures  No Procedures performed today    _____________________________________________________  ASSESSMENT/PLAN:      Right hand cyst  * Pt was advised that the cyst is likely due to cmc OA and was advised to continue to monitor at this time  * Pt was advised that surgical excision is not advised at the same time as left thumb CMC suspension-plasty     Left CMC osteoarthrosis   Left wrist mass- over [facility] - likely lipoma   Pt has failed conservative measures  [facility] suspension-plasty was discussed at length today including the risks and benefits  Pt understands and wants to proceed  *Surgery-left cmc suspension-plasty          Excision biopsy left wrist mass    * detailed consent was signed in the office   * anesthesia- regional with sedation     * antibiotics- ordered    * OT order was placed   * Post op medication was sent to the office on file    Surgery medication instructions: You will stop eating and drinking at midnight the night before your surgery, but you may continue to take your normal medications with a small sip of water  In the morning on the day of your surgery, we would like you to take the following medications (as long as you have never been told to avoid Tylenol or NSAIDs like ibuprofen, Naproxen, Aleve, Advil, etc):   Ibuprofen 600mg one tablet by mouth   Tylenol 500mg one tablet by mouth    After surgery, we would like you to take Ibuprofen 600mg one tablet by mouth every 6 hours with food (at breakfast, lunch and dinner)  AND Tylenol 500 mg one tablet by mouth every 6 hours  (at breakfast, lunch and dinner) for 5-7 days after your surgery  Please take these medication EVERYDAY after surgery for 5-7 days, and not just as needed  You can take these medications at the same time  Taking these medications after surgery will limit your need for prescription pain medication  We will also prescribe a narcotic pain medication for a limited time after surgery that you can take as needed for moderate or severe pain               Diagnoses and all orders for this visit:    Primary osteoarthritis of first carpometacarpal joint of left hand  - Ambulatory referral to PT/OT hand therapy  -     CBC and differential; Future  -     Basic metabolic panel; Future  -     ECG 12 lead; Future  -     Case request operating room: 1) LEFT THUMB SUSPENSIONPLASTY  2) left thumb lipoma excision; Standing  -     oxyCODONE-acetaminophen (PERCOCET) 5-325 mg per tablet; Take 1 tablet by mouth every 4 (four) hours as needed for moderate pain To be taken after surgeryMax Daily Amount: 6 tablets  -     Case request operating room: 1) LEFT THUMB SUSPENSIONPLASTY  2) left thumb lipoma excision    Right wrist pain  -     XR wrist 3+ vw right; Future    Pre-operative laboratory examination  -     CBC and differential; Future  -     Basic metabolic panel; Future  -     ECG 12 lead; Future    Mass of both wrists    Other orders  -     Diet NPO; Sips with meds; Standing  -     Height and weight upon arrival; Standing  -     Void on call to OR; Standing  -     Insert peripheral IV; Standing  -     ceFAZolin (ANCEF) IVPB (premix) 1,000 mg        Follow Up:  Return for after surgery  Work/school status:  Provided   To Do Next Visit:  Re-evaluation of current issue      Operative Discussions:  Thumb CMC suspensionplasty:   The anatomy and physiology of carpometacarpal joint arthritis was discussed with the patient today in the office  Deterioration of the articular cartilage eventually leads to hypermobility at the thumb [facility] joint, resulting in joint subluxation, osteophyte formation, cystic changes within the trapezium and base of the first metacarpal, as well as subchondral sclerosis  Eventually, pain, limited mobility, and compensatory hyperextension at the metacarpophalangeal joint may develop  While normal activity and usage of the thumb joint may provide a painful experience to the patient, this typically does not result in damage to the thumb or hand  Treatment options include resting thumb spica splints to decreased joint edema, pain, and inflammation    Therapy exercises to strengthen the thenar musculature may relieve pain, but do not alter the overall continued development of osteoarthritis  Oral medications, topical medications, corticosteroid injections may decrease pain and increase overall function  Eventually, approximately 5% of patients may require surgical intervention  The patient has elected to undergo thumb reconstruction (suspensionplasty)  The operation was explained to the patient and the patient expressed verbal understanding  The risks and benefit of surgery were discussed with the patient  After the procedure, the pain will be decreased, the function improved, and the strength improved  The potential for numbness around the incision site was also discussed  The patient is aware that rehabilitation is a 3 month process divided into 3 stages  The first 4weeks consists of rest to the surgical site - the bulky dressing applied after surgery will be replaced by a removable splint for 4 weeks  This removable splint may be removed for showering, bathing, and daily therapy exercises  At 4 weeks postoperatively, the splint will be discontinued and thumb motion exercises will continue for the next 4 weeks  The following 4 weeks the patient will begin strengthening exercises  The patient is aware that the average total recovery time is approximately 3-4 months  Success rate is approximately 90-95%  The risks and benefits of the procedure were explained to the patient, which include, but are not limited to: Bleeding, infection, recurrence, pain, scar, damage to tendons, damage to nerves, and damage to blood vessels, failure to give desired results and complications related to anesthesia  These risks, along with alternative conservative treatment options, and postoperative protocols were voiced back and understood by the patient  All questions were answered to the patient's satisfaction    The patient agrees to comply with a standard postoperative protocol, and is willing to proceed  Education was provided via written and auditory forms  There were no barriers to learning  Written handouts regarding wound care, incision and scar care, and general preoperative information was provided to the patient  Prior to surgery, the patient may be requested to stop all anti-inflammatory medications  Prophylactic aspirin, Plavix, and Coumadin may be allowed to be continued  Medications including vitamin E , ginkgo, and fish oil are requested to be stopped approximately one week prior to surgery  Hypertensive medications and beta blockers, if taken, should be continued      Scribe Attestation    I,:   Carina Parr am acting as a scribe while in the presence of the attending physician :        I,:   Heather Spence MD personally performed the services described in this documentation    as scribed in my presence :

## 2019-10-14 NOTE — LETTER
October 14, 2019     Patient: Eliud Martinez   YOB: 1960   Date of Visit: 10/14/2019       To Whom it May Concern:    Osbaldo Dominguez is under my professional care  She was seen in my office on 10/14/2019  She is having surgery 10/23/19 she may not return to work until cleared by physician  If you have any questions or concerns, please don't hesitate to call           Sincerely,          Gigi Ferris MD        CC: No Recipients

## 2019-10-14 NOTE — H&P (VIEW-ONLY)
CHIEF COMPLAINT:  Chief Complaint   Patient presents with    Left Wrist - Follow-up    Right Wrist - Follow-up       SUBJECTIVE:  Eliud Martinez is a 62y o  year old RHD female who presents to the office with 2 issues  1) pt would like to discuss left thumb CMC suspension plasty after she has had several CSI that have only provided her with temporary relief  Pt states that she continues to have pain and difficulty grasping and lifting  Pt states that she would like to consider a more permanent treatment  2) Pt states that she has a lump on the dorsal aspect of her right wrist  Pt states that she has some discomfort with motion of her right wrist     The patient denies any cardiac or pulmonary issues  Denies diabetes  Denies any history of MI, gastric ulcers, kidney or liver issues  Denies blood thinners  PAST MEDICAL HISTORY:  Past Medical History:   Diagnosis Date    Abnormal Pap smear of cervix     Breast cancer ([facility] [address] ) 02/06/2009    right breast    Cancer ([facility] [address] )     Right breast cancer R lymph node bx neg  Last assessed: 6/4/13    Carpal tunnel syndrome     CPAP (continuous positive airway pressure) dependence     History of radiation therapy 03/2009    Kidney stone     Last assessed: 1/7/16    Osteoporosis     Sleep apnea        PAST SURGICAL HISTORY:  Past Surgical History:   Procedure Laterality Date    BLADDER SUSPENSION      BREAST LUMPECTOMY Right 02/06/2009    malignant    CHOLECYSTECTOMY      COLONOSCOPY      LITHOTRIPSY Left     IA COLONOSCOPY FLX DX W/COLLJ SPEC WHEN PFRMD N/A 7/10/2017    Procedure: COLONOSCOPY;  Surgeon: Moisés Davis MD;  [facility];   Service: Gastroenterology    IA WRIST [facility] LIG Left 10/3/2018    Procedure: ENDOSCOPIC CARPAL TUNNEL RELEASE;  Surgeon: Gigi Ferris MD;  Location: [facility];  Service: Orthopedics    SENTINEL LYMPH NODE BIOPSY         FAMILY HISTORY:  Family History   Problem Relation Age of Onset    Skin cancer Mother     Hypertension Mother     Skin cancer Father     Hypertension Father     Prostate cancer Father 79        2 bouts    Diabetes Sister         Mellitus    Breast cancer Sister 40    Uterine cancer Sister 48    Skin cancer Sister     Colon polyps Sister         Sigmoid    Cancer Family     Thyroid disease Family         Disorder    Breast cancer Maternal Aunt 40        mets    Breast cancer Cousin        SOCIAL HISTORY:  Social History     Tobacco Use    Smoking status: Never Smoker    Smokeless tobacco: Never Used   Substance Use Topics    Alcohol use: Yes     Frequency: Monthly or less     Drinks per session: 1 or 2     Comment: Rare    Drug use: No       MEDICATIONS:    Current Outpatient Medications:     alendronate (FOSAMAX) 70 mg tablet, TAKE 1 TABLET BY MOUTH ONCE A WEEK, Disp: 13 tablet, Rfl: 1    fluticasone (FLONASE) 50 mcg/act nasal spray, 1 spray into each nostril daily, Disp: 3 Bottle, Rfl: 1    montelukast (SINGULAIR) 10 mg tablet, Take 1 tablet (10 mg total) by mouth daily at bedtime, Disp: 90 tablet, Rfl: 1    PARoxetine (PAXIL) 10 mg tablet, Take 1 tablet (10 mg total) by mouth daily, Disp: 90 tablet, Rfl: 0    oxyCODONE-acetaminophen (PERCOCET) 5-325 mg per tablet, Take 1 tablet by mouth every 4 (four) hours as needed for moderate pain To be taken after surgeryMax Daily Amount: 6 tablets, Disp: 30 tablet, Rfl: 0    ALLERGIES:  No Known Allergies    REVIEW OF SYSTEMS:  Review of Systems   Constitutional: Negative for chills, fever and unexpected weight change  HENT: Negative for hearing loss, nosebleeds and sore throat  Eyes: Negative for pain, redness and visual disturbance  Respiratory: Negative for cough, shortness of breath and wheezing  Cardiovascular: Negative for chest pain, palpitations and leg swelling  Gastrointestinal: Negative for abdominal pain, nausea and vomiting  Endocrine: Negative for polydipsia and polyuria     Genitourinary: Negative for dysuria and hematuria  Skin: Negative for rash and wound  Neurological: Negative for dizziness and headaches  Psychiatric/Behavioral: Negative for decreased concentration, dysphoric mood and suicidal ideas  The patient is not nervous/anxious  VITALS:  Vitals:    10/14/19 1013   BP: 109/67   Pulse: 74       LABS:  HgA1c: No results found for: HGBA1C  BMP:   Lab Results   Component Value Date    CALCIUM 9 1 10/14/2019    K 3 5 10/14/2019    CO2 24 10/14/2019     (H) 10/14/2019    BUN 12 10/14/2019    CREATININE 0 79 10/14/2019       _____________________________________________________  PHYSICAL EXAMINATION:  General: well developed and well nourished, alert, oriented times 3 and appears comfortable  Psychiatric: Normal  HEENT: Trachea Midline, No torticollis  Cardiac:  Regular rate and rhythm  Pulmonary: No respiratory distress  Lung sounds clear to auscultation  Skin: No masses, erythema, lacerations, fluctation, ulcerations  Neurovascular: Sensation Intact to the Median, Ulnar, Radial Nerve, Motor Intact to the Median, Ulnar, Radial Nerve and Pulses Intact    MUSCULOSKELETAL EXAMINATION:  left CMC Exam:  No adduction contracture  No hyperextension deformity of MCP joint  Positive localized tenderness over radial and dorsal aspect of thumb (CMC joint)  Grind test is Positive for pain and Positive for crepitus  No triggering or tenderness over the A1 pulley  No pain with Finkelsteins maneuver     5cm x 1cm palpable mass over the [facility] of left wrist         Right wrist  Palpable cyst over right CMC  No pain with palpation of the cyst     ___________________________________________________  STUDIES REVIEWED:  Images obtained today of the right wrist  3 views demonstrate no osseous abnormality      PROCEDURES PERFORMED:  Procedures  No Procedures performed today    _____________________________________________________  ASSESSMENT/PLAN:      Right hand cyst  * Pt was advised that the cyst is likely due to cmc OA and was advised to continue to monitor at this time  * Pt was advised that surgical excision is not advised at the same time as left thumb CMC suspension-plasty     Left CMC osteoarthrosis   Left wrist mass- over [facility] - likely lipoma   Pt has failed conservative measures  [facility] suspension-plasty was discussed at length today including the risks and benefits  Pt understands and wants to proceed  *Surgery-left cmc suspension-plasty          Excision biopsy left wrist mass    * detailed consent was signed in the office   * anesthesia- regional with sedation     * antibiotics- ordered    * OT order was placed   * Post op medication was sent to the office on file    Surgery medication instructions: You will stop eating and drinking at midnight the night before your surgery, but you may continue to take your normal medications with a small sip of water  In the morning on the day of your surgery, we would like you to take the following medications (as long as you have never been told to avoid Tylenol or NSAIDs like ibuprofen, Naproxen, Aleve, Advil, etc):   Ibuprofen 600mg one tablet by mouth   Tylenol 500mg one tablet by mouth    After surgery, we would like you to take Ibuprofen 600mg one tablet by mouth every 6 hours with food (at breakfast, lunch and dinner)  AND Tylenol 500 mg one tablet by mouth every 6 hours  (at breakfast, lunch and dinner) for 5-7 days after your surgery  Please take these medication EVERYDAY after surgery for 5-7 days, and not just as needed  You can take these medications at the same time  Taking these medications after surgery will limit your need for prescription pain medication  We will also prescribe a narcotic pain medication for a limited time after surgery that you can take as needed for moderate or severe pain               Diagnoses and all orders for this visit:    Primary osteoarthritis of first carpometacarpal joint of left hand  - Ambulatory referral to PT/OT hand therapy  -     CBC and differential; Future  -     Basic metabolic panel; Future  -     ECG 12 lead; Future  -     Case request operating room: 1) LEFT THUMB SUSPENSIONPLASTY  2) left thumb lipoma excision; Standing  -     oxyCODONE-acetaminophen (PERCOCET) 5-325 mg per tablet; Take 1 tablet by mouth every 4 (four) hours as needed for moderate pain To be taken after surgeryMax Daily Amount: 6 tablets  -     Case request operating room: 1) LEFT THUMB SUSPENSIONPLASTY  2) left thumb lipoma excision    Right wrist pain  -     XR wrist 3+ vw right; Future    Pre-operative laboratory examination  -     CBC and differential; Future  -     Basic metabolic panel; Future  -     ECG 12 lead; Future    Mass of both wrists    Other orders  -     Diet NPO; Sips with meds; Standing  -     Height and weight upon arrival; Standing  -     Void on call to OR; Standing  -     Insert peripheral IV; Standing  -     ceFAZolin (ANCEF) IVPB (premix) 1,000 mg        Follow Up:  Return for after surgery  Work/school status:  Provided   To Do Next Visit:  Re-evaluation of current issue      Operative Discussions:  Thumb CMC suspensionplasty:   The anatomy and physiology of carpometacarpal joint arthritis was discussed with the patient today in the office  Deterioration of the articular cartilage eventually leads to hypermobility at the thumb [facility] joint, resulting in joint subluxation, osteophyte formation, cystic changes within the trapezium and base of the first metacarpal, as well as subchondral sclerosis  Eventually, pain, limited mobility, and compensatory hyperextension at the metacarpophalangeal joint may develop  While normal activity and usage of the thumb joint may provide a painful experience to the patient, this typically does not result in damage to the thumb or hand  Treatment options include resting thumb spica splints to decreased joint edema, pain, and inflammation    Therapy exercises to strengthen the thenar musculature may relieve pain, but do not alter the overall continued development of osteoarthritis  Oral medications, topical medications, corticosteroid injections may decrease pain and increase overall function  Eventually, approximately 5% of patients may require surgical intervention  The patient has elected to undergo thumb reconstruction (suspensionplasty)  The operation was explained to the patient and the patient expressed verbal understanding  The risks and benefit of surgery were discussed with the patient  After the procedure, the pain will be decreased, the function improved, and the strength improved  The potential for numbness around the incision site was also discussed  The patient is aware that rehabilitation is a 3 month process divided into 3 stages  The first 4weeks consists of rest to the surgical site - the bulky dressing applied after surgery will be replaced by a removable splint for 4 weeks  This removable splint may be removed for showering, bathing, and daily therapy exercises  At 4 weeks postoperatively, the splint will be discontinued and thumb motion exercises will continue for the next 4 weeks  The following 4 weeks the patient will begin strengthening exercises  The patient is aware that the average total recovery time is approximately 3-4 months  Success rate is approximately 90-95%  The risks and benefits of the procedure were explained to the patient, which include, but are not limited to: Bleeding, infection, recurrence, pain, scar, damage to tendons, damage to nerves, and damage to blood vessels, failure to give desired results and complications related to anesthesia  These risks, along with alternative conservative treatment options, and postoperative protocols were voiced back and understood by the patient  All questions were answered to the patient's satisfaction    The patient agrees to comply with a standard postoperative protocol, and is willing to proceed  Education was provided via written and auditory forms  There were no barriers to learning  Written handouts regarding wound care, incision and scar care, and general preoperative information was provided to the patient  Prior to surgery, the patient may be requested to stop all anti-inflammatory medications  Prophylactic aspirin, Plavix, and Coumadin may be allowed to be continued  Medications including vitamin E , ginkgo, and fish oil are requested to be stopped approximately one week prior to surgery  Hypertensive medications and beta blockers, if taken, should be continued      Scribe Attestation    I,:   Jerald Vaughn am acting as a scribe while in the presence of the attending physician :        I,:   Davida Grossman MD personally performed the services described in this documentation    as scribed in my presence :

## 2019-10-15 LAB
ATRIAL RATE: 66 BPM
P AXIS: 29 DEGREES
PR INTERVAL: 120 MS
QRS AXIS: 15 DEGREES
QRSD INTERVAL: 86 MS
QT INTERVAL: 434 MS
QTC INTERVAL: 454 MS
T WAVE AXIS: 25 DEGREES
VENTRICULAR RATE: 66 BPM

## 2019-10-15 PROCEDURE — 93010 ELECTROCARDIOGRAM REPORT: CPT | Performed by: INTERNAL MEDICINE

## 2019-10-15 NOTE — PRE-PROCEDURE INSTRUCTIONS
Pre-Surgery Instructions:   Medication Instructions    alendronate (FOSAMAX) 70 mg tablet Instructed patient per Anesthesia Guidelines   fluticasone (FLONASE) 50 mcg/act nasal spray Instructed patient per Anesthesia Guidelines   montelukast (SINGULAIR) 10 mg tablet Instructed patient per Anesthesia Guidelines   PARoxetine (PAXIL) 10 mg tablet Instructed patient per Anesthesia Guidelines

## 2019-10-15 NOTE — H&P
CHIEF COMPLAINT:  Chief Complaint   Patient presents with    Left Wrist - Follow-up    Right Wrist - Follow-up       SUBJECTIVE:  Waldemar Loco is a 62y o  year old RHD female who presents to the office with 2 issues  1) pt would like to discuss left thumb CMC suspension plasty after she has had several CSI that have only provided her with temporary relief  Pt states that she continues to have pain and difficulty grasping and lifting  Pt states that she would like to consider a more permanent treatment  2) Pt states that she has a lump on the dorsal aspect of her right wrist  Pt states that she has some discomfort with motion of her right wrist     The patient denies any cardiac or pulmonary issues  Denies diabetes  Denies any history of MI, gastric ulcers, kidney or liver issues  Denies blood thinners  PAST MEDICAL HISTORY:  Past Medical History:   Diagnosis Date    Abnormal Pap smear of cervix     Breast cancer ([facility] [address] ) 02/06/2009    right breast    Cancer ([facility] [address] )     Right breast cancer R lymph node bx neg  Last assessed: 6/4/13    Carpal tunnel syndrome     CPAP (continuous positive airway pressure) dependence     History of radiation therapy 03/2009    Kidney stone     Last assessed: 1/7/16    Osteoporosis     Sleep apnea        PAST SURGICAL HISTORY:  Past Surgical History:   Procedure Laterality Date    BLADDER SUSPENSION      BREAST LUMPECTOMY Right 02/06/2009    malignant    CHOLECYSTECTOMY      COLONOSCOPY      LITHOTRIPSY Left     RI COLONOSCOPY FLX DX W/COLLJ SPEC WHEN PFRMD N/A 7/10/2017    Procedure: COLONOSCOPY;  Surgeon: Melonie Malave MD;  [facility];   Service: Gastroenterology    RI WRIST Fritz Bailer LIG Left 10/3/2018    Procedure: ENDOSCOPIC CARPAL TUNNEL RELEASE;  Surgeon: Debra Mohr MD;  Location: [facility];  Service: Orthopedics    SENTINEL LYMPH NODE BIOPSY         FAMILY HISTORY:  Family History   Problem Relation Age of Onset    Skin cancer Mother     Hypertension Mother     Skin cancer Father     Hypertension Father     Prostate cancer Father 79        2 bouts    Diabetes Sister         Mellitus    Breast cancer Sister 40    Uterine cancer Sister 48    Skin cancer Sister     Colon polyps Sister         Sigmoid    Cancer Family     Thyroid disease Family         Disorder    Breast cancer Maternal Aunt 40        mets    Breast cancer Cousin        SOCIAL HISTORY:  Social History     Tobacco Use    Smoking status: Never Smoker    Smokeless tobacco: Never Used   Substance Use Topics    Alcohol use: Yes     Frequency: Monthly or less     Drinks per session: 1 or 2     Comment: Rare    Drug use: No       MEDICATIONS:    Current Outpatient Medications:     alendronate (FOSAMAX) 70 mg tablet, TAKE 1 TABLET BY MOUTH ONCE A WEEK, Disp: 13 tablet, Rfl: 1    fluticasone (FLONASE) 50 mcg/act nasal spray, 1 spray into each nostril daily, Disp: 3 Bottle, Rfl: 1    montelukast (SINGULAIR) 10 mg tablet, Take 1 tablet (10 mg total) by mouth daily at bedtime, Disp: 90 tablet, Rfl: 1    PARoxetine (PAXIL) 10 mg tablet, Take 1 tablet (10 mg total) by mouth daily, Disp: 90 tablet, Rfl: 0    oxyCODONE-acetaminophen (PERCOCET) 5-325 mg per tablet, Take 1 tablet by mouth every 4 (four) hours as needed for moderate pain To be taken after surgeryMax Daily Amount: 6 tablets, Disp: 30 tablet, Rfl: 0    ALLERGIES:  No Known Allergies    REVIEW OF SYSTEMS:  Review of Systems   Constitutional: Negative for chills, fever and unexpected weight change  HENT: Negative for hearing loss, nosebleeds and sore throat  Eyes: Negative for pain, redness and visual disturbance  Respiratory: Negative for cough, shortness of breath and wheezing  Cardiovascular: Negative for chest pain, palpitations and leg swelling  Gastrointestinal: Negative for abdominal pain, nausea and vomiting  Endocrine: Negative for polydipsia and polyuria     Genitourinary: Negative for dysuria and hematuria  Skin: Negative for rash and wound  Neurological: Negative for dizziness and headaches  Psychiatric/Behavioral: Negative for decreased concentration, dysphoric mood and suicidal ideas  The patient is not nervous/anxious  VITALS:  Vitals:    10/14/19 1013   BP: 109/67   Pulse: 74       LABS:  HgA1c: No results found for: HGBA1C  BMP:   Lab Results   Component Value Date    CALCIUM 9 1 10/14/2019    K 3 5 10/14/2019    CO2 24 10/14/2019     (H) 10/14/2019    BUN 12 10/14/2019    CREATININE 0 79 10/14/2019       _____________________________________________________  PHYSICAL EXAMINATION:  General: well developed and well nourished, alert, oriented times 3 and appears comfortable  Psychiatric: Normal  HEENT: Trachea Midline, No torticollis  Cardiac:  Regular rate and rhythm  Pulmonary: No respiratory distress  Lung sounds clear to auscultation  Skin: No masses, erythema, lacerations, fluctation, ulcerations  Neurovascular: Sensation Intact to the Median, Ulnar, Radial Nerve, Motor Intact to the Median, Ulnar, Radial Nerve and Pulses Intact    MUSCULOSKELETAL EXAMINATION:  left CMC Exam:  No adduction contracture  No hyperextension deformity of MCP joint  Positive localized tenderness over radial and dorsal aspect of thumb (CMC joint)  Grind test is Positive for pain and Positive for crepitus  No triggering or tenderness over the A1 pulley  No pain with Finkelsteins maneuver     5cm x 1cm palpable mass over the [facility] of left wrist         Right wrist  Palpable cyst over right CMC  No pain with palpation of the cyst     ___________________________________________________  STUDIES REVIEWED:  Images obtained today of the right wrist  3 views demonstrate no osseous abnormality      PROCEDURES PERFORMED:  Procedures  No Procedures performed today    _____________________________________________________  ASSESSMENT/PLAN:      Right hand cyst  * Pt was advised that the cyst is likely due to cmc OA and was advised to continue to monitor at this time  * Pt was advised that surgical excision is not advised at the same time as left thumb CMC suspension-plasty     Left CMC osteoarthrosis   Left wrist mass- over [facility] - likely lipoma   Pt has failed conservative measures  [facility] suspension-plasty was discussed at length today including the risks and benefits  Pt understands and wants to proceed  *Surgery-left cmc suspension-plasty          Excision biopsy left wrist mass    * detailed consent was signed in the office   * anesthesia- regional with sedation     * antibiotics- ordered    * OT order was placed   * Post op medication was sent to the office on file    Surgery medication instructions: You will stop eating and drinking at midnight the night before your surgery, but you may continue to take your normal medications with a small sip of water  In the morning on the day of your surgery, we would like you to take the following medications (as long as you have never been told to avoid Tylenol or NSAIDs like ibuprofen, Naproxen, Aleve, Advil, etc):   Ibuprofen 600mg one tablet by mouth   Tylenol 500mg one tablet by mouth    After surgery, we would like you to take Ibuprofen 600mg one tablet by mouth every 6 hours with food (at breakfast, lunch and dinner)  AND Tylenol 500 mg one tablet by mouth every 6 hours  (at breakfast, lunch and dinner) for 5-7 days after your surgery  Please take these medication EVERYDAY after surgery for 5-7 days, and not just as needed  You can take these medications at the same time  Taking these medications after surgery will limit your need for prescription pain medication  We will also prescribe a narcotic pain medication for a limited time after surgery that you can take as needed for moderate or severe pain               Diagnoses and all orders for this visit:    Primary osteoarthritis of first carpometacarpal joint of left hand  - Ambulatory referral to PT/OT hand therapy  -     CBC and differential; Future  -     Basic metabolic panel; Future  -     ECG 12 lead; Future  -     Case request operating room: 1) LEFT THUMB SUSPENSIONPLASTY  2) left thumb lipoma excision; Standing  -     oxyCODONE-acetaminophen (PERCOCET) 5-325 mg per tablet; Take 1 tablet by mouth every 4 (four) hours as needed for moderate pain To be taken after surgeryMax Daily Amount: 6 tablets  -     Case request operating room: 1) LEFT THUMB SUSPENSIONPLASTY  2) left thumb lipoma excision    Right wrist pain  -     XR wrist 3+ vw right; Future    Pre-operative laboratory examination  -     CBC and differential; Future  -     Basic metabolic panel; Future  -     ECG 12 lead; Future    Mass of both wrists    Other orders  -     Diet NPO; Sips with meds; Standing  -     Height and weight upon arrival; Standing  -     Void on call to OR; Standing  -     Insert peripheral IV; Standing  -     ceFAZolin (ANCEF) IVPB (premix) 1,000 mg        Follow Up:  Return for after surgery  Work/school status:  Provided   To Do Next Visit:  Re-evaluation of current issue      Operative Discussions:  Thumb CMC suspensionplasty:   The anatomy and physiology of carpometacarpal joint arthritis was discussed with the patient today in the office  Deterioration of the articular cartilage eventually leads to hypermobility at the thumb [facility] joint, resulting in joint subluxation, osteophyte formation, cystic changes within the trapezium and base of the first metacarpal, as well as subchondral sclerosis  Eventually, pain, limited mobility, and compensatory hyperextension at the metacarpophalangeal joint may develop  While normal activity and usage of the thumb joint may provide a painful experience to the patient, this typically does not result in damage to the thumb or hand  Treatment options include resting thumb spica splints to decreased joint edema, pain, and inflammation    Therapy exercises to strengthen the thenar musculature may relieve pain, but do not alter the overall continued development of osteoarthritis  Oral medications, topical medications, corticosteroid injections may decrease pain and increase overall function  Eventually, approximately 5% of patients may require surgical intervention  The patient has elected to undergo thumb reconstruction (suspensionplasty)  The operation was explained to the patient and the patient expressed verbal understanding  The risks and benefit of surgery were discussed with the patient  After the procedure, the pain will be decreased, the function improved, and the strength improved  The potential for numbness around the incision site was also discussed  The patient is aware that rehabilitation is a 3 month process divided into 3 stages  The first 4weeks consists of rest to the surgical site - the bulky dressing applied after surgery will be replaced by a removable splint for 4 weeks  This removable splint may be removed for showering, bathing, and daily therapy exercises  At 4 weeks postoperatively, the splint will be discontinued and thumb motion exercises will continue for the next 4 weeks  The following 4 weeks the patient will begin strengthening exercises  The patient is aware that the average total recovery time is approximately 3-4 months  Success rate is approximately 90-95%  The risks and benefits of the procedure were explained to the patient, which include, but are not limited to: Bleeding, infection, recurrence, pain, scar, damage to tendons, damage to nerves, and damage to blood vessels, failure to give desired results and complications related to anesthesia  These risks, along with alternative conservative treatment options, and postoperative protocols were voiced back and understood by the patient  All questions were answered to the patient's satisfaction    The patient agrees to comply with a standard postoperative protocol, and is willing to proceed  Education was provided via written and auditory forms  There were no barriers to learning  Written handouts regarding wound care, incision and scar care, and general preoperative information was provided to the patient  Prior to surgery, the patient may be requested to stop all anti-inflammatory medications  Prophylactic aspirin, Plavix, and Coumadin may be allowed to be continued  Medications including vitamin E , ginkgo, and fish oil are requested to be stopped approximately one week prior to surgery  Hypertensive medications and beta blockers, if taken, should be continued      Scribe Attestation    I,:   Jerald Vaughn am acting as a scribe while in the presence of the attending physician :        I,:   Davida Grossman MD personally performed the services described in this documentation    as scribed in my presence :

## 2019-10-22 ENCOUNTER — ANESTHESIA EVENT (OUTPATIENT)
Dept: PERIOP | Facility: HOSPITAL | Age: 59
End: 2019-10-22
Payer: COMMERCIAL

## 2019-10-22 NOTE — ANESTHESIA PREPROCEDURE EVALUATION
Review of Systems/Medical History  Patient summary reviewed  Chart reviewed  No history of anesthetic complications     Cardiovascular  EKG reviewed, Exercise tolerance (METS): >4,  No Hyperlipidemia, No CAD , No angina , No orthopnea,   Comment: Prinzmetal's angina ,  Pulmonary  Sleep apnea CPAP,        GI/Hepatic  Negative GI/hepatic ROS          Kidney stones,        Endo/Other  Negative endo/other ROS      GYN  Negative gynecology ROS          Hematology  Negative hematology ROS      Musculoskeletal    Arthritis     Neurology  Negative neurology ROS      Psychology   Depression ,              Physical Exam    Airway    Mallampati score: I  TM Distance: >3 FB  Neck ROM: full     Dental   Comment: Missing and several caps,     Cardiovascular  Cardiovascular exam normal    Pulmonary  Pulmonary exam normal     Other Findings      Lab Results   Component Value Date    WBC 6 52 10/14/2019    HGB 13 2 10/14/2019     10/14/2019     Lab Results   Component Value Date    K 3 5 10/14/2019    BUN 12 10/14/2019    CREATININE 0 79 10/14/2019     No results found for: PTT   No results found for: INR    No results found for: HGBA1C    Current Outpatient Medications on File Prior to Visit   Medication Sig    alendronate (FOSAMAX) 70 mg tablet TAKE 1 TABLET BY MOUTH ONCE A WEEK    fluticasone (FLONASE) 50 mcg/act nasal spray 1 spray into each nostril daily    montelukast (SINGULAIR) 10 mg tablet Take 1 tablet (10 mg total) by mouth daily at bedtime    oxyCODONE-acetaminophen (PERCOCET) 5-325 mg per tablet Take 1 tablet by mouth every 4 (four) hours as needed for moderate pain To be taken after surgeryMax Daily Amount: 6 tablets    PARoxetine (PAXIL) 10 mg tablet Take 1 tablet (10 mg total) by mouth daily     No current facility-administered medications on file prior to visit  Anesthesia Plan  ASA Score- 2     Anesthesia Type- IV sedation with anesthesia and regional with ASA Monitors     Additional Monitors:   Airway Plan:         Plan Factors-  Patient did not smoke on day of surgery  Induction- intravenous  Postoperative Plan-     Informed Consent- Anesthetic plan and risks discussed with patient  I personally reviewed this patient with the CRNA  Discussed and agreed on the Anesthesia Plan with the CRNA  [facility]

## 2019-10-23 ENCOUNTER — ANESTHESIA (OUTPATIENT)
Dept: PERIOP | Facility: HOSPITAL | Age: 59
End: 2019-10-23
Payer: COMMERCIAL

## 2019-10-23 ENCOUNTER — APPOINTMENT (OUTPATIENT)
Dept: RADIOLOGY | Facility: HOSPITAL | Age: 59
End: 2019-10-23
Payer: COMMERCIAL

## 2019-10-23 ENCOUNTER — HOSPITAL ENCOUNTER (OUTPATIENT)
Facility: HOSPITAL | Age: 59
Setting detail: OUTPATIENT SURGERY
Discharge: HOME | End: 2019-10-23
Attending: ORTHOPAEDIC SURGERY | Admitting: ORTHOPAEDIC SURGERY
Payer: COMMERCIAL

## 2019-10-23 VITALS
BODY MASS INDEX: 29.7 KG/M2 | WEIGHT: 173.94 LBS | HEART RATE: 81 BPM | DIASTOLIC BLOOD PRESSURE: 81 MMHG | SYSTOLIC BLOOD PRESSURE: 159 MMHG | OXYGEN SATURATION: 93 % | RESPIRATION RATE: 22 BRPM | TEMPERATURE: 98 F | HEIGHT: 64 IN

## 2019-10-23 DIAGNOSIS — M18.12 PRIMARY OSTEOARTHRITIS OF FIRST CARPOMETACARPAL JOINT OF LEFT HAND: ICD-10-CM

## 2019-10-23 PROCEDURE — 88304 TISSUE EXAM BY PATHOLOGIST: CPT | Performed by: PATHOLOGY

## 2019-10-23 PROCEDURE — C1713 ANCHOR/SCREW BN/BN,TIS/BN: HCPCS | Performed by: ORTHOPAEDIC SURGERY

## 2019-10-23 PROCEDURE — 73140 X-RAY EXAM OF FINGER(S): CPT

## 2019-10-23 PROCEDURE — 25447 ARTHRP NTRCRP/CRP/MTCR NTRPS: CPT | Performed by: PHYSICIAN ASSISTANT

## 2019-10-23 PROCEDURE — 25447 ARTHRP NTRCRP/CRP/MTCR NTRPS: CPT | Performed by: ORTHOPAEDIC SURGERY

## 2019-10-23 DEVICE — TRUSHOT™ WITH Y-KNOT® SHALLOW ALL-SUTURE ANCHOR (1.7 MM) WITH ONE #0 (3.5 METRIC) HI-FI® SUTURE WITH NEEDLES
Type: IMPLANTABLE DEVICE | Status: FUNCTIONAL
Brand: TRUSHOT

## 2019-10-23 RX ORDER — PROPOFOL 10 MG/ML
INJECTION, EMULSION INTRAVENOUS AS NEEDED
Status: DISCONTINUED | OUTPATIENT
Start: 2019-10-23 | End: 2019-10-23 | Stop reason: SURG

## 2019-10-23 RX ORDER — LIDOCAINE HYDROCHLORIDE 10 MG/ML
INJECTION, SOLUTION EPIDURAL; INFILTRATION; INTRACAUDAL; PERINEURAL AS NEEDED
Status: DISCONTINUED | OUTPATIENT
Start: 2019-10-23 | End: 2019-10-23 | Stop reason: SURG

## 2019-10-23 RX ORDER — FENTANYL CITRATE 50 UG/ML
INJECTION, SOLUTION INTRAMUSCULAR; INTRAVENOUS AS NEEDED
Status: DISCONTINUED | OUTPATIENT
Start: 2019-10-23 | End: 2019-10-23 | Stop reason: SURG

## 2019-10-23 RX ORDER — TRAMADOL HYDROCHLORIDE 50 MG/1
50 TABLET ORAL EVERY 6 HOURS PRN
Status: DISCONTINUED | OUTPATIENT
Start: 2019-10-23 | End: 2019-10-23 | Stop reason: HOSPADM

## 2019-10-23 RX ORDER — GLYCOPYRROLATE 0.2 MG/ML
INJECTION INTRAMUSCULAR; INTRAVENOUS AS NEEDED
Status: DISCONTINUED | OUTPATIENT
Start: 2019-10-23 | End: 2019-10-23 | Stop reason: SURG

## 2019-10-23 RX ORDER — MIDAZOLAM HYDROCHLORIDE 1 MG/ML
INJECTION INTRAMUSCULAR; INTRAVENOUS AS NEEDED
Status: DISCONTINUED | OUTPATIENT
Start: 2019-10-23 | End: 2019-10-23 | Stop reason: SURG

## 2019-10-23 RX ORDER — MAGNESIUM HYDROXIDE 1200 MG/15ML
LIQUID ORAL AS NEEDED
Status: DISCONTINUED | OUTPATIENT
Start: 2019-10-23 | End: 2019-10-23 | Stop reason: HOSPADM

## 2019-10-23 RX ORDER — PROPOFOL 10 MG/ML
INJECTION, EMULSION INTRAVENOUS CONTINUOUS PRN
Status: DISCONTINUED | OUTPATIENT
Start: 2019-10-23 | End: 2019-10-23 | Stop reason: SURG

## 2019-10-23 RX ORDER — KETAMINE HYDROCHLORIDE 50 MG/ML
INJECTION, SOLUTION, CONCENTRATE INTRAMUSCULAR; INTRAVENOUS AS NEEDED
Status: DISCONTINUED | OUTPATIENT
Start: 2019-10-23 | End: 2019-10-23 | Stop reason: SURG

## 2019-10-23 RX ORDER — ONDANSETRON 2 MG/ML
INJECTION INTRAMUSCULAR; INTRAVENOUS AS NEEDED
Status: DISCONTINUED | OUTPATIENT
Start: 2019-10-23 | End: 2019-10-23 | Stop reason: SURG

## 2019-10-23 RX ORDER — SODIUM CHLORIDE, SODIUM LACTATE, POTASSIUM CHLORIDE, CALCIUM CHLORIDE 600; 310; 30; 20 MG/100ML; MG/100ML; MG/100ML; MG/100ML
100 INJECTION, SOLUTION INTRAVENOUS CONTINUOUS
Status: DISCONTINUED | OUTPATIENT
Start: 2019-10-23 | End: 2019-10-23 | Stop reason: HOSPADM

## 2019-10-23 RX ORDER — ONDANSETRON 2 MG/ML
4 INJECTION INTRAMUSCULAR; INTRAVENOUS EVERY 6 HOURS PRN
Status: DISCONTINUED | OUTPATIENT
Start: 2019-10-23 | End: 2019-10-23 | Stop reason: HOSPADM

## 2019-10-23 RX ORDER — ACETAMINOPHEN 325 MG/1
650 TABLET ORAL EVERY 6 HOURS PRN
Status: DISCONTINUED | OUTPATIENT
Start: 2019-10-23 | End: 2019-10-23 | Stop reason: HOSPADM

## 2019-10-23 RX ORDER — FENTANYL CITRATE/PF 50 MCG/ML
25 SYRINGE (ML) INJECTION
Status: DISCONTINUED | OUTPATIENT
Start: 2019-10-23 | End: 2019-10-23 | Stop reason: HOSPADM

## 2019-10-23 RX ORDER — CEFAZOLIN SODIUM 1 G/50ML
1000 SOLUTION INTRAVENOUS ONCE
Status: COMPLETED | OUTPATIENT
Start: 2019-10-23 | End: 2019-10-23

## 2019-10-23 RX ORDER — HYDROMORPHONE HCL/PF 1 MG/ML
0.5 SYRINGE (ML) INJECTION
Status: DISCONTINUED | OUTPATIENT
Start: 2019-10-23 | End: 2019-10-23 | Stop reason: HOSPADM

## 2019-10-23 RX ORDER — SODIUM CHLORIDE, SODIUM LACTATE, POTASSIUM CHLORIDE, CALCIUM CHLORIDE 600; 310; 30; 20 MG/100ML; MG/100ML; MG/100ML; MG/100ML
125 INJECTION, SOLUTION INTRAVENOUS CONTINUOUS
Status: DISCONTINUED | OUTPATIENT
Start: 2019-10-23 | End: 2019-10-23 | Stop reason: HOSPADM

## 2019-10-23 RX ORDER — ONDANSETRON 2 MG/ML
4 INJECTION INTRAMUSCULAR; INTRAVENOUS ONCE AS NEEDED
Status: DISCONTINUED | OUTPATIENT
Start: 2019-10-23 | End: 2019-10-23 | Stop reason: HOSPADM

## 2019-10-23 RX ORDER — DEXAMETHASONE SODIUM PHOSPHATE 4 MG/ML
INJECTION, SOLUTION INTRA-ARTICULAR; INTRALESIONAL; INTRAMUSCULAR; INTRAVENOUS; SOFT TISSUE AS NEEDED
Status: DISCONTINUED | OUTPATIENT
Start: 2019-10-23 | End: 2019-10-23 | Stop reason: SURG

## 2019-10-23 RX ADMIN — FENTANYL CITRATE 50 MCG: 50 INJECTION, SOLUTION INTRAMUSCULAR; INTRAVENOUS at 10:40

## 2019-10-23 RX ADMIN — KETAMINE HYDROCHLORIDE 20 MG: 50 INJECTION INTRAMUSCULAR; INTRAVENOUS at 11:55

## 2019-10-23 RX ADMIN — DEXAMETHASONE SODIUM PHOSPHATE 4 MG: 4 INJECTION, SOLUTION INTRAMUSCULAR; INTRAVENOUS at 11:58

## 2019-10-23 RX ADMIN — LIDOCAINE HYDROCHLORIDE 50 MG: 10 INJECTION, SOLUTION EPIDURAL; INFILTRATION; INTRACAUDAL; PERINEURAL at 11:55

## 2019-10-23 RX ADMIN — ONDANSETRON 4 MG: 2 INJECTION INTRAMUSCULAR; INTRAVENOUS at 11:58

## 2019-10-23 RX ADMIN — PROPOFOL 100 MCG/KG/MIN: 10 INJECTION, EMULSION INTRAVENOUS at 11:57

## 2019-10-23 RX ADMIN — SODIUM CHLORIDE, SODIUM LACTATE, POTASSIUM CHLORIDE, AND CALCIUM CHLORIDE 125 ML/HR: .6; .31; .03; .02 INJECTION, SOLUTION INTRAVENOUS at 10:57

## 2019-10-23 RX ADMIN — PROPOFOL 20 MG: 10 INJECTION, EMULSION INTRAVENOUS at 12:15

## 2019-10-23 RX ADMIN — MIDAZOLAM HYDROCHLORIDE 2 MG: 1 INJECTION, SOLUTION INTRAMUSCULAR; INTRAVENOUS at 10:40

## 2019-10-23 RX ADMIN — GLYCOPYRROLATE 0.1 MG: 0.2 INJECTION, SOLUTION INTRAMUSCULAR; INTRAVENOUS at 11:53

## 2019-10-23 RX ADMIN — CEFAZOLIN SODIUM 1000 MG: 1 SOLUTION INTRAVENOUS at 11:50

## 2019-10-23 RX ADMIN — PROPOFOL 80 MG: 10 INJECTION, EMULSION INTRAVENOUS at 11:55

## 2019-10-23 NOTE — DISCHARGE INSTRUCTIONS
Post Operative Instructions    You have had surgery on your arm today, please read and follow the information below:  · Elevate your hand above your elbow during the next 24-48 hours to help with swelling  · Place your hand and arm over your head with motion at your shoulder three times a day  · Do not apply any cream/ointment/oil to your incisions including antibiotics  · Do not soak your hands in standing water (dishwater, tubs, Jacuzzi's, pools, etc ) until given permission (typically 2-3 weeks after injury)    Call the office at [phone]  if you notice any:  · Increased numbness or tingling of your hand or fingers that is not relieved with elevation  · Increasing pain that is not controlled with medication  · Difficulty chewing, breathing, swallowing  · Chest pains or shortness of breath  · Fever over 101 4 degrees  Bandage: Your therapist will remove your bandage at your first therapy appointment  Motion: Move fingers into a fist 5 times a day, DO NOT move any splinted fingers  Weight bearing status: The operated extremity should be non-weight bearing until further notice  Ice: Ice for 10 minutes every hour as needed for swelling x 24 hours  Sling: No sling necessary  Pain medication: A prescription for pain medication was provided in the office and sent to your pharmacy  Your prescription pain medication also contains Tylenol  Please limit total Tylenol dose to under 3,000 mg a day  After surgery, we would like you to take Ibuprofen 600mg one tablet by mouth every 6 hours with food (at breakfast, lunch and dinner)  AND Tylenol 500 mg one tablet by mouth every 6 hours  (at breakfast, lunch and dinner) for 5-7 days after your surgery  Please take these medication EVERYDAY after surgery for 5-7 days, and not just as needed  You can take these medications at the same time  Taking these medications after surgery will limit your need for prescription pain medication        If the pain becomes severe, and the pain medication is not alleviating symptoms, The greatest source of pain after surgery is usually a tight dressing due to increased swelling after surgery  If the pain becomes severe after surgery, and the patient medication is NOT alleviating the symptoms, the patient should do the following: The patient should  loosen the top dressing (usually coban or an ace bandage) and loosen/cut the rolled gauze beneath  The 4x4 gauze that is directly covering the incision should remain in place  The splint (if the patient has one) should remain in place  The ace bandage/coban can then be replaced on top in a less constrictive manor  If this does not help relieve the pain/numbness in a few hours, the patient should call our office (number listed below)  and we can have them seen in the office for further evaluation  Follow-up Appointment: 7-10 days with Dr Donna Huston  Occupational Therapy: 10/25/2019  AFTER THE FIRST THERAPY APPOINTMENT, the patient may remove the splint/dressing for showering and clean the incision with soap and water  Keep incision dry after washing  Do not expose the incision to dirty water (oceans, pools, hot tubs, etc)     If you need help scheduling Therapy, you can call [phone]        Please call the office at [phone] if you have any questions or concerns regarding your post-operative care

## 2019-10-23 NOTE — INTERVAL H&P NOTE
H&P reviewed  After examining the patient I find no changes in the patients condition since the H&P had been written      Vitals:    10/23/19 1035   BP: 133/83   Pulse: 79   Resp: 21   Temp: 98 5 °F (36 9 °C)   SpO2: 99%

## 2019-10-23 NOTE — ANESTHESIA POSTPROCEDURE EVALUATION
Post-Op Assessment Note    CV Status:  Stable  Pain Score: 0    Pain management: adequate     Mental Status:  Awake   Hydration Status:  Stable   PONV Controlled:  None   Airway Patency:  Patent and adequate   Post Op Vitals Reviewed: Yes      Staff: CRNA           /76 (10/23/19 1253)    Temp 97 9 °F (36 6 °C) (10/23/19 1253)    Pulse 85 (10/23/19 1253)   Resp 13 (10/23/19 1253)    SpO2 95 % (10/23/19 1253)

## 2019-10-23 NOTE — OP NOTE
OPERATIVE REPORT    ATIENT NAME: Washington Ernandez    MEDICAL RECORD NO:  [id number]    PROCEDURE DATE:  10/23/19    :  1960    SURGEON:  LEANN Quan , Ph D     Nishant Parcel:  Andrew Vasquez    No qualified resident was available to assist for this surgery   A Physician Assistant was used to aid in reduction and retraction while the orthopedic hardware was placed      PREOPERATIVE DIAGNOSIS:  left thumb CMC osteoarthritis     POSTOPERATIVE DIAGNOSIS: left thumb CMC osteoarthritis     PROCEDURE PERFORMED: left thumb trapeziectomy and suspensionplasty utilizing TruShot Anchors    ANESTHESIA:  Regional and conscious sedation    COMPLICATIONS: None    TOURNIQUET TIME:  36 minutes at 250 mmHg     DISPOSITION: Patient was sent to the PACU in stable condition  INDICATIONS: Patient is a 62 y o  female with left basal joint osteoarthritis  The patient has failed conservative management including injections, splinting and anti-inflammatories  Surgical intervention was offered  Risks and benefits of trapeziectomy with ligament reconstruction and FCR tendon interposition were explained  Patient understood and wished to proceed  PROCEDURE:  The patient was identified in the preoperative screening area  Consent was signed and verified after identifying the correct operative site  The patient was taken back to the operating room after receiving regional anesthesia in the pre-operative screening area  The patient then underwent general anesthesia  The patients left upper extremity was then prepped and draped in normal sterile fashion with chlorhexidine solution  The arm was then elevated and exsanguinated with an Esmarch and tourniquet placed about the brachium was insufflated to 250 mmHg  The Esmarch was removed  A 3 cm incision was made over the dorsum of the thumb centered over the trapezium  The incision was made sharply through the skin   Blunt dissection was taken down to the level of the fascia  The cutaneous nerves were identified and retracted out of harms way during the procedure  The interval between the EPB and APL tendons was identified  The first dorsal compartment sheath was incised from distal to proximal for a first dorsal compartment release  Following this the capsule and periosteum surrounding the first carpometacarpal joint were incised  The [facility] joint was identified and the surfaces evaluated  Eburnation of the articular surfaces involved 6)% of the metacarpal base and 7) % of the trapezium  The decision was made to proceed with suspensionplasty  The trapezium was then excised  The STT joint was evaluated and found to be well preserved  The FCR tendon was identified in the depths of the wound and demonstrated no stranding  The dorsoradial base of the thumb metacarpal and the base of the 2nd metacarpal were well visualized and prepped for anchor placement  TruShot 0-braided suture anchors were utilized  Two anchors were placed into the base of the index metacarpal using the included device  Each anchor was placed, one dorsally and one volarly  as much as possible to create a wide suspension  Two drill-holes were placed into the base of the thumb metacarpal with the with a common entrance at the ulnar articular base and diverging at the radial cortical margins  as much as allowable  The suture ends were then brought through the bone tunnels and tensioned such that maximal adduction of the thumb was achieved and then tied over the radial cortical bridge of the thumb metacarpal   Intra-operative imaging confirmed the suspension of the thumb metacarpal which was in good position relative to the second metacarpal base  Axial load testing revealed the suspension successfully holding the thumb MC with minimal subsidence toward the scaphoid  The capsule and periosteal sleeve were then closed using multiple 3-0 Ethibond suture in an interrupted figure-8 fashion  The wound was irrigated prior to closure  The skin incisions were closed with nylon suture in an interrupted horizontal mattress fashion  The patient tolerated the procedure well  There were no complications during the case  The wound was dressed in a sterile dressing and placed in thumb spica splint  The tourniquet was released at 36 minutes with good capillary refill and color noted distally  The patient tolerated the procedure well, was awakened in the operating room, and sent to the PACU in stable condition  As no first assistant was provided by the hospital, it was elected to use a physician's assistant to stabilize the extremity and aid in instrumentation            Sarita Marcus MD 10/23/19

## 2019-10-25 ENCOUNTER — EVALUATION (OUTPATIENT)
Dept: OCCUPATIONAL THERAPY | Facility: MEDICAL CENTER | Age: 59
End: 2019-10-25
Payer: COMMERCIAL

## 2019-10-25 DIAGNOSIS — M18.12 PRIMARY OSTEOARTHRITIS OF FIRST CARPOMETACARPAL JOINT OF LEFT HAND: ICD-10-CM

## 2019-10-25 DIAGNOSIS — Z47.89 AFTERCARE FOLLOWING SURGERY OF THE MUSCULOSKELETAL SYSTEM: Primary | ICD-10-CM

## 2019-10-25 PROCEDURE — L3808 WHFO, RIGID W/O JOINTS: HCPCS

## 2019-10-25 PROCEDURE — 97165 OT EVAL LOW COMPLEX 30 MIN: CPT

## 2019-10-25 NOTE — PROGRESS NOTES
Orthosis    Diagnosis:   1  Aftercare following surgery of the musculoskeletal system       Indication: Motion Blocking and Suspensionplasty    Location: Left  wrist, hand and thumb  Supplies: Custom Fit Orthotic  Orthosis type: Thumb SPICA forearm-based  Wearing Schedule: Remove for hygiene only  Describe Position: Thumb in neutral RA/PA    Precautions: Suspensionplasty    Patient or Caregiver expresses understanding of wearing Schedule and Precautions? Yes  Patient or Caregiver able to don/doff orthotic independently? Yes    Written orders provided to patient?  Yes  Orders Obtained: Written  Orders Obtained from: Debbie Agarwal    Return for evaluation and treatment Yes in 4 weeks (11/21)

## 2019-10-25 NOTE — PROGRESS NOTES
OT Evaluation     Today's date: 10/25/2019  Patient name: Bailey Newton  : 1960  MRN: [id number]  Referring provider: Mosie Blizzard, MD  Dx:   Encounter Diagnosis     ICD-10-CM    1  Aftercare following surgery of the musculoskeletal system Z47 89                   Assessment  Assessment details: Jones Christopher presents in her post-op dressing  Her sutures are intact with no s/s of infection  She was fabricated a [facility] orthotic to be worn at all time except bathing  She was given orthotic instructions and verbalized understanding  She was given tendon gliding exercises to be done 10x every waking hour  She was made aware of the s/s of infection and precautions of her surgery and verbalized understanding  She will return in 4 weeks for a re-evaluation and to begin formal therapy  Impairments: abnormal or restricted ROM, activity intolerance, impaired physical strength, lacks appropriate home exercise program and pain with function    Goals  STG 1: Compliant with splint 100% of the time  STG 2: Decrease overall pain to 4/10 in 4-6 weeks  STG 4: Compliant with HEP in 2 weeks  LTG 1: Complete all ADL/IADLs improved to prior level of function within 6-8 weeks  LTG 2: Leisure/social skills improved within 6-8 weeks  LTG 3: Work skills improved to prior level of function within 6-8 weeks    Patient Goal: To be able to use my hand    Goals, plan of care and treatment condition discussed with patient  Patient expresses their understanding and questions regarding these isues were addressed        Plan  Plan details: Post op day 3: Wound check, fabricate WHFO to be worn AAT besides bathing  Week 4-8: AAROM if MP, IP, and wrist, scar mgmt, opposition to IF and LF, thumb ext, progress to full opposition, isometric strengthening, wean splint at 6-8 weeks post-op, PROM of wrist if stiffness continues  Week 8-12: Begin strengthening, gentle  and pinch strengthening  Patient would benefit from: custom splinting, OT eval and skilled occupational therapy  Planned modality interventions: thermotherapy: paraffin bath, thermotherapy: hydrocollator packs, fluidotherapy, cryotherapy and ultrasound  Planned therapy interventions: manual therapy, Castaneda taping, motor coordination training, orthotic fitting/training, home exercise program, graded activity, functional ROM exercises, graded exercise, fine motor coordination training, therapeutic activities and therapeutic exercise  Frequency: 2x week  Duration in weeks: 8  Plan of Care beginning date: 10/25/2019  Plan of Care expiration date: 2019  Treatment plan discussed with: patient        Subjective Evaluation    History of Present Illness  Onset date: a year    Date of surgery: 10/23/2019  Mechanism of injury: She stated she has been having thumb pain for over a year during most activities, she had injections which she stated did not provide her relief and elected for a thumb suspensionplasty  Pain  Current pain ratin  At best pain ratin  At worst pain ratin  Quality: throbbing          Objective     Active Range of Motion     Additional Active Range of Motion Details  Not tested per protocol  Not tested per protocol    Strength/Myotome Testing     Additional Strength Details  Not tested per protcol             Precautions: Thumb Suspensionplasty (10/23)  Post op day 3: Wound check, fabricate WHFO to be worn AAT besides bathing  Week 4-8: AAROM if MP, IP, and wrist, scar mgmt, opposition to IF and LF, thumb ext, progress to full opposition, isometric strengthening, wean splint at 6-8 weeks post-op, PROM of wrist if stiffness continues  Week 8-12: Begin strengthening, gentle  and pinch strengthening

## 2019-10-31 ENCOUNTER — APPOINTMENT (OUTPATIENT)
Dept: RADIOLOGY | Facility: CLINIC | Age: 59
End: 2019-10-31
Payer: COMMERCIAL

## 2019-10-31 ENCOUNTER — OFFICE VISIT (OUTPATIENT)
Dept: OBGYN CLINIC | Facility: CLINIC | Age: 59
End: 2019-10-31

## 2019-10-31 VITALS
BODY MASS INDEX: 29.55 KG/M2 | SYSTOLIC BLOOD PRESSURE: 121 MMHG | DIASTOLIC BLOOD PRESSURE: 83 MMHG | HEIGHT: 64 IN | HEART RATE: 91 BPM | WEIGHT: 173.1 LBS

## 2019-10-31 DIAGNOSIS — Z48.89 AFTERCARE FOLLOWING SURGERY: Primary | ICD-10-CM

## 2019-10-31 DIAGNOSIS — Z48.89 AFTERCARE FOLLOWING SURGERY: ICD-10-CM

## 2019-10-31 PROCEDURE — 99024 POSTOP FOLLOW-UP VISIT: CPT | Performed by: ORTHOPAEDIC SURGERY

## 2019-10-31 PROCEDURE — 73110 X-RAY EXAM OF WRIST: CPT

## 2019-10-31 NOTE — PROGRESS NOTES
SUBJECTIVE:  Gonzalo Wright is a 62y o  year old female who presents for follow up after surgery left thumb suspension-plasty,  performed on  10/23/2019  Today patient states that  She has more her splint at all times  Patient has no complaints of pain  VITALS:  Vitals:    10/31/19 0906   BP: 121/83   Pulse: 91       PHYSICAL EXAMINATION:  General: well developed and well nourished, alert, oriented times 3 and appears comfortable  Psychiatric: Normal    MUSCULOSKELETAL EXAMINATION:  Left thumb   Incision: Clean, dry, with sutures intact  Range of Motion: as expected   Neurovascular status: Neuro intact, good cap refill      STUDIES REVIEWED:  Images obtained today of the left thumb 3   views demonstrate well-maintained trapezial void with thumb MC well suspended  PROCEDURES PERFORMED:  Procedures  No Procedures performed today      ASSESSMENT/PLAN:    S/P left CMC suspension-plasty  * sutures were removed without complications   * Pt was advised to continue splint, may begin to ween out in about 3 weeks   * Pt will  Begin therapy in 3 weeks as per protocol  * Pt will follow up in the office in 6 weeks     FOLLOW UP:  Return in about 6 weeks (around 12/12/2019)        TO DO AT NEXT VISIT:  Re-evaluation of current issue      Scribe Attestation    I,:   Merlyn Rios am acting as a scribe while in the presence of the attending physician :        I,:   Margot Nathan MD personally performed the services described in this documentation    as scribed in my presence :

## 2019-11-21 ENCOUNTER — EVALUATION (OUTPATIENT)
Dept: OCCUPATIONAL THERAPY | Facility: MEDICAL CENTER | Age: 59
End: 2019-11-21
Payer: COMMERCIAL

## 2019-11-21 DIAGNOSIS — Z47.89 AFTERCARE FOLLOWING SURGERY OF THE MUSCULOSKELETAL SYSTEM: Primary | ICD-10-CM

## 2019-11-21 PROCEDURE — 97530 THERAPEUTIC ACTIVITIES: CPT

## 2019-11-21 PROCEDURE — 97140 MANUAL THERAPY 1/> REGIONS: CPT

## 2019-11-21 PROCEDURE — 97110 THERAPEUTIC EXERCISES: CPT

## 2019-11-21 NOTE — PROGRESS NOTES
Daily Note     Today's date: 2019  Patient name: Penny Mcgill  : 1960  MRN: [id number]  Referring provider: Nuvia Wiggins MD  Dx:   Encounter Diagnosis     ICD-10-CM    1  Aftercare following surgery of the musculoskeletal system Z47 89                   Subjective: It feels pretty good      Objective: See treatment diary below      Assessment: Tolerated treatment well   Patient would benefit from continued OT See RE      Plan: Continue Per Precautions     Precautions: Thumb Suspensionplasty (10/23)  Post op day 3: Wound check, fabricate WHFO to be worn AAT besides bathing  Week 4-8: AROM if MP, IP, and wrist, scar mgmt, opposition to IF and LF, thumb ext, progress to full opposition, isometric strengthening, wean splint at 6-8 weeks post-op, PROM of wrist if stiffness continues  Week 8-12: Begin strengthening, gentle  and pinch strengthening          Manual              Scar Management 8'                                                                    Exercise Diary              Opposition Marbles 25x            Tennis ball opposition 2 min CW and CCW            Translation marbles 25x            Wrist maze 10x                                                                                                                                                                                                               HEP thumb and wrist AROM                Modalities              MHP 5'

## 2019-11-21 NOTE — PROGRESS NOTES
OT Re-Evaluation     Today's date: 2019  Patient name: Milagro Mendoza  : 1960  MRN: [id number]  Referring provider: Lay Curry MD  Dx:   Encounter Diagnosis     ICD-10-CM    1  Aftercare following surgery of the musculoskeletal system Z47 89                   Assessment  Assessment details: Katerina Jewell presents in her post-op dressing  Her sutures are intact with no s/s of infection  She was fabricated a [facility] orthotic to be worn at all time except bathing  She was given orthotic instructions and verbalized understanding  She was given tendon gliding exercises to be done 10x every waking hour  She was made aware of the s/s of infection and precautions of her surgery and verbalized understanding  She will return in 4 weeks for a re-evaluation and to begin formal therapy  : Katerina Jewell presents to begin formal therapy 4 weeks post-op thumb suspensionplasty  She has WFL wrist ROM, decreased thumb ROM, increased pain with function and edema  She has been compliant with her splint and digit ROM HEP  Instructed in thumb and wrist AROM HEP today, she verbalized understanding  She would benefit from continued OT to increase her ROM, strength (when cleared), decrease her edema, and allow her to meet her goals and return to ADL[facility]IADLs at [facility]  Impairments: abnormal or restricted ROM, activity intolerance, impaired physical strength, lacks appropriate home exercise program and pain with function    Goals  STG 1: Compliant with splint 100% of the time MET  STG 2: Decrease overall pain to 4/10 in 4-6 weeks  PROGRESSING  STG 4: Compliant with HEP in 2 weeks  MET    LTG 1: Complete all ADL/IADLs improved to prior level of function within 6-8 weeks  LTG 2: Leisure/social skills improved within 6-8 weeks  LTG 3: Work skills improved to prior level of function within 6-8 weeks    Patient Goal: To be able to use my hand    Goals, plan of care and treatment condition discussed with patient   Patient expresses their understanding and questions regarding these isues were addressed  New Goals :     STG 1: Increase thumb AROM 25% in 4-6 weeks  STG 2: Increase overall strength by 25% in 6 weeks  Plan  Plan details: Post op day 3: Wound check, fabricate WHFO to be worn AAT besides bathing  Week 4-8: AAROM if MP, IP, and wrist, scar mgmt, opposition to IF and LF, thumb ext, progress to full opposition, isometric strengthening, wean splint at 6-8 weeks post-op, PROM of wrist if stiffness continues  Week 8-12: Begin strengthening, gentle  and pinch strengthening  Patient would benefit from: custom splinting, OT eval and skilled occupational therapy  Planned modality interventions: thermotherapy: paraffin bath, thermotherapy: hydrocollator packs, fluidotherapy, cryotherapy and ultrasound  Planned therapy interventions: manual therapy, Castaneda taping, motor coordination training, orthotic fitting/training, home exercise program, graded activity, functional ROM exercises, graded exercise, fine motor coordination training, therapeutic activities and therapeutic exercise  Frequency: 2x week  Duration in weeks: 8  Plan of Care beginning date: 2019  Plan of Care expiration date: 2020  Treatment plan discussed with: patient        Subjective Evaluation    History of Present Illness  Onset date: a year    Date of surgery: 10/23/2019  Mechanism of injury: She stated she has been having thumb pain for over a year during most activities, she had injections which she stated did not provide her relief and elected for a thumb suspensionplasty  Pain  Current pain ratin  At best pain ratin  At worst pain rating: 10  Quality: throbbing and sharp (Shooting)          Objective     Active Range of Motion     Left Wrist   Wrist flexion: 50 degrees   Wrist extension: 60 degrees   Radial deviation: 15 degrees with pain  Ulnar deviation: 20 degrees with pain      Left Thumb   Flexion     MP: 10 degrees    DIP: 20 degrees  Palmar Abduction     CMC: 45 degrees  Radial abduction    CMC: 60 degrees  Opposition: Full opposition, no thumb slide    Additional Active Range of Motion Details  Not tested per protocol    Strength/Myotome Testing     Additional Strength Details  Not tested per protcol    Swelling     Left Wrist/Hand   Circumference wrist: 18 cm    Right Wrist/Hand   Circumference wrist: 17 5 cm             Precautions: Thumb Suspensionplasty (10/23)  Post op day 3: Wound check, fabricate WHFO to be worn AAT besides bathing  Week 4-8: AAROM if MP, IP, and wrist, scar mgmt, opposition to IF and LF, thumb ext, progress to full opposition, isometric strengthening, wean splint at 6-8 weeks post-op, PROM of wrist if stiffness continues  Week 8-12: Begin strengthening, gentle  and pinch strengthening

## 2019-11-25 ENCOUNTER — OFFICE VISIT (OUTPATIENT)
Dept: OCCUPATIONAL THERAPY | Facility: MEDICAL CENTER | Age: 59
End: 2019-11-25
Payer: COMMERCIAL

## 2019-11-25 DIAGNOSIS — Z47.89 AFTERCARE FOLLOWING SURGERY OF THE MUSCULOSKELETAL SYSTEM: Primary | ICD-10-CM

## 2019-11-25 PROCEDURE — 97110 THERAPEUTIC EXERCISES: CPT

## 2019-11-25 PROCEDURE — 97530 THERAPEUTIC ACTIVITIES: CPT

## 2019-11-25 PROCEDURE — 97022 WHIRLPOOL THERAPY: CPT

## 2019-11-25 NOTE — PROGRESS NOTES
Daily Note     Today's date: 2019  Patient name: Phong Lau  : 1960  MRN: [id number]  Referring provider: Poncho Armando MD  Dx:   Encounter Diagnosis     ICD-10-CM    1  Aftercare following surgery of the musculoskeletal system Z47 89                   Subjective: It honestly is really good, I am pleased with it  Objective: See treatment diary below      Assessment: Tolerated treatment well  Patient would benefit from continued OT Good motion, instructed on extension stretch, added to HEP, verbalized understanding  No c/o pain      Plan: Continue Per Precautions     Precautions: Thumb Suspensionplasty (10/23)  Post op day 3: Wound check, fabricate WHFO to be worn AAT besides bathing  Week 4-8: AROM if MP, IP, and wrist, scar mgmt, opposition to IF and LF, thumb ext, progress to full opposition, isometric strengthening, wean splint at 6-8 weeks post-op, PROM of wrist if stiffness continues  Week 8-12: Begin strengthening, gentle  and pinch strengthening          Manual             Scar Management 8' 5'                                                                   Exercise Diary             Opposition Marbles 25x Keypegs 1x           Tennis ball opposition 2 min CW and CCW 2 min CW and CCW           Translation marbles 25x Marbles 25x           Wrist maze 10x 10x           Dr Beatty Stade Stretch  5x 5 seconds                                                                                                                                                                                                 HEP thumb and wrist AROM                Modalities              [facility] 5'

## 2019-12-02 ENCOUNTER — APPOINTMENT (OUTPATIENT)
Dept: OCCUPATIONAL THERAPY | Facility: MEDICAL CENTER | Age: 59
End: 2019-12-02
Payer: COMMERCIAL

## 2019-12-05 ENCOUNTER — OFFICE VISIT (OUTPATIENT)
Dept: OCCUPATIONAL THERAPY | Facility: MEDICAL CENTER | Age: 59
End: 2019-12-05
Payer: COMMERCIAL

## 2019-12-05 DIAGNOSIS — Z47.89 AFTERCARE FOLLOWING SURGERY OF THE MUSCULOSKELETAL SYSTEM: Primary | ICD-10-CM

## 2019-12-05 PROCEDURE — 97530 THERAPEUTIC ACTIVITIES: CPT

## 2019-12-05 PROCEDURE — 97022 WHIRLPOOL THERAPY: CPT

## 2019-12-05 PROCEDURE — 97110 THERAPEUTIC EXERCISES: CPT

## 2019-12-05 NOTE — PROGRESS NOTES
Daily Note     Today's date: 2019  Patient name: Rome Garcia  : 1960  MRN: [id number]  Referring provider: Alison Rodriguez MD  Dx:   Encounter Diagnosis     ICD-10-CM    1  Aftercare following surgery of the musculoskeletal system Z47 89                   Subjective: I opened the toothpaste this morning! Objective: See treatment diary below      Assessment: Tolerated treatment well  Patient would benefit from continued OT Great motion, will begin isometrics next week if improvements continue      Plan: Continue Per Precautions     Precautions: Thumb Suspensionplasty (10/23)  Post op day 3: Wound check, fabricate WHFO to be worn AAT besides bathing  Week 4-8: AROM if MP, IP, and wrist, scar mgmt, opposition to IF and LF, thumb ext, progress to full opposition, isometric strengthening, wean splint at 6-8 weeks post-op, PROM of wrist if stiffness continues  Week 8-12: Begin strengthening, gentle  and pinch strengthening          Manual             Scar Management 8' 5'                                                                   Exercise Diary            Opposition Marbles 25x Keypegs 1x           Tennis ball opposition 2 min CW and CCW 2 min CW and CCW 2 min CW and CCW          Translation marbles 25x Marbles 25x Keypegs 2x          Wrist maze 10x 10x 10x          Dr Cari Lema Stretch  5x 5 seconds 5x 5 seconds          Gross Grasp   RPW 30x, GPW 30x                                                                                                                                                                                   HEP thumb and wrist AROM                Modalities             MHP 5'            Fluido  15'

## 2019-12-09 ENCOUNTER — APPOINTMENT (OUTPATIENT)
Dept: OCCUPATIONAL THERAPY | Facility: MEDICAL CENTER | Age: 59
End: 2019-12-09
Payer: COMMERCIAL

## 2019-12-12 ENCOUNTER — OFFICE VISIT (OUTPATIENT)
Dept: OCCUPATIONAL THERAPY | Facility: MEDICAL CENTER | Age: 59
End: 2019-12-12
Payer: COMMERCIAL

## 2019-12-12 DIAGNOSIS — Z47.89 AFTERCARE FOLLOWING SURGERY OF THE MUSCULOSKELETAL SYSTEM: Primary | ICD-10-CM

## 2019-12-12 PROCEDURE — 97110 THERAPEUTIC EXERCISES: CPT

## 2019-12-12 PROCEDURE — 97022 WHIRLPOOL THERAPY: CPT

## 2019-12-12 PROCEDURE — 97530 THERAPEUTIC ACTIVITIES: CPT

## 2019-12-12 NOTE — PROGRESS NOTES
Daily Note     Today's date: 2019  Patient name: Pat Albrecht  : 1960  MRN: [id number]  Referring provider: Jason Hay MD  Dx:   Encounter Diagnosis     ICD-10-CM    1  Aftercare following surgery of the musculoskeletal system Z47 89                   Subjective: The isometrics are hard! Objective: See treatment diary below      Assessment: Tolerated treatment well  Patient would benefit from continued OT Great motion, began isometrics with good tolerance, added to HEP  May begin strengthening next week per protocol       Plan: Continue Per Precautions     Precautions: Thumb Suspensionplasty (10/23)  Post op day 3: Wound check, fabricate WHFO to be worn AAT besides bathing  Week 4-8: AROM if MP, IP, and wrist, scar mgmt, opposition to IF and LF, thumb ext, progress to full opposition, isometric strengthening, wean splint at 6-8 weeks post-op, PROM of wrist if stiffness continues  Week 8-12: Begin strengthening, gentle  and pinch strengthening          Manual             Scar Management 8' 5'                                                                   Exercise Diary           Opposition Marbles 25x Keypegs 1x           Tennis ball opposition 2 min CW and CCW 2 min CW and CCW 2 min CW and CCW 2 min CW and CCW         Translation marbles 25x Marbles 25x Keypegs 2x Keypegs 2x         Wrist maze 10x 10x 10x 10x         Dr Delmar Camejo Stretch  5x 5 seconds 5x 5 seconds 5x 5 seconds         Gross Grasp   RPW 30x, GPW 30x GPW 30x         Thumb isometrics    x10                                                                                                                                                                     HEP thumb and wrist AROM   isometrics             Modalities            [facility] 5'            Fluido  15' 15'

## 2019-12-16 ENCOUNTER — APPOINTMENT (OUTPATIENT)
Dept: OCCUPATIONAL THERAPY | Facility: MEDICAL CENTER | Age: 59
End: 2019-12-16
Payer: COMMERCIAL

## 2019-12-19 ENCOUNTER — OFFICE VISIT (OUTPATIENT)
Dept: OCCUPATIONAL THERAPY | Facility: MEDICAL CENTER | Age: 59
End: 2019-12-19
Payer: COMMERCIAL

## 2019-12-19 DIAGNOSIS — Z47.89 AFTERCARE FOLLOWING SURGERY OF THE MUSCULOSKELETAL SYSTEM: Primary | ICD-10-CM

## 2019-12-19 PROCEDURE — 97110 THERAPEUTIC EXERCISES: CPT

## 2019-12-19 PROCEDURE — 97530 THERAPEUTIC ACTIVITIES: CPT

## 2019-12-19 NOTE — PROGRESS NOTES
Daily Note     Today's date: 2019  Patient name: Waldemar Loco  : 1960  MRN: [id number]  Referring provider: Juan Luis Smith MD  Dx:   Encounter Diagnosis     ICD-10-CM    1  Aftercare following surgery of the musculoskeletal system Z47 89                   Subjective: It just feels week      Objective: See treatment diary below      Assessment: Tolerated treatment well  Patient would benefit from continued OT Great motion, Began strengthening, tolerated very well, will add strengthening to HEP next visit   Some hyperextension noted in MP joint with pinching    Plan: Continue Per Precautions     Precautions: Thumb Suspensionplasty (10/23)  Post op day 3: Wound check, fabricate WHFO to be worn AAT besides bathing  Week 4-8: AROM if MP, IP, and wrist, scar mgmt, opposition to IF and LF, thumb ext, progress to full opposition, isometric strengthening, wean splint at 6-8 weeks post-op, PROM of wrist if stiffness continues  Week 8-12: Begin strengthening, gentle  and pinch strengthening          Manual             Scar Management 8' 5'                                                                   Exercise Diary          Opposition Marbles 25x Keypegs 1x           Tennis ball opposition 2 min CW and CCW 2 min CW and CCW 2 min CW and CCW 2 min CW and CCW 2 min CW and CCW        Translation marbles 25x Marbles 25x Keypegs 2x Keypegs 2x         Wrist maze 10x 10x 10x 10x         Dr Brad Thompson Stretch  5x 5 seconds 5x 5 seconds 5x 5 seconds         Gross Grasp   RPW 30x, GPW 30x GPW 30x BPW 30x YPW power and cylindrical        Thumb isometrics    x10         Thumb flexion      [id number]        Pinch Ring     Y/R 2x        FPLcizer     30x        EDC     Jar 1x                                                                                                                HEP thumb and wrist AROM   isometrics             Modalities           [facility] 5'   15' Fluido  15' 13'

## 2019-12-20 ENCOUNTER — OFFICE VISIT (OUTPATIENT)
Dept: OCCUPATIONAL THERAPY | Facility: MEDICAL CENTER | Age: 59
End: 2019-12-20
Payer: COMMERCIAL

## 2019-12-20 DIAGNOSIS — Z47.89 AFTERCARE FOLLOWING SURGERY OF THE MUSCULOSKELETAL SYSTEM: Primary | ICD-10-CM

## 2019-12-20 PROCEDURE — 97112 NEUROMUSCULAR REEDUCATION: CPT

## 2019-12-20 PROCEDURE — G8990 OTHER PT/OT CURRENT STATUS: HCPCS

## 2019-12-20 PROCEDURE — G8991 OTHER PT/OT GOAL STATUS: HCPCS

## 2019-12-20 PROCEDURE — 97530 THERAPEUTIC ACTIVITIES: CPT

## 2019-12-20 PROCEDURE — 97110 THERAPEUTIC EXERCISES: CPT

## 2019-12-20 NOTE — PROGRESS NOTES
Daily Note     Today's date: 2019  Patient name: Patricia Benoit  : 1960  MRN: [id number]  Referring provider: Candace Darden MD  Dx:   Encounter Diagnosis     ICD-10-CM    1  Aftercare following surgery of the musculoskeletal system Z47 89                   Subjective: This is easier than yesterday! (referencing PREs)      Objective: See treatment diary below      Assessment: Tolerated treatment well   Patient would benefit from continued OT See RE  Plan: Continue Per Precautions     Precautions: Thumb Suspensionplasty (10/23)  Post op day 3: Wound check, fabricate WHFO to be worn AAT besides bathing  Week 4-8: AROM if MP, IP, and wrist, scar mgmt, opposition to IF and LF, thumb ext, progress to full opposition, isometric strengthening, wean splint at 6-8 weeks post-op, PROM of wrist if stiffness continues  Week 8-12: Begin strengthening, gentle  and pinch strengthening          Manual             Scar Management 8' 5'                                                                   Exercise Diary         Opposition Marbles 25x Keypegs 1x           Tennis ball opposition 2 min CW and CCW 2 min CW and CCW 2 min CW and CCW 2 min CW and CCW 2 min CW and CCW        Translation marbles 25x Marbles 25x Keypegs 2x Keypegs 2x         Wrist maze 10x 10x 10x 10x         Dr Brewer Alicja Stretch  5x 5 seconds 5x 5 seconds 5x 5 seconds         Gross Grasp   RPW 30x, GPW 30x GPW 30x BPW 30x YPW power and cylindrical BPW 30x, YPW power and cylindrical       Thumb isometrics    x10         Thumb flexion      [id number] Yellow 30x       Pinch Ring     Y/R 2x Y/R 2x       FPLcizer     30x 45x       EDC     Jar 1x Jar 2x       Jar Turning      CW and CCW 15x Yellow putty       Pegs      Yellow in, 10# out                                                                                     HEP thumb and wrist AROM   isometrics             Modalities   P 5'   15' 5'        Fluido  15' 15'

## 2019-12-20 NOTE — PROGRESS NOTES
OT Re-Evaluation     Today's date: 2019  Patient name: Tyesha Hernandez  : 1960  MRN: [id number]  Referring provider: Mahogany Partida MD  Dx:   Encounter Diagnosis     ICD-10-CM    1  Aftercare following surgery of the musculoskeletal system Z47 89                   Assessment  Assessment details: Deangelo Jerez presents in her post-op dressing  Her sutures are intact with no s/s of infection  She was fabricated a [facility] orthotic to be worn at all time except bathing  She was given orthotic instructions and verbalized understanding  She was given tendon gliding exercises to be done 10x every waking hour  She was made aware of the s/s of infection and precautions of her surgery and verbalized understanding  She will return in 4 weeks for a re-evaluation and to begin formal therapy  : Deangelo Jerez presents to begin formal therapy 4 weeks post-op thumb suspensionplasty  She has WFL wrist ROM, decreased thumb ROM, increased pain with function and edema  She has been compliant with her splint and digit ROM HEP  Instructed in thumb and wrist AROM HEP today, she verbalized understanding  She would benefit from continued OT to increase her ROM, strength (when cleared), decrease her edema, and allow her to meet her goals and return to ADL[facility]IADLs at [facility]     : Lloyd continue to improve with ROM and strength  Strengthening has been completed for 1 session which she tolerated well  Gentle strengthening added to her HEP  Recommend cont  Therapy in order to increase her strength and allow her to complete her ADL/IADLs at [facility]  Impairments: abnormal or restricted ROM, activity intolerance, impaired physical strength, lacks appropriate home exercise program and pain with function    Goals  STG 1: Compliant with splint 100% of the time MET  STG 2: Decrease overall pain to 4/10 in 4-6 weeks  PROGRESSING  STG 4: Compliant with HEP in 2 weeks   MET    LTG 1: Complete all ADL/IADLs improved to prior level of function within 6-8 weeks  LTG 2: Leisure/social skills improved within 6-8 weeks  LTG 3: Work skills improved to prior level of function within 6-8 weeks    Patient Goal: To be able to use my hand    Goals, plan of care and treatment condition discussed with patient  Patient expresses their understanding and questions regarding these isues were addressed  New Goals :     STG 1: Increase thumb AROM 25% in 4-6 weeks  PROGRESSING  STG 2: Increase overall strength by 25% in 6 weeks  PROGRESSING      Plan  Plan details: Post op day 3: Wound check, fabricate WHFO to be worn AAT besides bathing  Week 4-8: AAROM if MP, IP, and wrist, scar mgmt, opposition to IF and LF, thumb ext, progress to full opposition, isometric strengthening, wean splint at 6-8 weeks post-op, PROM of wrist if stiffness continues  Week 8-12: Begin strengthening, gentle  and pinch strengthening  Patient would benefit from: custom splinting, OT eval and skilled occupational therapy  Planned modality interventions: thermotherapy: paraffin bath, thermotherapy: hydrocollator packs, fluidotherapy, cryotherapy and ultrasound  Planned therapy interventions: manual therapy, Castaneda taping, motor coordination training, orthotic fitting/training, home exercise program, graded activity, functional ROM exercises, graded exercise, fine motor coordination training, therapeutic activities and therapeutic exercise  Frequency: 2x week  Duration in weeks: 8  Plan of Care beginning date: 2019  Plan of Care expiration date: 2020  Treatment plan discussed with: patient        Subjective Evaluation    History of Present Illness  Onset date: a year    Date of surgery: 10/23/2019  Mechanism of injury: She stated she has been having thumb pain for over a year during most activities, she had injections which she stated did not provide her relief and elected for a thumb suspensionplasty  Pain  Current pain ratin  At best pain ratin  At worst pain ratin  Quality: throbbing and sharp (Shooting)  Relieving factors: medications and heat    Hand dominance: right          Objective     Active Range of Motion     Left Wrist   Wrist flexion: 50 degrees   Wrist extension: 65 degrees   Radial deviation: 20 degrees with pain  Ulnar deviation: 20 degrees     Left Thumb   Flexion     MP: 32 degrees    DIP: 70 degrees  Palmar Abduction     CMC: 55 degrees  Radial abduction    CMC: 65 degrees  Opposition: Full opposition, no thumb slide    Additional Active Range of Motion Details  Not tested per protocol    Strength/Myotome Testing     Left Wrist/Hand      (2nd hand position)     Trial 1: 23    Thumb Strength  Key/Lateral Pinch     Trail 1: 4  Tip/Two-Point Pinch     Trial 1: 3  Palmar/Three-Point Pinch     Trial 1: 4    Right Wrist/Hand      (2nd hand position)     Trial 1: 55    Thumb Strength   Key/Lateral Pinch     Trial 1: 14  Tip/Two-Point Pinch     Trial 1: 9  Palmar/Three-Point Pinch     Trial 1: 10             Precautions: Thumb Suspensionplasty (10/23)  Post op day 3: Wound check, fabricate WHFO to be worn AAT besides bathing  Week 4-8: AAROM if MP, IP, and wrist, scar mgmt, opposition to IF and LF, thumb ext, progress to full opposition, isometric strengthening, wean splint at 6-8 weeks post-op, PROM of wrist if stiffness continues  Week 8-12: Begin strengthening, gentle  and pinch strengthening

## 2019-12-26 ENCOUNTER — OFFICE VISIT (OUTPATIENT)
Dept: OCCUPATIONAL THERAPY | Facility: MEDICAL CENTER | Age: 59
End: 2019-12-26
Payer: COMMERCIAL

## 2019-12-26 DIAGNOSIS — Z47.89 AFTERCARE FOLLOWING SURGERY OF THE MUSCULOSKELETAL SYSTEM: Primary | ICD-10-CM

## 2019-12-26 PROCEDURE — 97530 THERAPEUTIC ACTIVITIES: CPT

## 2019-12-26 PROCEDURE — 97110 THERAPEUTIC EXERCISES: CPT

## 2019-12-26 PROCEDURE — 97112 NEUROMUSCULAR REEDUCATION: CPT

## 2019-12-26 NOTE — PROGRESS NOTES
Daily Note     Today's date: 2019  Patient name: Shaniqua Latif  : 1960  MRN: [id number]  Referring provider: Lois Gallagher MD  Dx:   Encounter Diagnosis     ICD-10-CM    1  Aftercare following surgery of the musculoskeletal system Z47 89                   Subjective: I can really tell it's stronger! Objective: See treatment diary below      Assessment: Tolerated treatment well  Patient would benefit from continued OT Upgrade as tolerated    Plan: Continue Per Precautions     Precautions: Thumb Suspensionplasty (10/23)  Post op day 3: Wound check, fabricate WHFO to be worn AAT besides bathing  Week 4-8: AROM if MP, IP, and wrist, scar mgmt, opposition to IF and LF, thumb ext, progress to full opposition, isometric strengthening, wean splint at 6-8 weeks post-op, PROM of wrist if stiffness continues  Week 8-12: Begin strengthening, gentle  and pinch strengthening          Manual             Scar Management 8' 5'                                                                   Exercise Diary        Opposition Marbles 25x Keypegs 1x           Tennis ball opposition 2 min CW and CCW 2 min CW and CCW 2 min CW and CCW 2 min CW and CCW 2 min CW and CCW        Translation marbles 25x Marbles 25x Keypegs 2x Keypegs 2x         Wrist maze 10x 10x 10x 10x         Dr Caridad St Stretch  5x 5 seconds 5x 5 seconds 5x 5 seconds         Gross Grasp   RPW 30x, GPW 30x GPW 30x BPW 30x YPW power and cylindrical BPW 30x, YPW power and cylindrical BPW 30x, RPW power and cylindrical      Thumb isometrics    x10         Thumb flexion      [id number] Yellow 30x RPW 30x      Pinch Ring     Y/R 2x Y/R 2x       FPLcizer     30x 45x 50x      EDC     Jar 1x Jar 2x Jar 1x      Jar Turning      CW and CCW 15x Yellow putty CW and CCW 15x red putty      Pegs      Yellow in, 10# out Red in, 15# out      TP push       Yellow and red 3x10      UBE       3/3 2 5 resist HEP thumb and wrist AROM   isometrics             Modalities  11/21 12/5 12/12 12/19 12/20        MHP 5'   15' 5'        Fluido  15' 15'

## 2019-12-30 ENCOUNTER — OFFICE VISIT (OUTPATIENT)
Dept: OBGYN CLINIC | Facility: MEDICAL CENTER | Age: 59
End: 2019-12-30

## 2019-12-30 ENCOUNTER — APPOINTMENT (OUTPATIENT)
Dept: OCCUPATIONAL THERAPY | Facility: MEDICAL CENTER | Age: 59
End: 2019-12-30
Payer: COMMERCIAL

## 2019-12-30 VITALS
WEIGHT: 170.8 LBS | HEART RATE: 76 BPM | BODY MASS INDEX: 29.16 KG/M2 | SYSTOLIC BLOOD PRESSURE: 111 MMHG | HEIGHT: 64 IN | DIASTOLIC BLOOD PRESSURE: 75 MMHG

## 2019-12-30 DIAGNOSIS — M19.049 CMC ARTHRITIS: ICD-10-CM

## 2019-12-30 DIAGNOSIS — Z48.89 AFTERCARE FOLLOWING SURGERY: Primary | ICD-10-CM

## 2019-12-30 PROCEDURE — 99024 POSTOP FOLLOW-UP VISIT: CPT | Performed by: ORTHOPAEDIC SURGERY

## 2019-12-30 NOTE — PROGRESS NOTES
CHIEF COMPLAINT:  Chief Complaint   Patient presents with    Left Thumb - Post-op       SUBJECTIVE:  Raj Norman is a 61y o  year old  female who presents for follow up after surgery left thumb suspension-plasty,  performed on  10/23/2019  Today patient states that she no complaints of pain  Pt states that she has just started working on strengthening at therapy  Pt has no complaints of pain at this time  PAST MEDICAL HISTORY:  Past Medical History:   Diagnosis Date    Abnormal Pap smear of cervix     Breast cancer ([facility] [address] ) 02/06/2009    right breast    Cancer ([facility] [address] )     Right breast cancer R lymph node bx neg  Last assessed: 6/4/13    Carpal tunnel syndrome     Chronic kidney disease     History of radiation therapy 03/2009    Kidney stone     Last assessed: 1/7/16    Osteoporosis     Sleep apnea        PAST SURGICAL HISTORY:  Past Surgical History:   Procedure Laterality Date    BLADDER SUSPENSION      BREAST LUMPECTOMY Right 02/06/2009    malignant    CHOLECYSTECTOMY      COLONOSCOPY      HAND SURGERY      LITHOTRIPSY Left     TN COLONOSCOPY FLX DX W/COLLJ SPEC WHEN PFRMD N/A 7/10/2017    Procedure: COLONOSCOPY;  Surgeon: Nereida Cogan, MD;  [facility];   Service: Gastroenterology    TN EXC SKIN BENIG 0 6-1 CM TRUNK,ARM,LEG Left 10/23/2019    Procedure: UPPER EXTREMITY LESION/MASS EXCISION BIOPSY;  Surgeon: Anushka Barajas MD;  [facility];  Service: Orthopedics    TN REPAIR INTERCARP/CARP-METACARP JT Left 10/23/2019    Procedure: Andria Quezada;  Surgeon: Anushka Barajas MD;  [facility];  Service: Orthopedics    TN WRIST Selina Eisenmenger LIG Left 10/3/2018    Procedure: ENDOSCOPIC CARPAL TUNNEL RELEASE;  Surgeon: Anushka Barajas MD;  [facility];  Service: Orthopedics    SENTINEL LYMPH NODE BIOPSY      WRIST SURGERY         FAMILY HISTORY:  Family History   Problem Relation Age of Onset    Skin cancer Mother     Hypertension Mother  Skin cancer Father     Hypertension Father     Prostate cancer Father 79        2 bouts    Diabetes Sister         Mellitus    Breast cancer Sister 40    Uterine cancer Sister 48    Skin cancer Sister     Colon polyps Sister         Sigmoid    Cancer Family     Thyroid disease Family         Disorder    Breast cancer Maternal Aunt 40        mets    Breast cancer Cousin        SOCIAL HISTORY:  Social History     Tobacco Use    Smoking status: Never Smoker    Smokeless tobacco: Never Used   Substance Use Topics    Alcohol use: Yes     Frequency: Monthly or less     Drinks per session: 1 or 2     Comment: Rare    Drug use: No       MEDICATIONS:    Current Outpatient Medications:     alendronate (FOSAMAX) 70 mg tablet, TAKE 1 TABLET BY MOUTH ONCE A WEEK, Disp: 13 tablet, Rfl: 1    fluticasone (FLONASE) 50 mcg/act nasal spray, 1 spray into each nostril daily, Disp: 3 Bottle, Rfl: 1    montelukast (SINGULAIR) 10 mg tablet, Take 1 tablet (10 mg total) by mouth daily at bedtime, Disp: 90 tablet, Rfl: 1    PARoxetine (PAXIL) 10 mg tablet, Take 1 tablet (10 mg total) by mouth daily, Disp: 90 tablet, Rfl: 0    ALLERGIES:  No Known Allergies    REVIEW OF SYSTEMS:  Review of Systems   Constitutional: Negative for chills, fever and unexpected weight change  HENT: Negative for hearing loss, nosebleeds and sore throat  Eyes: Negative for pain, redness and visual disturbance  Respiratory: Negative for cough, shortness of breath and wheezing  Cardiovascular: Negative for chest pain, palpitations and leg swelling  Gastrointestinal: Negative for abdominal pain, nausea and vomiting  Endocrine: Negative for polydipsia and polyuria  Genitourinary: Negative for dysuria and hematuria  Skin: Negative for rash and wound  Neurological: Negative for dizziness and headaches  Psychiatric/Behavioral: Negative for decreased concentration, dysphoric mood and suicidal ideas   The patient is not nervous/anxious  VITALS:  Vitals:    12/30/19 1046   BP: 111/75   Pulse: 76       LABS:  HgA1c: No results found for: HGBA1C  BMP:   Lab Results   Component Value Date    CALCIUM 9 1 10/14/2019    K 3 5 10/14/2019    CO2 24 10/14/2019     (H) 10/14/2019    BUN 12 10/14/2019    CREATININE 0 79 10/14/2019       _____________________________________________________  PHYSICAL EXAMINATION:  General: well developed and well nourished, alert, oriented times 3 and appears comfortable  Psychiatric: Normal  HEENT: Trachea Midline, No torticollis  Pulmonary: No audible wheezing or strider  Cardiovascular: No discernable arrhythmia   Skin: No masses, erythema, lacerations, fluctation, ulcerations  Neurovascular: Sensation Intact to the Median, Ulnar, Radial Nerve, Motor Intact to the Median, Ulnar, Radial Nerve and Pulses Intact    MUSCULOSKELETAL EXAMINATION:  Left thumb   No pain with motion of the thumb  Pt is able to pass table top test but has limited abduction of the thumb    ___________________________________________________  STUDIES REVIEWED:  No studies reviewed  PROCEDURES PERFORMED:  Procedures  No Procedures performed today    _____________________________________________________  ASSESSMENT/PLAN:    S/P left CMC suspension-plasty  * Pt was advised to finish therapy and motion at home as showed in the office today  * Pt does not have any activity limitations  * Pt was advised to call the office if she has any questions or concerns    Follow Up:  Return if symptoms worsen or fail to improve          To Do Next Visit:  Re-evaluation of current issue      Scribe Attestation    I,:   Catarina Barcenas am acting as a scribe while in the presence of the attending physician :        I,:   Arian May MD personally performed the services described in this documentation    as scribed in my presence :

## 2020-01-02 ENCOUNTER — OFFICE VISIT (OUTPATIENT)
Dept: OCCUPATIONAL THERAPY | Facility: MEDICAL CENTER | Age: 60
End: 2020-01-02
Payer: COMMERCIAL

## 2020-01-02 DIAGNOSIS — Z47.89 AFTERCARE FOLLOWING SURGERY OF THE MUSCULOSKELETAL SYSTEM: Primary | ICD-10-CM

## 2020-01-02 PROCEDURE — 97530 THERAPEUTIC ACTIVITIES: CPT

## 2020-01-02 PROCEDURE — 97112 NEUROMUSCULAR REEDUCATION: CPT

## 2020-01-02 PROCEDURE — 97110 THERAPEUTIC EXERCISES: CPT

## 2020-01-02 NOTE — PROGRESS NOTES
Daily Note     Today's date: 2020  Patient name: Governor Mendenhall  : 1960  MRN: [id number]  Referring provider: Jackie Medrano MD  Dx:   Encounter Diagnosis     ICD-10-CM    1  Aftercare following surgery of the musculoskeletal system Z47 89                   Subjective: I can really tell it's stronger! Objective: See treatment diary below      Assessment: Tolerated treatment well  Patient would benefit from continued OT Decrease frequency to 1x per week till D/C Upgrade as tolerated      Plan: Continue Per Precautions     Precautions: Thumb Suspensionplasty (10/23)  Post op day 3: Wound check, fabricate WHFO to be worn AAT besides bathing  Week 4-8: AROM if MP, IP, and wrist, scar mgmt, opposition to IF and LF, thumb ext, progress to full opposition, isometric strengthening, wean splint at 6-8 weeks post-op, PROM of wrist if stiffness continues  Week 8-12: Begin strengthening, gentle  and pinch strengthening          Manual             Scar Management 8' 5'                                                                   Exercise Diary   1/2     Opposition Marbles 25x Keypegs 1x           Tennis ball opposition 2 min CW and CCW 2 min CW and CCW 2 min CW and CCW 2 min CW and CCW 2 min CW and CCW        Translation marbles 25x Marbles 25x Keypegs 2x Keypegs 2x         Wrist maze 10x 10x 10x 10x         Dr  Minus Dona Stretch  5x 5 seconds 5x 5 seconds 5x 5 seconds         Gross Grasp   RPW 30x, GPW 30x GPW 30x BPW 30x YPW power and cylindrical BPW 30x, YPW power and cylindrical BPW 30x, RPW power and cylindrical BPW 30x, GPW power and cylindrical     Thumb isometrics    x10         Thumb flexion      [id number] Yellow 30x RPW 30x GPW 30X     Pinch Ring     Y/R 2x Y/R 2x       FPLcizer     30x 45x 50x 50x     EDC     Jar 1x Jar 2x Jar 1x Jar 1x     Jar Turning      CW and CCW 15x Yellow putty CW and CCW 15x red putty CW and CCW 15x red putty      Pegs Yellow in, 10# out Red in, 15# out Red in, 15# out     TP push       Yellow and red 3x10 Yellow and red 3x10     UBE       3/3 2 5 resist 4/4 2 5 resist     Wrist Strengthening        RFB 3x10                                            HEP thumb and wrist AROM   isometrics             Modalities  11/21 12/5 12/12 12/19 12/20        MHP 5'   15' 5'        Fluido  15' 15'

## 2020-01-06 ENCOUNTER — OFFICE VISIT (OUTPATIENT)
Dept: OCCUPATIONAL THERAPY | Facility: MEDICAL CENTER | Age: 60
End: 2020-01-06
Payer: COMMERCIAL

## 2020-01-06 DIAGNOSIS — Z47.89 AFTERCARE FOLLOWING SURGERY OF THE MUSCULOSKELETAL SYSTEM: Primary | ICD-10-CM

## 2020-01-06 PROCEDURE — 97530 THERAPEUTIC ACTIVITIES: CPT

## 2020-01-06 PROCEDURE — 97150 GROUP THERAPEUTIC PROCEDURES: CPT

## 2020-01-06 PROCEDURE — 97110 THERAPEUTIC EXERCISES: CPT

## 2020-01-06 NOTE — PROGRESS NOTES
Daily Note     Today's date: 2020  Patient name: Dolores Richards  : 1960  MRN: [id number]  Referring provider: Marely Oviedo MD  Dx:   Encounter Diagnosis     ICD-10-CM    1  Aftercare following surgery of the musculoskeletal system Z47 89                   Subjective: Last time I left here my thumb felt normal for the first time in a long time  Objective: See treatment diary below  Group 5:00-5:15      Assessment: Tolerated treatment well  Patient would benefit from continued OT Decrease frequency to 1x per week till D/C Upgrade as tolerated      Plan: Continue Per Precautions     Precautions: Thumb Suspensionplasty (10/23)  Post op day 3: Wound check, fabricate WHFO to be worn AAT besides bathing  Week 4-8: AROM if MP, IP, and wrist, scar mgmt, opposition to IF and LF, thumb ext, progress to full opposition, isometric strengthening, wean splint at 6-8 weeks post-op, PROM of wrist if stiffness continues  Week 8-12: Begin strengthening, gentle  and pinch strengthening          Manual             Scar Management 8' 5'                                                                   Exercise Diary      Opposition Marbles 25x Keypegs 1x           Tennis ball opposition 2 min CW and CCW 2 min CW and CCW 2 min CW and CCW 2 min CW and CCW 2 min CW and CCW        Translation marbles 25x Marbles 25x Keypegs 2x Keypegs 2x         Wrist maze 10x 10x 10x 10x         Dr Joo Moreira Stretch  5x 5 seconds 5x 5 seconds 5x 5 seconds         Gross Grasp   RPW 30x, GPW 30x GPW 30x BPW 30x YPW power and cylindrical BPW 30x, YPW power and cylindrical BPW 30x, RPW power and cylindrical BPW 30x, GPW power and cylindrical BPW 30x and GPW power and cylindrical    Thumb isometrics    x10         Thumb flexion      [id number] Yellow 30x RPW 30x GPW 30X RPW 30x    Pinch Ring     Y/R 2x Y/R 2x       FPLcizer     30x 45x 50x 50x 50x    EDC     Jar 1x Jar 2x Jar 1x Jar 1x Jar 1x, Yellow 2x10    Jar Turning      CW and CCW 15x Yellow putty CW and CCW 15x red putty CW and CCW 15x red putty  CW and CCW 15x Red putty    Pegs      Yellow in, 10# out Red in, 15# out Red in, 15# out Red/Green in, 15# out    TP push       Yellow and red 3x10 Yellow and red 3x10     UBE       3/3 2 5 resist 4/4 2 5 resist 4/4 3 resist    Wrist Strengthening        RFB 3x10 RFB 3x10, GFB x10                                           HEP thumb and wrist AROM   isometrics             Modalities  11/21 12/5 12/12 12/19 12/20        MHP 5'   15' 5'        Fluido  15' 15'

## 2020-01-09 ENCOUNTER — APPOINTMENT (OUTPATIENT)
Dept: OCCUPATIONAL THERAPY | Facility: MEDICAL CENTER | Age: 60
End: 2020-01-09
Payer: COMMERCIAL

## 2020-01-13 ENCOUNTER — OFFICE VISIT (OUTPATIENT)
Dept: OCCUPATIONAL THERAPY | Facility: MEDICAL CENTER | Age: 60
End: 2020-01-13
Payer: COMMERCIAL

## 2020-01-13 DIAGNOSIS — Z47.89 AFTERCARE FOLLOWING SURGERY OF THE MUSCULOSKELETAL SYSTEM: Primary | ICD-10-CM

## 2020-01-13 PROCEDURE — 97530 THERAPEUTIC ACTIVITIES: CPT

## 2020-01-13 PROCEDURE — 97110 THERAPEUTIC EXERCISES: CPT

## 2020-01-13 NOTE — PROGRESS NOTES
Daily Note     Today's date: 2020  Patient name: Phong Lau  : 1960  MRN: [id number]  Referring provider: Poncho Armando MD  Dx:   Encounter Diagnosis     ICD-10-CM    1  Aftercare following surgery of the musculoskeletal system Z47 89                   Subjective: It feels mostly normal      Objective: See treatment diary below      Assessment: Tolerated treatment well   Patient exhibited good technique with therapeutic exercises D/C to HEP    Plan: D/C to HEP     Precautions: Thumb Suspensionplasty (10/23)  Post op day 3: Wound check, fabricate WHFO to be worn AAT besides bathing  Week 4-8: AROM if MP, IP, and wrist, scar mgmt, opposition to IF and LF, thumb ext, progress to full opposition, isometric strengthening, wean splint at 6-8 weeks post-op, PROM of wrist if stiffness continues  Week 8-12: Begin strengthening, gentle  and pinch strengthening          Manual             Scar Management 8' 5'                                                                   Exercise Diary     Opposition Marbles 25x Keypegs 1x           Tennis ball opposition 2 min CW and CCW 2 min CW and CCW 2 min CW and CCW 2 min CW and CCW 2 min CW and CCW        Translation marbles 25x Marbles 25x Keypegs 2x Keypegs 2x         Wrist maze 10x 10x 10x 10x         Dr  Rogerio Stade Stretch  5x 5 seconds 5x 5 seconds 5x 5 seconds         Gross Grasp   RPW 30x, GPW 30x GPW 30x BPW 30x YPW power and cylindrical BPW 30x, YPW power and cylindrical BPW 30x, RPW power and cylindrical BPW 30x, GPW power and cylindrical BPW 30x and GPW power and cylindrical Black PW 3x10 and Blue power and cylindrical   Thumb isometrics    x10         Thumb flexion      [id number] Yellow 30x RPW 30x GPW 30X RPW 30x GPW 30x, BPW 30x   Pinch Ring     Y/R 2x Y/R 2x    G/G 2x   FPLcizer     30x 45x 50x 50x 50x    EDC     Jar 1x Jar 2x Jar 1x Jar 1x Jar 1x, Yellow 2x10 Yellow 3x10   Jar Turning CW and CCW 15x Yellow putty CW and CCW 15x red putty CW and CCW 15x red putty  CW and CCW 15x Red putty CW and CCW 15x Green putty   Pegs      Yellow in, 10# out Red in, 15# out Red in, 15# out Red/Green in, 15# out    TP push       Yellow and red 3x10 Yellow and red 3x10     UBE       3/3 2 5 resist 4/4 2 5 resist 4/4 3 resist 4/4 4 5 resist   Wrist Strengthening        RFB 3x10 RFB 3x10, GFB x10 GFB 3x10                                          HEP thumb and wrist AROM   isometrics             Modalities  11/21 12/5 12/12 12/19 12/20        MHP 5'   15' 5'        Fluido  15' 15'

## 2020-01-16 ENCOUNTER — APPOINTMENT (OUTPATIENT)
Dept: OCCUPATIONAL THERAPY | Facility: MEDICAL CENTER | Age: 60
End: 2020-01-16
Payer: COMMERCIAL

## 2020-02-13 DIAGNOSIS — M81.0 OSTEOPOROSIS WITHOUT CURRENT PATHOLOGICAL FRACTURE, UNSPECIFIED OSTEOPOROSIS TYPE: ICD-10-CM

## 2020-02-14 RX ORDER — ALENDRONATE SODIUM 70 MG/1
TABLET ORAL
Qty: 13 TABLET | Refills: 0 | Status: SHIPPED | OUTPATIENT
Start: 2020-02-14 | End: 2020-10-05 | Stop reason: SDUPTHER

## 2020-04-22 DIAGNOSIS — F32.A MILD DEPRESSION: ICD-10-CM

## 2020-04-22 RX ORDER — PAROXETINE 10 MG/1
10 TABLET, FILM COATED ORAL DAILY
Qty: 30 TABLET | Refills: 0 | Status: SHIPPED | OUTPATIENT
Start: 2020-04-22 | End: 2020-05-12 | Stop reason: SDUPTHER

## 2020-05-12 ENCOUNTER — APPOINTMENT (OUTPATIENT)
Dept: LAB | Facility: MEDICAL CENTER | Age: 60
End: 2020-05-12
Payer: COMMERCIAL

## 2020-05-12 ENCOUNTER — OFFICE VISIT (OUTPATIENT)
Dept: FAMILY MEDICINE CLINIC | Facility: MEDICAL CENTER | Age: 60
End: 2020-05-12
Payer: COMMERCIAL

## 2020-05-12 VITALS
RESPIRATION RATE: 14 BRPM | WEIGHT: 171 LBS | TEMPERATURE: 98.8 F | OXYGEN SATURATION: 97 % | HEART RATE: 88 BPM | BODY MASS INDEX: 30.3 KG/M2 | DIASTOLIC BLOOD PRESSURE: 70 MMHG | SYSTOLIC BLOOD PRESSURE: 110 MMHG | HEIGHT: 63 IN

## 2020-05-12 DIAGNOSIS — E55.9 VITAMIN D DEFICIENCY: ICD-10-CM

## 2020-05-12 DIAGNOSIS — M81.0 AGE-RELATED OSTEOPOROSIS WITHOUT CURRENT PATHOLOGICAL FRACTURE: ICD-10-CM

## 2020-05-12 DIAGNOSIS — Z11.4 SCREENING FOR HIV (HUMAN IMMUNODEFICIENCY VIRUS): ICD-10-CM

## 2020-05-12 DIAGNOSIS — F41.9 ANXIETY: ICD-10-CM

## 2020-05-12 DIAGNOSIS — Z11.59 NEED FOR HEPATITIS C SCREENING TEST: ICD-10-CM

## 2020-05-12 DIAGNOSIS — Z00.00 PHYSICAL EXAM, ANNUAL: ICD-10-CM

## 2020-05-12 DIAGNOSIS — Z13.1 SCREENING FOR DIABETES MELLITUS: ICD-10-CM

## 2020-05-12 DIAGNOSIS — Z13.220 SCREENING FOR LIPID DISORDERS: ICD-10-CM

## 2020-05-12 DIAGNOSIS — F32.5 MAJOR DEPRESSIVE DISORDER WITH SINGLE EPISODE, IN FULL REMISSION (HCC): Primary | ICD-10-CM

## 2020-05-12 DIAGNOSIS — J30.89 ENVIRONMENTAL AND SEASONAL ALLERGIES: ICD-10-CM

## 2020-05-12 DIAGNOSIS — Z23 ENCOUNTER FOR IMMUNIZATION: ICD-10-CM

## 2020-05-12 PROBLEM — H53.9 ABNORMAL VISION OF BOTH EYES: Status: RESOLVED | Noted: 2018-08-10 | Resolved: 2020-05-12

## 2020-05-12 PROBLEM — M18.12 PRIMARY OSTEOARTHRITIS OF FIRST CARPOMETACARPAL JOINT OF LEFT HAND: Status: RESOLVED | Noted: 2019-10-14 | Resolved: 2020-05-12

## 2020-05-12 PROBLEM — R25.2 LEG CRAMPING: Status: RESOLVED | Noted: 2018-09-05 | Resolved: 2020-05-12

## 2020-05-12 PROBLEM — M19.049 CMC ARTHRITIS: Status: RESOLVED | Noted: 2018-08-20 | Resolved: 2020-05-12

## 2020-05-12 LAB
25(OH)D3 SERPL-MCNC: 14.2 NG/ML (ref 30–100)
ALBUMIN SERPL BCP-MCNC: 4 G/DL (ref 3.5–5)
ALP SERPL-CCNC: 70 U/L (ref 46–116)
ALT SERPL W P-5'-P-CCNC: 43 U/L (ref 12–78)
ANION GAP SERPL CALCULATED.3IONS-SCNC: 3 MMOL/L (ref 4–13)
AST SERPL W P-5'-P-CCNC: 25 U/L (ref 5–45)
BILIRUB SERPL-MCNC: 0.67 MG/DL (ref 0.2–1)
BUN SERPL-MCNC: 13 MG/DL (ref 5–25)
CALCIUM SERPL-MCNC: 8.7 MG/DL (ref 8.3–10.1)
CHLORIDE SERPL-SCNC: 105 MMOL/L (ref 100–108)
CHOLEST SERPL-MCNC: 222 MG/DL (ref 50–200)
CO2 SERPL-SCNC: 29 MMOL/L (ref 21–32)
CREAT SERPL-MCNC: 0.79 MG/DL (ref 0.6–1.3)
EST. AVERAGE GLUCOSE BLD GHB EST-MCNC: 100 MG/DL
GFR SERPL CREATININE-BSD FRML MDRD: 82 ML/MIN/1.73SQ M
GLUCOSE P FAST SERPL-MCNC: 106 MG/DL (ref 65–99)
HBA1C MFR BLD: 5.1 %
HCV AB SER QL: NORMAL
HDLC SERPL-MCNC: 46 MG/DL
LDLC SERPL CALC-MCNC: 129 MG/DL (ref 0–100)
POTASSIUM SERPL-SCNC: 4.4 MMOL/L (ref 3.5–5.3)
PROT SERPL-MCNC: 7.3 G/DL (ref 6.4–8.2)
SODIUM SERPL-SCNC: 137 MMOL/L (ref 136–145)
TRIGL SERPL-MCNC: 235 MG/DL

## 2020-05-12 PROCEDURE — 99396 PREV VISIT EST AGE 40-64: CPT | Performed by: FAMILY MEDICINE

## 2020-05-12 PROCEDURE — 90715 TDAP VACCINE 7 YRS/> IM: CPT | Performed by: FAMILY MEDICINE

## 2020-05-12 PROCEDURE — 86803 HEPATITIS C AB TEST: CPT

## 2020-05-12 PROCEDURE — 90732 PPSV23 VACC 2 YRS+ SUBQ/IM: CPT | Performed by: FAMILY MEDICINE

## 2020-05-12 PROCEDURE — 82306 VITAMIN D 25 HYDROXY: CPT

## 2020-05-12 PROCEDURE — 87389 HIV-1 AG W/HIV-1&-2 AB AG IA: CPT

## 2020-05-12 PROCEDURE — 80053 COMPREHEN METABOLIC PANEL: CPT

## 2020-05-12 PROCEDURE — 90472 IMMUNIZATION ADMIN EACH ADD: CPT | Performed by: FAMILY MEDICINE

## 2020-05-12 PROCEDURE — 1036F TOBACCO NON-USER: CPT | Performed by: FAMILY MEDICINE

## 2020-05-12 PROCEDURE — 36415 COLL VENOUS BLD VENIPUNCTURE: CPT

## 2020-05-12 PROCEDURE — 80061 LIPID PANEL: CPT

## 2020-05-12 PROCEDURE — 99214 OFFICE O/P EST MOD 30 MIN: CPT | Performed by: FAMILY MEDICINE

## 2020-05-12 PROCEDURE — 83036 HEMOGLOBIN GLYCOSYLATED A1C: CPT

## 2020-05-12 PROCEDURE — 90471 IMMUNIZATION ADMIN: CPT | Performed by: FAMILY MEDICINE

## 2020-05-12 PROCEDURE — 3008F BODY MASS INDEX DOCD: CPT | Performed by: FAMILY MEDICINE

## 2020-05-12 RX ORDER — PAROXETINE HYDROCHLORIDE 20 MG/1
20 TABLET, FILM COATED ORAL DAILY
Start: 2020-05-12 | End: 2020-05-20 | Stop reason: SDUPTHER

## 2020-05-13 ENCOUNTER — TELEPHONE (OUTPATIENT)
Dept: FAMILY MEDICINE CLINIC | Facility: MEDICAL CENTER | Age: 60
End: 2020-05-13

## 2020-05-13 ENCOUNTER — HOSPITAL ENCOUNTER (OUTPATIENT)
Dept: RADIOLOGY | Facility: MEDICAL CENTER | Age: 60
Discharge: HOME | End: 2020-05-13
Payer: COMMERCIAL

## 2020-05-13 VITALS — BODY MASS INDEX: 30.3 KG/M2 | HEIGHT: 63 IN | WEIGHT: 171 LBS

## 2020-05-13 DIAGNOSIS — E55.9 VITAMIN D DEFICIENCY: Primary | ICD-10-CM

## 2020-05-13 DIAGNOSIS — Z12.31 ENCOUNTER FOR SCREENING MAMMOGRAM FOR MALIGNANT NEOPLASM OF BREAST: ICD-10-CM

## 2020-05-13 LAB — HIV 1+2 AB+HIV1 P24 AG SERPL QL IA: NORMAL

## 2020-05-13 PROCEDURE — 77067 SCR MAMMO BI INCL CAD: CPT

## 2020-05-13 PROCEDURE — 77063 BREAST TOMOSYNTHESIS BI: CPT

## 2020-05-14 RX ORDER — ERGOCALCIFEROL 1.25 MG/1
50000 CAPSULE ORAL WEEKLY
Qty: 13 CAPSULE | Refills: 1 | Status: SHIPPED | OUTPATIENT
Start: 2020-05-14 | End: 2020-11-02

## 2020-05-20 DIAGNOSIS — F41.9 ANXIETY: ICD-10-CM

## 2020-05-20 DIAGNOSIS — F32.5 MAJOR DEPRESSIVE DISORDER WITH SINGLE EPISODE, IN FULL REMISSION (HCC): ICD-10-CM

## 2020-05-21 RX ORDER — PAROXETINE HYDROCHLORIDE 20 MG/1
20 TABLET, FILM COATED ORAL DAILY
Qty: 90 TABLET | Refills: 1
Start: 2020-05-21 | End: 2020-05-26 | Stop reason: SDUPTHER

## 2020-05-26 DIAGNOSIS — F32.5 MAJOR DEPRESSIVE DISORDER WITH SINGLE EPISODE, IN FULL REMISSION (HCC): ICD-10-CM

## 2020-05-26 DIAGNOSIS — F41.9 ANXIETY: ICD-10-CM

## 2020-05-26 RX ORDER — PAROXETINE HYDROCHLORIDE 20 MG/1
20 TABLET, FILM COATED ORAL DAILY
Qty: 90 TABLET | Refills: 0
Start: 2020-05-26 | End: 2020-11-12 | Stop reason: ALTCHOICE

## 2020-06-22 ENCOUNTER — OFFICE VISIT (OUTPATIENT)
Dept: CARDIOLOGY CLINIC | Facility: CLINIC | Age: 60
End: 2020-06-22
Payer: COMMERCIAL

## 2020-06-22 ENCOUNTER — CLINICAL SUPPORT (OUTPATIENT)
Dept: CARDIOLOGY CLINIC | Facility: CLINIC | Age: 60
End: 2020-06-22
Payer: COMMERCIAL

## 2020-06-22 ENCOUNTER — TELEPHONE (OUTPATIENT)
Dept: CARDIOLOGY CLINIC | Facility: CLINIC | Age: 60
End: 2020-06-22

## 2020-06-22 VITALS
BODY MASS INDEX: 30.56 KG/M2 | DIASTOLIC BLOOD PRESSURE: 80 MMHG | SYSTOLIC BLOOD PRESSURE: 102 MMHG | WEIGHT: 172.5 LBS | TEMPERATURE: 98 F | HEART RATE: 73 BPM | HEIGHT: 63 IN

## 2020-06-22 DIAGNOSIS — E78.1 PURE HYPERTRIGLYCERIDEMIA: ICD-10-CM

## 2020-06-22 DIAGNOSIS — R00.2 HEART PALPITATIONS: ICD-10-CM

## 2020-06-22 DIAGNOSIS — I20.1 PRINZMETAL'S ANGINA (HCC): Primary | ICD-10-CM

## 2020-06-22 DIAGNOSIS — R06.00 DYSPNEA ON EXERTION: ICD-10-CM

## 2020-06-22 PROBLEM — J30.89 ENVIRONMENTAL AND SEASONAL ALLERGIES: Status: RESOLVED | Noted: 2017-05-10 | Resolved: 2020-06-22

## 2020-06-22 PROBLEM — R06.09 DYSPNEA ON EXERTION: Status: ACTIVE | Noted: 2020-06-22

## 2020-06-22 PROCEDURE — 99211 OFF/OP EST MAY X REQ PHY/QHP: CPT

## 2020-06-22 PROCEDURE — 0296T PR EXT ECG > 48HR TO 21 DAY RCRD W/CONECT INTL RCRD: CPT | Performed by: INTERNAL MEDICINE

## 2020-06-22 PROCEDURE — 3008F BODY MASS INDEX DOCD: CPT | Performed by: FAMILY MEDICINE

## 2020-06-22 PROCEDURE — 99244 OFF/OP CNSLTJ NEW/EST MOD 40: CPT | Performed by: INTERNAL MEDICINE

## 2020-06-22 PROCEDURE — 93000 ELECTROCARDIOGRAM COMPLETE: CPT | Performed by: INTERNAL MEDICINE

## 2020-07-14 ENCOUNTER — TELEMEDICINE (OUTPATIENT)
Dept: FAMILY MEDICINE CLINIC | Facility: MEDICAL CENTER | Age: 60
End: 2020-07-14
Payer: COMMERCIAL

## 2020-07-14 DIAGNOSIS — Z20.828 EXPOSURE TO SARS-ASSOCIATED CORONAVIRUS: ICD-10-CM

## 2020-07-14 DIAGNOSIS — R05.9 COUGH: Primary | ICD-10-CM

## 2020-07-14 PROCEDURE — 99214 OFFICE O/P EST MOD 30 MIN: CPT | Performed by: FAMILY MEDICINE

## 2020-07-14 NOTE — PROGRESS NOTES
COVID-19 Virtual Visit     Assessment/Plan:    Problem List Items Addressed This Visit     None      Visit Diagnoses     Cough    -  Primary    Exposure to SARS-associated coronavirus        Relevant Orders    MISC COVID-19 TEST- Collected at [facility] or [facility]          Symptoms suspicious for possible COVID-19 infection  I recommended COVID PCR testing via nasal swab and patient was agreeable  She will go to [facility] for testing  She was instructed to isolate until results are available which currently can take about 10+ days  Patient acknowledged understanding  She is to call the office or go to the ER if her symptoms worsen or new symptoms developed  Follow-up in one week if symptoms persist or sooner if needed  This virtual check-in was done via [employer]  Disposition:      I referred Lloyd to one of our centralized sites for a COVID-19 swab    I spent n/a minutes directly with the patient during this visit   Based on HPI elements and A&P decision making I would normally code this a [id number]  Encounter provider Luz Morales DO    Provider located at [address] [place] [id number]    Recent Visits  No visits were found meeting these conditions  Showing recent visits within past 7 days and meeting all other requirements     Today's Visits  Date Type Provider Dept   07/14/20 Telemedicine Luz Morales DO [facility]   Showing today's visits and meeting all other requirements     Future Appointments  Date Type Provider Dept   07/14/20 Telemedicine Luz Morales DO [facility]   Showing future appointments within next 150 days and meeting all other requirements        Patient agrees to participate in a virtual check in via telephone or video visit instead of presenting to the office to address urgent/immediate medical needs  Patient is aware this is a billable service         After connecting through aihuishou, the patient was identified by name and date of birth  Martha Ferrera was informed that this was a telemedicine visit and that the exam was being conducted confidentially over secure lines  My office door was closed  No one else was in the room  Martha Ferrera acknowledged consent and understanding of privacy and security of the telemedicine visit  I informed the patient that I have reviewed her record in Epic and presented the opportunity for her to ask any questions regarding the visit today  The patient agreed to participate  Subjective  Martha Ferrera is a 61 y o  female who is concerned about COVID-19  She reports fever, cough, headache, sore throat, diarrhea and vomiting  She has not experienced fatigue She has not had contact with a person who is under investigation for or who is positive for COVID-19 within the last 14 days  She has not been hospitalized recently for fever and/or lower respiratory symptoms  Symptoms started one week ago  Past Medical History:   Diagnosis Date    Abnormal Pap smear of cervix     BRCA1 negative     Breast cancer ([facility] ) 02/06/2009    right breast    Cancer ([facility] )     Right breast cancer R lymph node bx neg  Last assessed: 6/4/13    Carpal tunnel syndrome     Chronic kidney disease     History of radiation therapy 03/2009    Kidney stone     Last assessed: 1/7/16    Osteoporosis     Sleep apnea        Past Surgical History:   Procedure Laterality Date    BLADDER SUSPENSION      BREAST BIOPSY Right 2009    BREAST LUMPECTOMY Right 02/06/2009    malignant    CHOLECYSTECTOMY      COLONOSCOPY      HAND SURGERY      LITHOTRIPSY Left     NV COLONOSCOPY FLX DX W/COLLJ SPEC WHEN PFRMD N/A 7/10/2017    Procedure: COLONOSCOPY;  Surgeon: Delfina Cannon MD;  [facility];   Service: Gastroenterology    NV EXC SKIN BENIG 0 6-1 CM TRUNK,ARM,LEG Left 10/23/2019    Procedure: UPPER EXTREMITY LESION/MASS EXCISION BIOPSY;  Surgeon: Libby Almazan Elizabeth Troncoso MD;  [facility];  Service: Orthopedics    UT REPAIR INTERCARP/CARP-METACARP JT Left 10/23/2019    Procedure: THUMB SUSPENSIONPLASTY;  Surgeon: Oscar Barbosa MD;  [facility];  Service: Orthopedics    UT WRIST Rickey FITZPATRICK Left 10/3/2018    Procedure: ENDOSCOPIC CARPAL TUNNEL RELEASE;  Surgeon: Oscar Barbosa MD;  [facility];  Service: Orthopedics    SENTINEL LYMPH NODE BIOPSY      WRIST SURGERY         Current Outpatient Medications   Medication Sig Dispense Refill    alendronate (FOSAMAX) 70 mg tablet TAKE 1 TABLET BY MOUTH ONCE A WEEK 13 tablet 0    ergocalciferol (VITAMIN D2) 50,000 units Take 1 capsule (50,000 Units total) by mouth once a week 13 capsule 1    fluticasone (FLONASE) 50 mcg/act nasal spray 1 spray into each nostril daily 3 Bottle 1    montelukast (SINGULAIR) 10 mg tablet Take 1 tablet (10 mg total) by mouth daily at bedtime 90 tablet 1    PARoxetine (PAXIL) 20 mg tablet Take 1 tablet (20 mg total) by mouth daily 90 tablet 0     No current facility-administered medications for this visit  No Known Allergies    Review of Systems   Constitutional: Negative for fever  Respiratory: Negative for shortness of breath  Cardiovascular: Negative for chest pain  Video Exam    There were no vitals filed for this visit  Lloyd appears healthy, alert, no distress  Physical Exam   Constitutional: She appears well-developed and well-nourished  No distress  Pulmonary/Chest: No respiratory distress  Psychiatric: She has a normal mood and affect  Her behavior is normal         VIRTUAL VISIT DISCLAIMER    Lloyd Camejo acknowledges that she has consented to an online visit or consultation   She understands that the online visit is based solely on information provided by her, and that, in the absence of a face-to-face physical evaluation by the physician, the diagnosis she receives is both limited and provisional in terms of accuracy and completeness  This is not intended to replace a full medical face-to-face evaluation by the physician  Lloyd Camejo understands and accepts these terms

## 2020-07-15 DIAGNOSIS — Z20.828 EXPOSURE TO SARS-ASSOCIATED CORONAVIRUS: ICD-10-CM

## 2020-07-15 PROCEDURE — U0003 INFECTIOUS AGENT DETECTION BY NUCLEIC ACID (DNA OR RNA); SEVERE ACUTE RESPIRATORY SYNDROME CORONAVIRUS 2 (SARS-COV-2) (CORONAVIRUS DISEASE [COVID-19]), AMPLIFIED PROBE TECHNIQUE, MAKING USE OF HIGH THROUGHPUT TECHNOLOGIES AS DESCRIBED BY CMS-2020-01-R: HCPCS | Performed by: OBSTETRICS & GYNECOLOGY

## 2020-07-20 ENCOUNTER — TELEPHONE (OUTPATIENT)
Dept: FAMILY MEDICINE CLINIC | Facility: MEDICAL CENTER | Age: 60
End: 2020-07-20

## 2020-07-20 LAB — SARS-COV-2 RNA SPEC QL NAA+PROBE: NOT DETECTED

## 2020-07-20 NOTE — TELEPHONE ENCOUNTER
Due to the time of year I would recommend she try allergy medication such as over-the-counter Claritin and over-the-counter Flonase  Claritin is a pill she will take every day  Flonase is a nose spray she will take every day as well

## 2020-07-20 NOTE — TELEPHONE ENCOUNTER
Patient wants to know what you recommend in light of the negative covid testing  Has had the dry cough and headaches that make her vomit for 2 weeks  Aware you are out this week, and she is on vacation next week  Says its not urgent

## 2020-07-22 ENCOUNTER — HOSPITAL ENCOUNTER (OUTPATIENT)
Dept: NON INVASIVE DIAGNOSTICS | Facility: HOSPITAL | Age: 60
Discharge: HOME | End: 2020-07-22
Attending: INTERNAL MEDICINE
Payer: COMMERCIAL

## 2020-07-22 DIAGNOSIS — I20.1 PRINZMETAL'S ANGINA (HCC): ICD-10-CM

## 2020-07-22 LAB
CHEST PAIN STATEMENT: NORMAL
MAX DIASTOLIC BP: 70 MMHG
MAX HEART RATE: 153 BPM
MAX PREDICTED HEART RATE: 161 BPM
MAX. SYSTOLIC BP: 158 MMHG
PROTOCOL NAME: NORMAL
REASON FOR TERMINATION: NORMAL
TARGET HR FORMULA: NORMAL
TEST INDICATION: NORMAL
TIME IN EXERCISE PHASE: NORMAL

## 2020-07-22 PROCEDURE — 93350 STRESS TTE ONLY: CPT

## 2020-07-22 PROCEDURE — 93351 STRESS TTE COMPLETE: CPT | Performed by: INTERNAL MEDICINE

## 2020-08-12 DIAGNOSIS — J30.89 ENVIRONMENTAL AND SEASONAL ALLERGIES: ICD-10-CM

## 2020-08-13 RX ORDER — MONTELUKAST SODIUM 10 MG/1
10 TABLET ORAL
Qty: 90 TABLET | Refills: 0 | Status: SHIPPED | OUTPATIENT
Start: 2020-08-13 | End: 2021-03-28

## 2020-10-05 DIAGNOSIS — M81.0 OSTEOPOROSIS WITHOUT CURRENT PATHOLOGICAL FRACTURE, UNSPECIFIED OSTEOPOROSIS TYPE: ICD-10-CM

## 2020-10-06 RX ORDER — ALENDRONATE SODIUM 70 MG/1
70 TABLET ORAL WEEKLY
Qty: 13 TABLET | Refills: 0 | Status: SHIPPED | OUTPATIENT
Start: 2020-10-06 | End: 2021-01-05

## 2020-10-31 DIAGNOSIS — E55.9 VITAMIN D DEFICIENCY: ICD-10-CM

## 2020-11-02 DIAGNOSIS — E55.9 VITAMIN D DEFICIENCY: ICD-10-CM

## 2020-11-02 RX ORDER — ERGOCALCIFEROL 1.25 MG/1
CAPSULE ORAL
Qty: 4 CAPSULE | Refills: 0 | Status: SHIPPED | OUTPATIENT
Start: 2020-11-02 | End: 2020-11-02 | Stop reason: SDUPTHER

## 2020-11-02 RX ORDER — ERGOCALCIFEROL 1.25 MG/1
50000 CAPSULE ORAL WEEKLY
Qty: 4 CAPSULE | Refills: 0 | Status: SHIPPED | OUTPATIENT
Start: 2020-11-02 | End: 2021-10-25 | Stop reason: SDUPTHER

## 2020-11-03 ENCOUNTER — TELEPHONE (OUTPATIENT)
Dept: GASTROENTEROLOGY | Facility: AMBULARY SURGERY CENTER | Age: 60
End: 2020-11-03

## 2020-11-12 ENCOUNTER — OFFICE VISIT (OUTPATIENT)
Dept: FAMILY MEDICINE CLINIC | Facility: MEDICAL CENTER | Age: 60
End: 2020-11-12
Payer: COMMERCIAL

## 2020-11-12 VITALS
DIASTOLIC BLOOD PRESSURE: 76 MMHG | TEMPERATURE: 98.2 F | HEIGHT: 63 IN | WEIGHT: 173 LBS | BODY MASS INDEX: 30.65 KG/M2 | SYSTOLIC BLOOD PRESSURE: 120 MMHG | HEART RATE: 80 BPM

## 2020-11-12 DIAGNOSIS — Z23 IMMUNIZATION DUE: ICD-10-CM

## 2020-11-12 DIAGNOSIS — J30.89 ENVIRONMENTAL AND SEASONAL ALLERGIES: ICD-10-CM

## 2020-11-12 DIAGNOSIS — E55.9 VITAMIN D DEFICIENCY: ICD-10-CM

## 2020-11-12 DIAGNOSIS — M81.0 AGE-RELATED OSTEOPOROSIS WITHOUT CURRENT PATHOLOGICAL FRACTURE: Primary | ICD-10-CM

## 2020-11-12 PROBLEM — R06.09 DYSPNEA ON EXERTION: Status: RESOLVED | Noted: 2020-06-22 | Resolved: 2020-11-12

## 2020-11-12 PROBLEM — R00.2 HEART PALPITATIONS: Status: RESOLVED | Noted: 2020-06-22 | Resolved: 2020-11-12

## 2020-11-12 PROBLEM — F41.9 ANXIETY: Status: RESOLVED | Noted: 2020-05-12 | Resolved: 2020-11-12

## 2020-11-12 PROBLEM — R06.00 DYSPNEA ON EXERTION: Status: RESOLVED | Noted: 2020-06-22 | Resolved: 2020-11-12

## 2020-11-12 PROBLEM — F32.5 MAJOR DEPRESSIVE DISORDER WITH SINGLE EPISODE, IN FULL REMISSION (HCC): Status: RESOLVED | Noted: 2018-09-05 | Resolved: 2020-11-12

## 2020-11-12 PROCEDURE — 3725F SCREEN DEPRESSION PERFORMED: CPT | Performed by: FAMILY MEDICINE

## 2020-11-12 PROCEDURE — 1036F TOBACCO NON-USER: CPT | Performed by: FAMILY MEDICINE

## 2020-11-12 PROCEDURE — 99214 OFFICE O/P EST MOD 30 MIN: CPT | Performed by: FAMILY MEDICINE

## 2020-11-12 PROCEDURE — 3008F BODY MASS INDEX DOCD: CPT | Performed by: FAMILY MEDICINE

## 2020-11-12 PROCEDURE — 90471 IMMUNIZATION ADMIN: CPT | Performed by: FAMILY MEDICINE

## 2020-11-12 PROCEDURE — 90682 RIV4 VACC RECOMBINANT DNA IM: CPT | Performed by: FAMILY MEDICINE

## 2021-01-01 DIAGNOSIS — M81.0 OSTEOPOROSIS WITHOUT CURRENT PATHOLOGICAL FRACTURE, UNSPECIFIED OSTEOPOROSIS TYPE: ICD-10-CM

## 2021-01-05 RX ORDER — ALENDRONATE SODIUM 70 MG/1
TABLET ORAL
Qty: 13 TABLET | Refills: 0 | Status: SHIPPED | OUTPATIENT
Start: 2021-01-05 | End: 2021-03-28

## 2021-03-10 DIAGNOSIS — Z23 ENCOUNTER FOR IMMUNIZATION: ICD-10-CM

## 2021-03-24 ENCOUNTER — IMMUNIZATIONS (OUTPATIENT)
Dept: FAMILY MEDICINE CLINIC | Facility: HOSPITAL | Age: 61
End: 2021-03-24

## 2021-03-24 DIAGNOSIS — Z23 ENCOUNTER FOR IMMUNIZATION: Primary | ICD-10-CM

## 2021-03-24 PROCEDURE — 0001A SARS-COV-2 / COVID-19 MRNA VACCINE (PFIZER-BIONTECH) 30 MCG: CPT

## 2021-03-24 PROCEDURE — 91300 SARS-COV-2 / COVID-19 MRNA VACCINE (PFIZER-BIONTECH) 30 MCG: CPT

## 2021-03-27 DIAGNOSIS — M81.0 OSTEOPOROSIS WITHOUT CURRENT PATHOLOGICAL FRACTURE, UNSPECIFIED OSTEOPOROSIS TYPE: ICD-10-CM

## 2021-03-27 DIAGNOSIS — J30.89 ENVIRONMENTAL AND SEASONAL ALLERGIES: ICD-10-CM

## 2021-03-28 RX ORDER — ALENDRONATE SODIUM 70 MG/1
TABLET ORAL
Qty: 4 TABLET | Refills: 1 | Status: SHIPPED | OUTPATIENT
Start: 2021-03-28 | End: 2021-09-07

## 2021-03-28 RX ORDER — MONTELUKAST SODIUM 10 MG/1
TABLET ORAL
Qty: 30 TABLET | Refills: 1 | Status: SHIPPED | OUTPATIENT
Start: 2021-03-28 | End: 2021-04-02 | Stop reason: SDUPTHER

## 2021-04-02 DIAGNOSIS — J30.89 ENVIRONMENTAL AND SEASONAL ALLERGIES: ICD-10-CM

## 2021-04-03 RX ORDER — MONTELUKAST SODIUM 10 MG/1
10 TABLET ORAL
Qty: 30 TABLET | Refills: 0 | Status: SHIPPED | OUTPATIENT
Start: 2021-04-03 | End: 2022-03-02 | Stop reason: SDUPTHER

## 2021-04-16 ENCOUNTER — IMMUNIZATIONS (OUTPATIENT)
Dept: FAMILY MEDICINE CLINIC | Facility: HOSPITAL | Age: 61
End: 2021-04-16

## 2021-04-16 DIAGNOSIS — Z23 ENCOUNTER FOR IMMUNIZATION: Primary | ICD-10-CM

## 2021-04-16 PROCEDURE — 91300 SARS-COV-2 / COVID-19 MRNA VACCINE (PFIZER-BIONTECH) 30 MCG: CPT

## 2021-04-16 PROCEDURE — 0002A SARS-COV-2 / COVID-19 MRNA VACCINE (PFIZER-BIONTECH) 30 MCG: CPT

## 2021-05-07 ENCOUNTER — RA CDI HCC (OUTPATIENT)
Dept: OTHER | Facility: HOSPITAL | Age: 61
End: 2021-05-07

## 2021-05-07 NOTE — PROGRESS NOTES
[facility]  coding opportunities             Chart reviewed, (number of) suggestions sent to provider: 2     Problem listed updated  Provider Accepted, (number of) suggestions accepted: 0     Provider Rejected Suggestions for: I20 1,F32 5     Patients insurance company: [employer] (Med[employer]vantage and [employer])           Re: Lenora Warren    Based on clinical documentation indicated in the medical record, it appears that the patient may have the following conditions:    I20 1 - Prinzmetal's angina (Cardiology progress note dated 6/22/2020)    F32 5 - Major depressive disorder, single episode, in full remission    If this is correct, please document, assess, and code at your next visit on 5/14/2021    [place] [id number]  coding opportunities             Chart reviewed, (number of) suggestions sent to provider: 2           Patients insurance company: [employer] (Medicare Advantage and [employer])

## 2021-05-10 ENCOUNTER — APPOINTMENT (OUTPATIENT)
Dept: LAB | Facility: MEDICAL CENTER | Age: 61
End: 2021-05-10
Payer: COMMERCIAL

## 2021-05-10 DIAGNOSIS — E55.9 VITAMIN D DEFICIENCY: ICD-10-CM

## 2021-05-10 LAB — 25(OH)D3 SERPL-MCNC: 17.2 NG/ML (ref 30–100)

## 2021-05-10 PROCEDURE — 36415 COLL VENOUS BLD VENIPUNCTURE: CPT

## 2021-05-10 PROCEDURE — 82306 VITAMIN D 25 HYDROXY: CPT

## 2021-05-14 ENCOUNTER — OFFICE VISIT (OUTPATIENT)
Dept: FAMILY MEDICINE CLINIC | Facility: MEDICAL CENTER | Age: 61
End: 2021-05-14
Payer: COMMERCIAL

## 2021-05-14 VITALS
RESPIRATION RATE: 16 BRPM | SYSTOLIC BLOOD PRESSURE: 118 MMHG | TEMPERATURE: 97.8 F | BODY MASS INDEX: 30.65 KG/M2 | DIASTOLIC BLOOD PRESSURE: 73 MMHG | WEIGHT: 173 LBS | HEIGHT: 63 IN | HEART RATE: 68 BPM

## 2021-05-14 DIAGNOSIS — Z12.31 VISIT FOR SCREENING MAMMOGRAM: ICD-10-CM

## 2021-05-14 DIAGNOSIS — E55.9 VITAMIN D DEFICIENCY: ICD-10-CM

## 2021-05-14 DIAGNOSIS — M81.0 AGE-RELATED OSTEOPOROSIS WITHOUT CURRENT PATHOLOGICAL FRACTURE: Primary | ICD-10-CM

## 2021-05-14 DIAGNOSIS — Z13.220 SCREENING FOR LIPID DISORDERS: ICD-10-CM

## 2021-05-14 DIAGNOSIS — Z13.1 SCREENING FOR DIABETES MELLITUS: ICD-10-CM

## 2021-05-14 DIAGNOSIS — Z13.29 SCREENING FOR THYROID DISORDER: ICD-10-CM

## 2021-05-14 DIAGNOSIS — J30.89 ENVIRONMENTAL AND SEASONAL ALLERGIES: ICD-10-CM

## 2021-05-14 DIAGNOSIS — Z00.00 PHYSICAL EXAM, ANNUAL: ICD-10-CM

## 2021-05-14 DIAGNOSIS — E66.9 OBESITY (BMI 30-39.9): ICD-10-CM

## 2021-05-14 PROCEDURE — 3725F SCREEN DEPRESSION PERFORMED: CPT | Performed by: FAMILY MEDICINE

## 2021-05-14 PROCEDURE — 99396 PREV VISIT EST AGE 40-64: CPT | Performed by: FAMILY MEDICINE

## 2021-05-14 PROCEDURE — 99214 OFFICE O/P EST MOD 30 MIN: CPT | Performed by: FAMILY MEDICINE

## 2021-05-14 NOTE — PROGRESS NOTES
Assessment/Plan:       Diagnoses and all orders for this visit:    Physical exam, annual  22-year-old female presenting for a physical exam     Continue regular follow-up with Gynecology  Colonoscopy up-to-date  Screening for lipid disorders  -     Lipid panel; Future  -     LDL cholesterol, direct; Future  Screening for diabetes mellitus  -     Comprehensive metabolic panel; Future  -     Hemoglobin A1C; Future  Screening for thyroid disorder  -     TSH, 3rd generation; Future  -     T4, free; Future  Check labs to screen for lipid disorders, diabetes and thyroid disorder  Obesity (BMI 30-39 9)  -     Ambulatory referral to Weight Management; Future  BMI Counseling: Body mass index is 30 65 kg/m²  The BMI is above normal  Nutrition recommendations include decreasing overall calorie intake  Exercise recommendations include moderate aerobic physical activity for 150 minutes/week  Patient referred to weight management due to patient being obese  Visit for screening mammogram  -     Mammo screening bilateral w 3d & cad; Future  Order provided for a mammogram       Follow-up in six months or sooner if needed  Subjective:      Patient ID: Melvi Vasquez is a 61 y o  female  Patient presents for a physical exam     She states she has been eating healthy and staying physically active but finds it difficult to lose weight  No tobacco use  Occasional alcohol use  No drug use  Does have a gynecologist     She is due for a mammogram     Vaccines are up-to-date  Agreeable to labs            The following portions of the patient's history were reviewed and updated as appropriate: She  has a past medical history of Abnormal Pap smear of cervix, Anxiety (5/12/2020), BRCA1 negative, Breast cancer ([facility] [address] ) (02/06/2009), Cancer [facility]), Carpal tunnel syndrome, Chronic kidney disease, History of radiation therapy (03/2009), Kidney stone, Major depressive disorder with single episode, in full remission ([facility] ) (9/5/2018), Osteoporosis, and Sleep apnea  She   Patient Active Problem List    Diagnosis Date Noted    Obstructive sleep apnea 05/23/2017    Environmental and seasonal allergies 05/10/2017    Hyperlipidemia 05/10/2017    Osteoporosis 05/10/2017    Vitamin D deficiency 05/10/2017    Prinzmetal's angina ([facility] ) 09/04/2012     She  has a past surgical history that includes Lithotripsy (Left); Cholecystectomy; Bladder suspension; pr colonoscopy flx dx w/collj spec when pfrmd (N/A, 7/10/2017); Colonoscopy; [place] lymph node biopsy; pr wrist arthroscop,release xvers lig (Left, 10/3/2018); Breast lumpectomy (Right, 02/06/2009); pr repair intercarp/carp-metacarp jt (Left, 10/23/2019); pr exc skin benig 0 6-1 cm trunk,arm,leg (Left, 10/23/2019); Hand surgery; Wrist surgery; and Breast biopsy (Right, 2009)  Her family history includes Breast cancer in her cousin; Breast cancer (age of onset: 36) in her maternal aunt; Breast cancer (age of onset: 40) in her sister; Cancer in her family; Colon polyps in her sister; Diabetes in her sister; Hypertension in her father and mother; No Known Problems in her daughter, daughter, maternal grandfather, maternal grandmother, paternal grandfather, and paternal grandmother; Prostate cancer (age of onset: 79) in her father; Skin cancer in her father, mother, and sister; Thyroid disease in her family; Uterine cancer (age of onset: 48) in her sister  She  reports that she has never smoked  She has never used smokeless tobacco  She reports current alcohol use  She reports that she does not use drugs    Current Outpatient Medications   Medication Sig Dispense Refill    alendronate (FOSAMAX) 70 mg tablet Take 1 tablet by mouth once a week 4 tablet 1    ergocalciferol (VITAMIN D2) 50,000 units Take 1 capsule (50,000 Units total) by mouth once a week 4 capsule 0    fluticasone (FLONASE) 50 mcg/act nasal spray 1 spray into each nostril daily 3 Bottle 1    montelukast (SINGULAIR) 10 mg tablet Take 1 tablet (10 mg total) by mouth daily at bedtime 30 tablet 0     No current facility-administered medications for this visit  Current Outpatient Medications on File Prior to Visit   Medication Sig    alendronate (FOSAMAX) 70 mg tablet Take 1 tablet by mouth once a week    ergocalciferol (VITAMIN D2) 50,000 units Take 1 capsule (50,000 Units total) by mouth once a week    fluticasone (FLONASE) 50 mcg/act nasal spray 1 spray into each nostril daily    montelukast (SINGULAIR) 10 mg tablet Take 1 tablet (10 mg total) by mouth daily at bedtime     No current facility-administered medications on file prior to visit  She has No Known Allergies       Review of Systems   Constitutional: Negative for fever  Respiratory: Negative for shortness of breath  Cardiovascular: Negative for chest pain  Gastrointestinal: Negative for abdominal pain and blood in stool  Genitourinary: Negative for dysuria and hematuria  Musculoskeletal: Negative for arthralgias and myalgias  Skin: Negative for rash  Neurological: Negative for headaches  Objective:      /73 (BP Location: Left arm, Patient Position: Sitting, Cuff Size: Adult)   Pulse 68   Temp 97 8 °F (36 6 °C)   Resp 16   Ht 5' 3" (1 6 m)   Wt 78 5 kg (173 lb)   BMI 30 65 kg/m²          Physical Exam  Constitutional:       General: She is not in acute distress  Appearance: She is obese  She is not ill-appearing  Comments: No nose, mouth or throat complaints  Nose, mouth and throat not examined  Mask in place  COVID-19 pandemic  HENT:      Head: Normocephalic and atraumatic  Right Ear: Tympanic membrane, ear canal and external ear normal       Left Ear: Tympanic membrane, ear canal and external ear normal    Eyes:      General: Lids are normal       Conjunctiva/sclera: Conjunctivae normal       Pupils: Pupils are equal, round, and reactive to light     Neck:      Trachea: Trachea normal    Cardiovascular: Rate and Rhythm: Normal rate and regular rhythm  Heart sounds: S1 normal and S2 normal  No murmur  Pulmonary:      Effort: Pulmonary effort is normal       Breath sounds: Normal breath sounds  Abdominal:      General: Bowel sounds are normal       Palpations: Abdomen is soft  Tenderness: There is no abdominal tenderness  Musculoskeletal: Normal range of motion  Skin:     General: Skin is warm and dry  Neurological:      Mental Status: She is alert and oriented to person, place, and time  Psychiatric:         Speech: Speech normal          Behavior: Behavior normal          Thought Content:  Thought content normal

## 2021-05-14 NOTE — PROGRESS NOTES
Assessment/Plan:       Diagnoses and all orders for this visit:    Age-related osteoporosis without current pathological fracture  -     DXA bone density spine hip and pelvis; Future  Osteoporosis on previous DXA scan  Patient is due for repeat DEXA scan to assess for improvement since initiating therapy with bisphosphonate  She is agreeable to the test   Order provided  Continue Fosamax for now  Vitamin D deficiency  -     Vitamin D 25 hydroxy; Future  Vitamin-D remains low on recent labs  Continue high dose vitamin-D  Check vitamin-D level in six months  Environmental and seasonal allergies  Allergies are well controlled  Continue Singulair and Flonase  Follow-up in six months or sooner if needed  Subjective:      Patient ID: Fransisca Andino is a 61 y o  female  Patient presents for follow-up  She has osteoporosis  Treatment consists of Fosamax once weekly  Tolerating medication well  She has vitamin-D deficiency  Treatment consists of vitamin-D once weekly  Tolerating medication well  Did have labs recently to check her vitamin-D level  She has environmental and seasonal allergies  Currently on Singulair daily and Flonase as needed  Medications work well to keep her allergy symptoms controlled  The following portions of the patient's history were reviewed and updated as appropriate: She  has a past medical history of Abnormal Pap smear of cervix, Anxiety (5/12/2020), BRCA1 negative, Breast cancer ([facility] [address] ) (02/06/2009), Cancer [facility]), Carpal tunnel syndrome, Chronic kidney disease, History of radiation therapy (03/2009), Kidney stone, Major depressive disorder with single episode, in full remission ([facility] ) (9/5/2018), Osteoporosis, and Sleep apnea    She   Patient Active Problem List    Diagnosis Date Noted    Obstructive sleep apnea 05/23/2017    Environmental and seasonal allergies 05/10/2017    Hyperlipidemia 05/10/2017    Osteoporosis 05/10/2017    Vitamin D deficiency 05/10/2017    Prinzmetal's angina ([facility] ) 09/04/2012     She  has a past surgical history that includes Lithotripsy (Left); Cholecystectomy; Bladder suspension; pr colonoscopy flx dx w/collj spec when pfrmd (N/A, 7/10/2017); Colonoscopy; [place] lymph node biopsy; pr wrist arthroscop,release xvers lig (Left, 10/3/2018); Breast lumpectomy (Right, 02/06/2009); pr repair intercarp/carp-metacarp jt (Left, 10/23/2019); pr exc skin benig 0 6-1 cm trunk,arm,leg (Left, 10/23/2019); Hand surgery; Wrist surgery; and Breast biopsy (Right, 2009)  Her family history includes Breast cancer in her cousin; Breast cancer (age of onset: 36) in her maternal aunt; Breast cancer (age of onset: 40) in her sister; Cancer in her family; Colon polyps in her sister; Diabetes in her sister; Hypertension in her father and mother; No Known Problems in her daughter, daughter, maternal grandfather, maternal grandmother, paternal grandfather, and paternal grandmother; Prostate cancer (age of onset: 79) in her father; Skin cancer in her father, mother, and sister; Thyroid disease in her family; Uterine cancer (age of onset: 48) in her sister  She  reports that she has never smoked  She has never used smokeless tobacco  She reports current alcohol use  She reports that she does not use drugs  Current Outpatient Medications   Medication Sig Dispense Refill    alendronate (FOSAMAX) 70 mg tablet Take 1 tablet by mouth once a week 4 tablet 1    ergocalciferol (VITAMIN D2) 50,000 units Take 1 capsule (50,000 Units total) by mouth once a week 4 capsule 0    fluticasone (FLONASE) 50 mcg/act nasal spray 1 spray into each nostril daily 3 Bottle 1    montelukast (SINGULAIR) 10 mg tablet Take 1 tablet (10 mg total) by mouth daily at bedtime 30 tablet 0     No current facility-administered medications for this visit        Current Outpatient Medications on File Prior to Visit   Medication Sig    alendronate (FOSAMAX) 70 mg tablet Take 1 tablet by mouth once a week    ergocalciferol (VITAMIN D2) 50,000 units Take 1 capsule (50,000 Units total) by mouth once a week    fluticasone (FLONASE) 50 mcg/act nasal spray 1 spray into each nostril daily    montelukast (SINGULAIR) 10 mg tablet Take 1 tablet (10 mg total) by mouth daily at bedtime     No current facility-administered medications on file prior to visit  She has No Known Allergies       Review of Systems   Constitutional: Negative for fever  Respiratory: Negative for shortness of breath  Cardiovascular: Negative for chest pain  Gastrointestinal: Negative for abdominal pain and blood in stool  Genitourinary: Negative for dysuria and hematuria  Musculoskeletal: Negative for arthralgias and myalgias  Skin: Negative for rash  Neurological: Negative for headaches  Objective:      /73 (BP Location: Left arm, Patient Position: Sitting, Cuff Size: Adult)   Pulse 68   Temp 97 8 °F (36 6 °C)   Resp 16   Ht 5' 3" (1 6 m)   Wt 78 5 kg (173 lb)   BMI 30 65 kg/m²          Physical Exam  Constitutional:       General: She is not in acute distress  Appearance: She is obese  She is not ill-appearing  Comments: No nose, mouth or throat complaints  Nose, mouth and throat not examined  Mask in place  COVID-19 pandemic  HENT:      Head: Normocephalic and atraumatic  Right Ear: Tympanic membrane, ear canal and external ear normal       Left Ear: Tympanic membrane, ear canal and external ear normal    Eyes:      General: Lids are normal       Conjunctiva/sclera: Conjunctivae normal       Pupils: Pupils are equal, round, and reactive to light  Neck:      Trachea: Trachea normal    Cardiovascular:      Rate and Rhythm: Normal rate and regular rhythm  Heart sounds: S1 normal and S2 normal  No murmur  Pulmonary:      Effort: Pulmonary effort is normal       Breath sounds: Normal breath sounds     Abdominal:      General: Bowel sounds are normal  Palpations: Abdomen is soft  Tenderness: There is no abdominal tenderness  Musculoskeletal: Normal range of motion  Skin:     General: Skin is warm and dry  Neurological:      Mental Status: She is alert and oriented to person, place, and time  Psychiatric:         Speech: Speech normal          Behavior: Behavior normal          Thought Content:  Thought content normal

## 2021-05-20 ENCOUNTER — CONSULT (OUTPATIENT)
Dept: BARIATRICS | Facility: CLINIC | Age: 61
End: 2021-05-20
Payer: COMMERCIAL

## 2021-05-20 VITALS
TEMPERATURE: 98.4 F | WEIGHT: 178 LBS | BODY MASS INDEX: 31.54 KG/M2 | DIASTOLIC BLOOD PRESSURE: 82 MMHG | HEART RATE: 81 BPM | HEIGHT: 63 IN | SYSTOLIC BLOOD PRESSURE: 118 MMHG

## 2021-05-20 DIAGNOSIS — E55.9 VITAMIN D DEFICIENCY: ICD-10-CM

## 2021-05-20 DIAGNOSIS — E78.2 MIXED HYPERLIPIDEMIA: ICD-10-CM

## 2021-05-20 DIAGNOSIS — E66.9 OBESITY (BMI 30-39.9): Primary | ICD-10-CM

## 2021-05-20 DIAGNOSIS — G47.33 OBSTRUCTIVE SLEEP APNEA: ICD-10-CM

## 2021-05-20 PROCEDURE — 1036F TOBACCO NON-USER: CPT | Performed by: INTERNAL MEDICINE

## 2021-05-20 PROCEDURE — 99203 OFFICE O/P NEW LOW 30 MIN: CPT | Performed by: INTERNAL MEDICINE

## 2021-05-20 NOTE — PATIENT INSTRUCTIONS
Goals:  Do not skip any meals! Food log (ie ) www myfitnesspal com,sparkpeople  com,loseit com,calorieking  com,etc  baritastic (use skinnytaste  com or smartphone dorothy [employer] for recipes)  No sugary beverages  At least 64oz of water daily  Increase physical activity by 10 minutes daily   Gradually increase physical activity to a goal of 5 days per week for 30 minutes of MODERATE intensity PLUS 2 days per week of FULL BODY resistance training (use smartphone apps CoachUp, Home Workout, etc )

## 2021-05-20 NOTE — PROGRESS NOTES
Assessment/Plan:  Carlie Coronado was seen today for consult  Diagnoses and all orders for this visit:    Obstructive sleep apnea  Pt unable to tolerate the mask/nosepiece   Weight loss can improve her sx's    Mixed hyperlipidemia  Weight loss and increasing her activity level should help improve the lipid levels  Avoid foods with trans fat, limit saturated fats and refined carbohydrates  Increase fish/omega 3 consumption    Vitamin D deficiency  Currently on 50K units       Obesity (BMI 30-39 9)  -     Ambulatory referral to Weight Management  - Discussed options of HealthyCORE-Intensive Lifestyle Intervention Program, Very Low Calorie Diet-VLCD and Conservative Program and the role of weight loss medications  - Explained the importance of making lifestyle changes first before starting any anti-obesity medications  Patient should demonstrate lifestyle changes first before anti-obesity medication can be initiated  - Patient is interested in pursuing HealthyCORE-Intensive Lifestyle Intervention Program  - Initial weight loss goal of 5-10% weight loss for improved health  - Screening labs      Goals:  Do not skip any meals! Food log (ie ) www myfitnesspal com,sparkpeople  com,loseit com,calorieking  com,etc  baritastic (use skinnytaste  com or smartphone dorothy R&V for recipes)  No sugary beverages  At least 64oz of water daily  Increase physical activity by 10 minutes daily  Gradually increase physical activity to a goal of 5 days per week for 30 minutes of MODERATE intensity PLUS 2 days per week of FULL BODY resistance training (use smartphone apps Emgo, Home Workout, etc )      30 minute visit, >50% face-to-face time spent counseling patient on nonsurgical interventions for the treatment of excess weight  Discussed in detail nonsurgical options including intensive lifestyle intervention program, very low-calorie diet program and conservative program   Discussed the role of weight loss medications    Counseled patient on diet behavior and exercise modification for weight loss  Follow up in approximately 2 weeks with Non-Surgical Dietician  Subjective:   Chief Complaint   Patient presents with    Consult     Patient is here for initial MWM consult with Dr Dlalas Rose  BMI 31 53  Patient has NEDRA but can't use CPAP  Patient ID: Niraj Mcleod  is a 61 y o  female with excess weight/obesity here to pursue weight management  Past Medical History:   Diagnosis Date    Abnormal Pap smear of cervix     Anxiety 5/12/2020    BRCA1 negative     Breast cancer ([facility] [address] ) 02/06/2009    right breast    Cancer ([facility] [address] )     Right breast cancer R lymph node bx neg  Last assessed: 6/4/13    Carpal tunnel syndrome     Chronic kidney disease     History of radiation therapy 03/2009    Kidney stone     Last assessed: 1/7/16    Major depressive disorder with single episode, in full remission ([facility] [address] ) 9/5/2018    Obesity (BMI 30 0-34  9)     Osteoporosis     Sleep apnea     Patient can't use CPAP     Past Surgical History:   Procedure Laterality Date    BLADDER SUSPENSION      BREAST BIOPSY Right 2009    BREAST LUMPECTOMY Right 02/06/2009    malignant    CHOLECYSTECTOMY      COLONOSCOPY      HAND SURGERY      LITHOTRIPSY Left     MN COLONOSCOPY FLX DX W/COLLJ SPEC WHEN PFRMD N/A 7/10/2017    Procedure: COLONOSCOPY;  Surgeon: Jorden Barboza MD;  [facility];   Service: Gastroenterology    MN EXC SKIN BENIG 0 6-1 CM TRUNK,ARM,LEG Left 10/23/2019    Procedure: UPPER EXTREMITY LESION/MASS EXCISION BIOPSY;  Surgeon: Shabbir Pruitt MD;  [facility];  Service: Orthopedics    MN REPAIR INTERCARP/CARP-METACARP JT Left 10/23/2019    Procedure: Britney Inoue;  Surgeon: Shabbir Pruitt MD;  [facility];  Service: Orthopedics    MN WRIST Peggye Peaks LIG Left 10/3/2018    Procedure: ENDOSCOPIC CARPAL TUNNEL RELEASE;  Surgeon: Shabbir Pruitt MD;  [facility];  Service: Orthopedics    SENTINEL LYMPH NODE BIOPSY      WRIST SURGERY         HPI:  Wt Readings from Last 20 Encounters:   05/20/21 80 7 kg (178 lb)   05/14/21 78 5 kg (173 lb)   11/12/20 78 5 kg (173 lb)   06/22/20 78 2 kg (172 lb 8 oz)   05/13/20 77 6 kg (171 lb)   05/12/20 77 6 kg (171 lb)   12/30/19 77 5 kg (170 lb 12 8 oz)   10/31/19 78 5 kg (173 lb 1 6 oz)   10/23/19 78 9 kg (173 lb 15 1 oz)   10/14/19 78 kg (172 lb)   09/16/19 78 kg (172 lb)   07/29/19 77 1 kg (170 lb)   04/24/19 76 4 kg (168 lb 6 oz)   12/20/18 74 4 kg (164 lb)   11/29/18 74 5 kg (164 lb 3 2 oz)   10/15/18 73 5 kg (162 lb)   10/10/18 73 7 kg (162 lb 8 oz)   10/03/18 72 7 kg (160 lb 4 4 oz)   09/24/18 73 9 kg (163 lb)   09/10/18 73 9 kg (163 lb)       Severity: Mild  Onset: last 2 years 20 lbs   Modifiers: Diet and Exercise  Contributing factors: Poor Food Choices and Insufficient Physical Activity  Associated symptoms: comorbid conditions  Goal weight: 140-145    Pt is a big snacker at night  Motivation- playing with her grandkids, her health  B- orange juice, coffee   S- yogurt, PB crackers   L- pork roll sandwich, cherry tomatoes, chips   S-  D- boneless porkchops, peas, buttered noodles   S- popcorn, fresca, licorice     Hydration: 48 oz water  Alcohol: wine 2-3 glasses a week  Smoking: no  Exercise: no; has a foot pedal bike, likes to walk but lacks accountability  Sleep: 6-8 hours   STOP bang: +NEDRA but cannot tolerate CPAP due to claustrophobia and nose piece dried out her nose too much     The following portions of the patient's history were reviewed and updated as appropriate: allergies, current medications, past family history, past medical history, past social history, past surgical history, and problem list     Review of Systems   Constitutional: Negative for appetite change, chills and fever  HENT: Negative for rhinorrhea and sore throat  Respiratory: Positive for shortness of breath (with exertion, talking)  Negative for cough and chest tightness  Cardiovascular: Negative for chest pain and leg swelling  Gastrointestinal: Positive for abdominal pain (chronic) and diarrhea  Negative for constipation, nausea and vomiting  Endocrine: Negative for cold intolerance and heat intolerance  Genitourinary: Negative for difficulty urinating  Musculoskeletal: Positive for back pain  Negative for arthralgias  Skin: Negative for color change  Neurological: Negative for dizziness, numbness and headaches  Psychiatric/Behavioral: Positive for sleep disturbance  The patient is not nervous/anxious  All other systems reviewed and are negative  Objective:  /82 (BP Location: Right arm, Patient Position: Sitting, Cuff Size: Standard)   Pulse 81   Temp 98 4 °F (36 9 °C) (Temporal)   Ht 5' 3" (1 6 m)   Wt 80 7 kg (178 lb)   BMI 31 53 kg/m²   Constitutional: Well-developed, well-nourished and obese Body mass index is 31 53 kg/m²  [place] HEENT: No conjunctival pallor or jaundice  Pulmonary: No increased work of breathing or signs of respiratory distress  CV: Normal rate, well-perfused  GI: Obese  Non-distended  MSK: No edema   Neuro: Oriented to person, place and time  Normal Speech  Normal gait  Psych: Normal affect and mood       Labs and Imaging  Lab Results   Component Value Date    WBC 6 52 10/14/2019    HGB 13 2 10/14/2019    HCT 41 2 10/14/2019    MCV 95 10/14/2019     10/14/2019     Lab Results   Component Value Date    SODIUM 137 05/12/2020    K 4 4 05/12/2020     05/12/2020    CO2 29 05/12/2020    AGAP 3 (L) 05/12/2020    BUN 13 05/12/2020    CREATININE 0 79 05/12/2020    GLUC 81 10/14/2019    GLUF 106 (H) 05/12/2020    CALCIUM 8 7 05/12/2020    AST 25 05/12/2020    ALT 43 05/12/2020    ALKPHOS 70 05/12/2020    TP 7 3 05/12/2020    TBILI 0 67 05/12/2020    EGFR 82 05/12/2020     Lab Results   Component Value Date    HGBA1C 5 1 05/12/2020     Lab Results   Component Value Date    BNI9JIAUYJBQ 2 160 04/24/2019     Lab Results Component Value Date    CHOLESTEROL 222 (H) 05/12/2020     Lab Results   Component Value Date    HDL 46 05/12/2020     Lab Results   Component Value Date    TRIG 235 (H) 05/12/2020     No results found for: LDLDIRECT

## 2021-05-21 ENCOUNTER — TELEPHONE (OUTPATIENT)
Dept: BARIATRICS | Facility: CLINIC | Age: 61
End: 2021-05-21

## 2021-05-25 ENCOUNTER — APPOINTMENT (OUTPATIENT)
Dept: LAB | Facility: MEDICAL CENTER | Age: 61
End: 2021-05-25
Payer: COMMERCIAL

## 2021-05-25 DIAGNOSIS — Z13.220 SCREENING FOR LIPID DISORDERS: ICD-10-CM

## 2021-05-25 DIAGNOSIS — Z13.29 SCREENING FOR THYROID DISORDER: ICD-10-CM

## 2021-05-25 DIAGNOSIS — Z13.1 SCREENING FOR DIABETES MELLITUS: ICD-10-CM

## 2021-05-25 LAB
ALBUMIN SERPL BCP-MCNC: 4 G/DL (ref 3.5–5)
ALP SERPL-CCNC: 69 U/L (ref 46–116)
ALT SERPL W P-5'-P-CCNC: 47 U/L (ref 12–78)
ANION GAP SERPL CALCULATED.3IONS-SCNC: 5 MMOL/L (ref 4–13)
AST SERPL W P-5'-P-CCNC: 27 U/L (ref 5–45)
BILIRUB SERPL-MCNC: 0.86 MG/DL (ref 0.2–1)
BUN SERPL-MCNC: 16 MG/DL (ref 5–25)
CALCIUM SERPL-MCNC: 9.2 MG/DL (ref 8.3–10.1)
CHLORIDE SERPL-SCNC: 109 MMOL/L (ref 100–108)
CHOLEST SERPL-MCNC: 206 MG/DL (ref 50–200)
CO2 SERPL-SCNC: 27 MMOL/L (ref 21–32)
CREAT SERPL-MCNC: 0.88 MG/DL (ref 0.6–1.3)
EST. AVERAGE GLUCOSE BLD GHB EST-MCNC: 108 MG/DL
GFR SERPL CREATININE-BSD FRML MDRD: 72 ML/MIN/1.73SQ M
GLUCOSE P FAST SERPL-MCNC: 102 MG/DL (ref 65–99)
HBA1C MFR BLD: 5.4 %
HDLC SERPL-MCNC: 48 MG/DL
LDLC SERPL CALC-MCNC: 120 MG/DL (ref 0–100)
LDLC SERPL DIRECT ASSAY-MCNC: 142 MG/DL (ref 0–100)
NONHDLC SERPL-MCNC: 158 MG/DL
POTASSIUM SERPL-SCNC: 4.7 MMOL/L (ref 3.5–5.3)
PROT SERPL-MCNC: 7.6 G/DL (ref 6.4–8.2)
SODIUM SERPL-SCNC: 141 MMOL/L (ref 136–145)
T4 FREE SERPL-MCNC: 0.97 NG/DL (ref 0.76–1.46)
TRIGL SERPL-MCNC: 192 MG/DL
TSH SERPL DL<=0.05 MIU/L-ACNC: 2.79 UIU/ML (ref 0.36–3.74)

## 2021-05-25 PROCEDURE — 36415 COLL VENOUS BLD VENIPUNCTURE: CPT

## 2021-05-25 PROCEDURE — 83036 HEMOGLOBIN GLYCOSYLATED A1C: CPT

## 2021-05-25 PROCEDURE — 80061 LIPID PANEL: CPT

## 2021-05-25 PROCEDURE — 83721 ASSAY OF BLOOD LIPOPROTEIN: CPT

## 2021-05-25 PROCEDURE — 80053 COMPREHEN METABOLIC PANEL: CPT

## 2021-05-25 PROCEDURE — 84443 ASSAY THYROID STIM HORMONE: CPT

## 2021-05-25 PROCEDURE — 84439 ASSAY OF FREE THYROXINE: CPT

## 2021-06-01 ENCOUNTER — OFFICE VISIT (OUTPATIENT)
Dept: BARIATRICS | Facility: CLINIC | Age: 61
End: 2021-06-01

## 2021-06-01 VITALS — BODY MASS INDEX: 31.54 KG/M2 | HEIGHT: 63 IN | WEIGHT: 178 LBS

## 2021-06-01 DIAGNOSIS — R63.5 ABNORMAL WEIGHT GAIN: ICD-10-CM

## 2021-06-01 PROCEDURE — WMPRO12

## 2021-06-01 PROCEDURE — RECHECK

## 2021-06-01 NOTE — PROGRESS NOTES
Weight Management Medical Nutrition Assessment  Geno Venegas was here today as a new start in the Healthy [facility] Program    Today she weighs 178 0 lbs and has a goal weight of 140 - 150 lbs  She is not currently exercising and does not log her food  She admits that she "does not pay attention to portion sizes and usually has seconds at dinner "  Reviewed portion sizes, calorie needs, and the importance of food logging and regular exercise  She has started snacking less at night since meeting with Dr Sangeetha Greco 2 weeks ago  Questions asked and answered  Patient seen by Medical Provider in past 6 months:  yes  Requested to schedule appointment with Medical Provider: No      Anthropometric Measurements  Start Weight (#): 178 0 lbs  Current Weight (#): 178 0 lbs  TBW % Change from start weight:0%  Ideal Body Weight (#):115  Goal Weight (#):140 - 150    Weight Loss History  Previous weight loss attempts: Self Created Diets (Portion Control, Healthy Food Choices, etc )    Food and Nutrition Related History  Wake up: 6:30 am  Bed Time: 10 pm    Food Recall  Breakfast: coffee (used to eat poptart)  Now is having eggs     Snack:  Lunch: sanwich or salad or leftovers  Snack:  fruit  Dinner: 6 pm (meat potato veg)  Snack: grazing (chesse curls, sf buble ice cream)    Beverages: water  Volume of beverage intake: 60 oz    Weekends: Same  Cravings: crunchy salty  Trouble area of day:eveing    Frequency of Eating out: one to two times per week - Friday and Sat nights  Food restrictions: none  Cooking: self   Food Shopping: self    Physical Activity Intake  Activity:none  Frequency:none  Physical limitations/barriers to exercise: none    Estimated Needs  Energy    [place] Energy Needs:  BMR : 4850   1# loss weekly sedentary: 1116           1-2# loss weekly lightly active:1351  Maintenance calories for sedentary activity level: 1616  Protein:63 - 78     (1 2-1 5g/kg IBW)  Fluid: 62     (35mL/kg IBW)    Nutrition Diagnosis  Yes; Overweight/obesity  related to Excess energy intake as evidenced by  BMI more than normative standard for age and sex (obesity-grade I 26-30  9)       Nutrition Intervention    Nutrition Prescription  Calories:1000 - 1100  Protein: 61 - 66  Fluid:62    Nutrition Education:    Healthy Core Manual  Calorie controlled menu  Lean protein food choices  Healthy snack options  Food journaling tips      Nutrition Counseling:  Strategies: meal planning, portion sizes, healthy snack choices, hydration, fiber intake, protein intake, exercise, food journal      Monitoring and Evaluation:  Evaluation criteria:  Energy Intake  Meet protein needs  Maintain adequate hydration  Monitor weekly weight  Meal planning/preparation  Food journal   Decreased portions at mealtimes and snacks  Physical activity     Barriers to learning:none  Readiness to change: Action:  (Changing behavior)  Comprehension: good  Expected Compliance: good

## 2021-06-09 ENCOUNTER — CLINICAL SUPPORT (OUTPATIENT)
Dept: BARIATRICS | Facility: CLINIC | Age: 61
End: 2021-06-09

## 2021-06-09 DIAGNOSIS — R63.5 ABNORMAL WEIGHT GAIN: Primary | ICD-10-CM

## 2021-06-09 PROCEDURE — 3008F BODY MASS INDEX DOCD: CPT | Performed by: INTERNAL MEDICINE

## 2021-06-09 PROCEDURE — RECHECK

## 2021-06-10 VITALS — BODY MASS INDEX: 30.65 KG/M2 | HEIGHT: 63 IN | WEIGHT: 173 LBS

## 2021-06-22 ENCOUNTER — OFFICE VISIT (OUTPATIENT)
Dept: BARIATRICS | Facility: CLINIC | Age: 61
End: 2021-06-22

## 2021-06-22 VITALS — BODY MASS INDEX: 31.18 KG/M2 | HEIGHT: 63 IN | WEIGHT: 176 LBS

## 2021-06-22 DIAGNOSIS — R63.5 ABNORMAL WEIGHT GAIN: Primary | ICD-10-CM

## 2021-06-22 PROCEDURE — RECHECK

## 2021-06-22 NOTE — PROGRESS NOTES
Weight Management Medical Nutrition Assessment  Jose Harris was here today for a 2 week follow-up in the Healthy [facility] Program    Today she weighs 176 0 lbs gviing her a loss of 2 lbs in the last 2 weeks  She reports that she has been food logging, and increasing her water in take  She has been limiting her carb intake as well and trying to walk several times per week  She also reports that she did go to [facility] for 4 days, and "did not do as well" with her eating  Patient seen by Medical Provider in past 6 months:  yes  Requested to schedule appointment with Medical Provider: No        Anthropometric Measurements  Start Weight (#): 178 0 lbs  Current Weight (#): 176 0 lbs  TBW % Change from start roxxpa61%  Ideal Body Weight (#):115  Goal Weight (#):140 - 150     Weight Loss History  Previous weight loss attempts: Self Created Diets (Portion Control, Healthy Food Choices, etc )     Food and Nutrition Related History  Wake up: 6:30 am  Bed Time: 10 pm     Food Recall  Breakfast: coffee, and eggs                           Snack:  Lunch: sandwich or salad or leftovers  Snack:  fruit  Dinner: lean protein, and vegetables - cutting back on the starches  Snack: popcorn     Beverages: water  Volume of beverage intake: 60 oz     Weekends: Same  Cravings: crunchy salty  Trouble area of day:eveing     Frequency of Eating out: one to two times per week - Friday and Sat nights  Food restrictions: none  Cooking: self   Food Shopping: self     Physical Activity Intake  Activity: walking  Frequency: 3 times  Physical limitations/barriers to exercise: none     Estimated Needs  Energy     [place] Energy Needs: BMR : 2140   1# loss weekly sedentary: 1116           1-2# loss weekly lightly active:1351  Maintenance calories for sedentary activity level: 1616  Protein:63 - 78     (1 2-1 5g/kg IBW)  Fluid: 62     (35mL/kg IBW)     Nutrition Diagnosis  Yes;     Overweight/obesity  related to Excess energy intake as evidenced by BMI more than normative standard for age and sex (obesity-grade I 26-30  9)     Nutrition Intervention     Nutrition Prescription  Calories:1000 - 1100  Protein: 61 - 66  Fluid:62     Nutrition Education:    Healthy Core Manual  Calorie controlled menu  Lean protein food choices  Healthy snack options  Food journaling tips        Nutrition Counseling:  Strategies: meal planning, portion sizes, healthy snack choices, hydration, fiber intake, protein intake, exercise, food journal        Monitoring and Evaluation:  Evaluation criteria:  Energy Intake  Meet protein needs  Maintain adequate hydration  Monitor weekly weight  Meal planning/preparation  Food journal   Decreased portions at mealtimes and snacks  Physical activity      Barriers to learning:none  Readiness to change: Action:  (Changing behavior)  Comprehension: good  Expected Compliance: good

## 2021-06-23 ENCOUNTER — CLINICAL SUPPORT (OUTPATIENT)
Dept: BARIATRICS | Facility: CLINIC | Age: 61
End: 2021-06-23

## 2021-06-23 DIAGNOSIS — R63.5 ABNORMAL WEIGHT GAIN: Primary | ICD-10-CM

## 2021-06-23 PROCEDURE — RECHECK

## 2021-06-24 VITALS — HEIGHT: 63 IN | WEIGHT: 176 LBS | BODY MASS INDEX: 31.18 KG/M2

## 2021-06-30 ENCOUNTER — CLINICAL SUPPORT (OUTPATIENT)
Dept: BARIATRICS | Facility: CLINIC | Age: 61
End: 2021-06-30

## 2021-06-30 DIAGNOSIS — R63.5 ABNORMAL WEIGHT GAIN: Primary | ICD-10-CM

## 2021-06-30 PROCEDURE — 3008F BODY MASS INDEX DOCD: CPT | Performed by: INTERNAL MEDICINE

## 2021-06-30 PROCEDURE — RECHECK

## 2021-07-01 VITALS — HEIGHT: 63 IN | WEIGHT: 173.4 LBS | BODY MASS INDEX: 30.72 KG/M2

## 2021-07-07 ENCOUNTER — CLINICAL SUPPORT (OUTPATIENT)
Dept: BARIATRICS | Facility: CLINIC | Age: 61
End: 2021-07-07

## 2021-07-07 DIAGNOSIS — R63.5 ABNORMAL WEIGHT GAIN: Primary | ICD-10-CM

## 2021-07-07 PROCEDURE — RECHECK

## 2021-07-08 VITALS — HEIGHT: 63 IN | BODY MASS INDEX: 30.72 KG/M2

## 2021-07-14 ENCOUNTER — CLINICAL SUPPORT (OUTPATIENT)
Dept: BARIATRICS | Facility: CLINIC | Age: 61
End: 2021-07-14

## 2021-07-14 DIAGNOSIS — R63.5 ABNORMAL WEIGHT GAIN: Primary | ICD-10-CM

## 2021-07-14 PROCEDURE — RECHECK

## 2021-07-15 VITALS — BODY MASS INDEX: 30.72 KG/M2 | HEIGHT: 63 IN

## 2021-07-21 ENCOUNTER — CLINICAL SUPPORT (OUTPATIENT)
Dept: BARIATRICS | Facility: CLINIC | Age: 61
End: 2021-07-21

## 2021-07-21 VITALS — HEIGHT: 63 IN | WEIGHT: 173.6 LBS | BODY MASS INDEX: 30.76 KG/M2

## 2021-07-21 DIAGNOSIS — R63.5 ABNORMAL WEIGHT GAIN: Primary | ICD-10-CM

## 2021-07-21 PROCEDURE — RECHECK

## 2021-07-28 ENCOUNTER — CLINICAL SUPPORT (OUTPATIENT)
Dept: BARIATRICS | Facility: CLINIC | Age: 61
End: 2021-07-28

## 2021-07-28 VITALS — BODY MASS INDEX: 30.58 KG/M2 | HEIGHT: 63 IN | WEIGHT: 172.6 LBS

## 2021-07-28 DIAGNOSIS — R63.5 ABNORMAL WEIGHT GAIN: Primary | ICD-10-CM

## 2021-07-28 PROCEDURE — RECHECK

## 2021-08-23 ENCOUNTER — OFFICE VISIT (OUTPATIENT)
Dept: BARIATRICS | Facility: CLINIC | Age: 61
End: 2021-08-23

## 2021-08-23 VITALS — BODY MASS INDEX: 29.8 KG/M2 | WEIGHT: 168.2 LBS | HEIGHT: 63 IN

## 2021-08-23 DIAGNOSIS — R63.5 ABNORMAL WEIGHT GAIN: Primary | ICD-10-CM

## 2021-08-23 PROCEDURE — RECHECK

## 2021-08-23 NOTE — PROGRESS NOTES
Weight Management Medical Nutrition Assessment  Baron Waters was here today for a 2 month follow-up in the [facility] Program    Today she weighs 168 2 lbs gviing her a loss of 7 8 lbs in the last 4 weeks  She reports that she continues food log everything she eats, and increasing her water in take  She has been limiting her carb intake as well and is still walking several times per week  She just purchased a boxing bag over the weekend, so she is excited to add that into her exercise routine     Patient seen by Medical Provider in past 6 months:  yes  Requested to schedule appointment with Medical Provider: No        Anthropometric Measurements  Start Weight (#): 178 0 lbs  Current Weight (#): 168 2lbs  TBW % Change from start weight:5%  Ideal Body Weight (#):115  Goal Weight (#):140 - 150     Weight Loss History  Previous weight loss attempts: Self Created Diets (Portion Control, Healthy Food Choices, etc )     Food and Nutrition Related History  Wake up: 6:30 am  Bed Time: 10 pm     Food Recall  Breakfast: coffee, and eggs                           Snack:  Lunch: sandwich or salad or leftovers  Snack:  fruit  Dinner: lean protein, and vegetables - cutting back on the starches to once per week  Snack: popcorn     Beverages: water  Volume of beverage intake: 60 oz  (has cut out soda completely)     Weekends: Same  Cravings: crunchy salty  Trouble area of day:eveing     Frequency of Eating out: one to two times per week - Friday and Sat nights  Food restrictions: none  Cooking: self   Food Shopping: self     Physical Activity Intake  Activity: walking  Frequency: 3 times  Physical limitations/barriers to exercise: none     Estimated Needs  Energy     [place] Energy Needs:  BMR : 4050   6# loss weekly sedentary: 0594         2-4# loss weekly lightly active:1290  Maintenance calories for sedentary activity level: 1562  Protein:63 - 78     (1 2-1 5g/kg IBW)  Fluid: 62     (35mL/kg IBW)     Nutrition Diagnosis  Yes;    Overweight/obesity  related to Excess energy intake as evidenced by  BMI more than normative standard for age and sex (obesity-grade I 26-30  9)     Nutrition Intervention     Nutrition Prescription  Calories:1000 - 1100  Protein: 63 - 66  Fluid:62     Nutrition Education:    Healthy Core Manual  Calorie controlled menu  Lean protein food choices  Healthy snack options  Food journaling tips        Nutrition Counseling:  Strategies: meal planning, portion sizes, healthy snack choices, hydration, fiber intake, protein intake, exercise, food journal        Monitoring and Evaluation:  Evaluation criteria:  Energy Intake  Meet protein needs  Maintain adequate hydration  Monitor weekly weight  Meal planning/preparation  Food journal   Decreased portions at mealtimes and snacks  Physical activity      Barriers to learning:none  Readiness to change: Action:  (Changing behavior)  Comprehension: good  Expected Compliance: good

## 2021-08-31 ENCOUNTER — HOSPITAL ENCOUNTER (OUTPATIENT)
Dept: RADIOLOGY | Age: 61
Discharge: HOME | End: 2021-08-31
Payer: COMMERCIAL

## 2021-08-31 VITALS — HEIGHT: 63 IN | WEIGHT: 168 LBS | BODY MASS INDEX: 29.77 KG/M2

## 2021-08-31 DIAGNOSIS — Z12.31 ENCOUNTER FOR SCREENING MAMMOGRAM FOR MALIGNANT NEOPLASM OF BREAST: ICD-10-CM

## 2021-08-31 DIAGNOSIS — M81.0 AGE-RELATED OSTEOPOROSIS WITHOUT CURRENT PATHOLOGICAL FRACTURE: ICD-10-CM

## 2021-08-31 DIAGNOSIS — Z12.31 VISIT FOR SCREENING MAMMOGRAM: ICD-10-CM

## 2021-08-31 PROCEDURE — 77080 DXA BONE DENSITY AXIAL: CPT

## 2021-08-31 PROCEDURE — 77067 SCR MAMMO BI INCL CAD: CPT

## 2021-08-31 PROCEDURE — 77063 BREAST TOMOSYNTHESIS BI: CPT

## 2021-09-07 PROBLEM — M81.0 OSTEOPOROSIS: Status: RESOLVED | Noted: 2017-05-10 | Resolved: 2021-09-07

## 2021-09-07 PROBLEM — M85.80 OSTEOPENIA: Status: ACTIVE | Noted: 2021-09-07

## 2021-09-15 ENCOUNTER — CLINICAL SUPPORT (OUTPATIENT)
Dept: BARIATRICS | Facility: CLINIC | Age: 61
End: 2021-09-15

## 2021-09-15 VITALS — HEIGHT: 63 IN | WEIGHT: 168.6 LBS | BODY MASS INDEX: 29.88 KG/M2

## 2021-09-15 DIAGNOSIS — R63.5 ABNORMAL WEIGHT GAIN: Primary | ICD-10-CM

## 2021-09-15 PROCEDURE — RECHECK

## 2021-09-22 ENCOUNTER — CLINICAL SUPPORT (OUTPATIENT)
Dept: BARIATRICS | Facility: CLINIC | Age: 61
End: 2021-09-22

## 2021-09-22 VITALS — WEIGHT: 167.6 LBS | BODY MASS INDEX: 29.7 KG/M2 | HEIGHT: 63 IN

## 2021-09-22 DIAGNOSIS — R63.5 ABNORMAL WEIGHT GAIN: Primary | ICD-10-CM

## 2021-09-22 PROCEDURE — RECHECK

## 2021-09-29 ENCOUNTER — CLINICAL SUPPORT (OUTPATIENT)
Dept: BARIATRICS | Facility: CLINIC | Age: 61
End: 2021-09-29

## 2021-09-29 VITALS — HEIGHT: 63 IN | WEIGHT: 168 LBS | BODY MASS INDEX: 29.77 KG/M2

## 2021-09-29 DIAGNOSIS — R63.5 ABNORMAL WEIGHT GAIN: Primary | ICD-10-CM

## 2021-09-29 PROCEDURE — RECHECK

## 2021-10-06 ENCOUNTER — CLINICAL SUPPORT (OUTPATIENT)
Dept: BARIATRICS | Facility: CLINIC | Age: 61
End: 2021-10-06

## 2021-10-06 VITALS — WEIGHT: 166.8 LBS | HEIGHT: 63 IN | BODY MASS INDEX: 29.55 KG/M2

## 2021-10-06 DIAGNOSIS — R63.5 ABNORMAL WEIGHT GAIN: Primary | ICD-10-CM

## 2021-10-06 PROCEDURE — RECHECK

## 2021-10-13 ENCOUNTER — OFFICE VISIT (OUTPATIENT)
Dept: BARIATRICS | Facility: CLINIC | Age: 61
End: 2021-10-13

## 2021-10-13 ENCOUNTER — CLINICAL SUPPORT (OUTPATIENT)
Dept: BARIATRICS | Facility: CLINIC | Age: 61
End: 2021-10-13

## 2021-10-13 VITALS — WEIGHT: 166.8 LBS | BODY MASS INDEX: 29.55 KG/M2 | HEIGHT: 63 IN

## 2021-10-13 DIAGNOSIS — R63.5 ABNORMAL WEIGHT GAIN: Primary | ICD-10-CM

## 2021-10-13 PROCEDURE — RECHECK

## 2021-10-22 ENCOUNTER — APPOINTMENT (OUTPATIENT)
Dept: LAB | Facility: MEDICAL CENTER | Age: 61
End: 2021-10-22
Payer: COMMERCIAL

## 2021-10-22 DIAGNOSIS — E55.9 VITAMIN D DEFICIENCY: ICD-10-CM

## 2021-10-22 LAB — 25(OH)D3 SERPL-MCNC: 14.6 NG/ML (ref 30–100)

## 2021-10-22 PROCEDURE — 82306 VITAMIN D 25 HYDROXY: CPT

## 2021-10-22 PROCEDURE — 36415 COLL VENOUS BLD VENIPUNCTURE: CPT

## 2021-10-25 DIAGNOSIS — E55.9 VITAMIN D DEFICIENCY: ICD-10-CM

## 2021-10-26 RX ORDER — ERGOCALCIFEROL 1.25 MG/1
50000 CAPSULE ORAL WEEKLY
Qty: 4 CAPSULE | Refills: 0 | Status: SHIPPED | OUTPATIENT
Start: 2021-10-26 | End: 2021-11-04 | Stop reason: SDUPTHER

## 2021-11-02 ENCOUNTER — TELEPHONE (OUTPATIENT)
Dept: FAMILY MEDICINE CLINIC | Facility: MEDICAL CENTER | Age: 61
End: 2021-11-02

## 2021-11-02 DIAGNOSIS — E55.9 VITAMIN D DEFICIENCY: ICD-10-CM

## 2021-11-04 DIAGNOSIS — E55.9 VITAMIN D DEFICIENCY: ICD-10-CM

## 2021-11-04 RX ORDER — ERGOCALCIFEROL 1.25 MG/1
CAPSULE ORAL
Qty: 4 CAPSULE | Refills: 0 | Status: SHIPPED | OUTPATIENT
Start: 2021-11-04

## 2021-11-04 RX ORDER — ERGOCALCIFEROL 1.25 MG/1
50000 CAPSULE ORAL 2 TIMES WEEKLY
Qty: 4 CAPSULE | Refills: 0 | Status: SHIPPED | OUTPATIENT
Start: 2021-11-04 | End: 2021-11-04

## 2021-11-11 ENCOUNTER — OFFICE VISIT (OUTPATIENT)
Dept: BARIATRICS | Facility: CLINIC | Age: 61
End: 2021-11-11
Payer: COMMERCIAL

## 2021-11-11 VITALS
DIASTOLIC BLOOD PRESSURE: 76 MMHG | SYSTOLIC BLOOD PRESSURE: 110 MMHG | HEIGHT: 63 IN | WEIGHT: 169.1 LBS | BODY MASS INDEX: 29.96 KG/M2 | TEMPERATURE: 98 F | HEART RATE: 87 BPM

## 2021-11-11 DIAGNOSIS — E78.2 MIXED HYPERLIPIDEMIA: ICD-10-CM

## 2021-11-11 DIAGNOSIS — E66.3 OVERWEIGHT: Primary | ICD-10-CM

## 2021-11-11 DIAGNOSIS — R63.5 ABNORMAL WEIGHT GAIN: ICD-10-CM

## 2021-11-11 DIAGNOSIS — G47.33 OBSTRUCTIVE SLEEP APNEA: ICD-10-CM

## 2021-11-11 PROCEDURE — 99214 OFFICE O/P EST MOD 30 MIN: CPT | Performed by: INTERNAL MEDICINE

## 2021-11-11 PROCEDURE — 1036F TOBACCO NON-USER: CPT | Performed by: INTERNAL MEDICINE

## 2021-11-11 PROCEDURE — 3008F BODY MASS INDEX DOCD: CPT | Performed by: INTERNAL MEDICINE

## 2021-11-11 RX ORDER — ALENDRONATE SODIUM 35 MG/1
70 TABLET ORAL
COMMUNITY

## 2021-11-11 RX ORDER — TOPIRAMATE 50 MG/1
TABLET, FILM COATED ORAL
Qty: 30 TABLET | Refills: 2 | Status: SHIPPED | OUTPATIENT
Start: 2021-11-11

## 2021-11-11 RX ORDER — ALENDRONATE SODIUM 70 MG/1
70 TABLET ORAL
COMMUNITY

## 2022-02-18 NOTE — PROGRESS NOTES
Daily Note     Today's date: 2019  Patient name: Glory Willett  : 1960  MRN: [id number]  Referring provider: Ravin Russo DO  Dx:   Encounter Diagnosis     ICD-10-CM    1  Occipital pain R51    2  Neck pain M54 2        Start Time: 1630  Stop Time: 1710  Total time in clinic (min): 40 minutes    Subjective: pt reported significant improvements post treatment LV  Pt stated she has not had a HA since LV  Today she states that she feels tight, but no HA  Objective: See treatment diary below      Assessment: Tolerated treatment well  Continuation of IASTM was performed with stretching following  Pt completed all exercises with no additional complaints  We will continue to monitor and progress the patient as tolerated  Patient would benefit from continued PT      Plan: Continue per plan of care  Precautions: depression,  Hx of angina, osteoporosis     Pt 1:1 from 4:36-5:10  Manual             IASTM  10' 8'                                                                   Exercise Diary            UT/LS stretch 30"x3 30"x3 30" x3 ea  Chin tucks 2"x10 2"x10 2" x10          scap retract 5"x10 5"x10 HEP          Arm bike  5' bw pullies 5'          TB rows/ext  rtb 2x10 ea   rtb 2x10          DNF end   5"x10 10"x10          No monies  rtb 2x10 rtb 2x10          Arm monster walks  ytb 5 laps rtb 7 laps                                                                                                                                                                          Modalities
Pt's chart shows that she is experiencing other health related issues currently  Pt was in clinic for OT evaluation and stated that she is managing symptoms I and would not like to continue therapy at this time    Pt will be DC from PT 
no

## 2022-03-02 DIAGNOSIS — J30.89 ENVIRONMENTAL AND SEASONAL ALLERGIES: ICD-10-CM

## 2022-03-02 RX ORDER — MONTELUKAST SODIUM 10 MG/1
10 TABLET ORAL
Qty: 30 TABLET | Refills: 0 | Status: SHIPPED | OUTPATIENT
Start: 2022-03-02

## 2022-03-18 ENCOUNTER — VBI (OUTPATIENT)
Dept: ADMINISTRATIVE | Facility: OTHER | Age: 62
End: 2022-03-18

## 2022-06-09 ENCOUNTER — VBI (OUTPATIENT)
Dept: ADMINISTRATIVE | Facility: OTHER | Age: 62
End: 2022-06-09

## 2022-10-25 ENCOUNTER — OFFICE VISIT (OUTPATIENT)
Dept: FAMILY MEDICINE CLINIC | Facility: MEDICAL CENTER | Age: 62
End: 2022-10-25
Payer: COMMERCIAL

## 2022-10-25 VITALS
HEIGHT: 63 IN | TEMPERATURE: 99.6 F | BODY MASS INDEX: 29.59 KG/M2 | SYSTOLIC BLOOD PRESSURE: 122 MMHG | HEART RATE: 78 BPM | RESPIRATION RATE: 16 BRPM | OXYGEN SATURATION: 99 % | WEIGHT: 167 LBS | DIASTOLIC BLOOD PRESSURE: 78 MMHG

## 2022-10-25 DIAGNOSIS — Z13.1 DIABETES MELLITUS SCREENING: ICD-10-CM

## 2022-10-25 DIAGNOSIS — Z76.89 ENCOUNTER TO ESTABLISH CARE WITH NEW DOCTOR: Primary | ICD-10-CM

## 2022-10-25 DIAGNOSIS — J30.89 ENVIRONMENTAL AND SEASONAL ALLERGIES: ICD-10-CM

## 2022-10-25 DIAGNOSIS — G47.33 OBSTRUCTIVE SLEEP APNEA: ICD-10-CM

## 2022-10-25 DIAGNOSIS — M75.21 BICEPS TENDINITIS OF RIGHT UPPER EXTREMITY: ICD-10-CM

## 2022-10-25 DIAGNOSIS — Z23 ENCOUNTER FOR IMMUNIZATION: ICD-10-CM

## 2022-10-25 DIAGNOSIS — Z12.31 SCREENING MAMMOGRAM FOR BREAST CANCER: ICD-10-CM

## 2022-10-25 DIAGNOSIS — Z13.29 THYROID DISORDER SCREEN: ICD-10-CM

## 2022-10-25 DIAGNOSIS — E78.2 MIXED HYPERLIPIDEMIA: ICD-10-CM

## 2022-10-25 DIAGNOSIS — E55.9 VITAMIN D DEFICIENCY: ICD-10-CM

## 2022-10-25 DIAGNOSIS — M85.89 OSTEOPENIA OF MULTIPLE SITES: ICD-10-CM

## 2022-10-25 PROBLEM — F32.5 MAJOR DEPRESSIVE DISORDER, SINGLE EPISODE IN FULL REMISSION (HCC): Status: RESOLVED | Noted: 2018-09-05 | Resolved: 2022-10-25

## 2022-10-25 PROCEDURE — 99214 OFFICE O/P EST MOD 30 MIN: CPT

## 2022-10-25 PROCEDURE — 90471 IMMUNIZATION ADMIN: CPT

## 2022-10-25 PROCEDURE — 90682 RIV4 VACC RECOMBINANT DNA IM: CPT

## 2022-10-25 RX ORDER — MELOXICAM 15 MG/1
15 TABLET ORAL DAILY
Qty: 14 TABLET | Refills: 0 | Status: SHIPPED | OUTPATIENT
Start: 2022-10-25 | End: 2022-11-08

## 2022-10-25 NOTE — PATIENT INSTRUCTIONS
You have bicep tendonitis  For this I would like you to take Meloxicam once daily for 14 days  Ice your shoulder multiple times throughout the day  Do shoulder exercises as discusses  If no improvement in 2 weeks, follow up with orthopedics  Referral placed

## 2022-10-25 NOTE — PROGRESS NOTES
Assessment/Plan:    Fasting blood work ordered  Mammogram ordered  Meloxicam prescribed for right shoulder pain  Symptom based treatment discussed with patient for right shoulder biceps tendinitis  Advised to follow-up with orthopedics in 2 weeks if symptoms do not improve  Influenza vaccine updated today  Follow-up in 6 months for annual physical or sooner if needed  1  Encounter to establish care with new doctor  26-year-old female presents to establish care  She is doing well overall  She is agreeable to fasting blood work and influenza vaccine  She is due for mammogram, agreeable  2  Encounter for immunization  Influenza vaccine updated today, tolerated well  - influenza vaccine, quadrivalent, recombinant, PF, 0 5 mL, for patients 18 yr+ (FLUBLOK)    3  Vitamin D deficiency  No current medication regimen  Check vitamin-D level  - Vitamin D 25 hydroxy; Future    4  Environmental and seasonal allergies  Patient reports allergies have resolved since she stopped going camping  No current medication regimen  Symptoms remain stable  5  Obstructive sleep apnea  Patient noncompliant with CPAP  Reports she cannot tolerate it  6  Mixed hyperlipidemia  Lipids elevated on previous labs  No current medication regimen  Check lipid panel and comprehensive metabolic panel   - Lipid panel; Future  - Comprehensive metabolic panel; Future    7  Osteopenia of multiple sites  DEXA scan from 08/2021 shows osteopenia in left hip and lumbar spine  Patient advised to take vitamin-D, calcium supplements and do weight-bearing exercises  8  Screening mammogram for breast cancer  Mammogram due, ordered  - Mammo screening bilateral w 3d & cad; Future    9  Thyroid disorder screen  - TSH, 3rd generation with Free T4 reflex; Future    10  Diabetes mellitus screening  - Hemoglobin A1C; Future    11   Biceps tendinitis of right upper extremity  Advised patient to take meloxicam once daily, ice right shoulder multiple times throughout the day and do shoulder exercises as discussed  Advised patient to follow-up with orthopedics in 2 weeks if no improvement  - meloxicam (Mobic) 15 mg tablet; Take 1 tablet (15 mg total) by mouth daily for 14 days  Dispense: 14 tablet; Refill: 0  - Ambulatory Referral to Orthopedic Surgery; Future      Subjective:      Patient ID: Laine Evans is a 64 y o  female  78-year-old female presents for follow-up and to establish care with new provider  She has osteopenia  Previously had osteoporosis which improved after being on Fosamax  Repeat Dexa last year showed improvement  Fosamax was discontinue  She was advised to start calcium and vitamin D supplements however she has not been taking either  She was also advised to do weight bearing exercised which she has not been doing  She did see weight management and was put on Topamax which she stopped due to side effects of numbness/tingling in hands  She did however lose approx  13 lbs  She is no longer following with weight management  She has vitamin-D deficiency  No current medication regimen  She has environmental and seasonal allergies  She was previously taking Singulair and flonase however is no longer taking either of these medications  Since she stopped going camping her symptoms resolved  She has NEDRA  She is unable to tolerate the CPAP machine therefore she does not use it  She has hyperlipidemia  No current medication regimen  She reports right shoulder pain x 2 months that started after throwing ball in the yard  Reports dull ache at all times that worsens with certain movements, laying on right side, lifting arm up worsens the pain as well  Pain radiates down shoulder into upper arm  Denies redness, swelling, ecchymosis, rash, hot to the touch  Taking tylenol for pain which does give her some relief  No other treatment have been tried at this point  No prior injuries or surgeries to right shoulder         The following portions of the patient's history were reviewed and updated as appropriate: current medications, past family history, past medical history, past social history, past surgical history and problem list     Review of Systems   Constitutional: Negative  HENT: Negative  Eyes: Negative  Respiratory: Negative  Cardiovascular: Negative  Gastrointestinal: Negative  Endocrine: Negative  Genitourinary: Negative  Musculoskeletal:        Right shoulder pain x 2 months  Skin: Negative  Allergic/Immunologic: Negative  Neurological: Negative  Hematological: Negative  Psychiatric/Behavioral: Negative  Objective:      /78 (BP Location: Left arm, Patient Position: Sitting, Cuff Size: Large)   Pulse 78   Temp 99 6 °F (37 6 °C)   Resp 16   Ht 5' 3" (1 6 m)   Wt 75 8 kg (167 lb)   SpO2 99%   BMI 29 58 kg/m²          Physical Exam  Vitals and nursing note reviewed  Constitutional:       General: She is not in acute distress  Appearance: Normal appearance  She is not ill-appearing  HENT:      Head: Normocephalic and atraumatic  Eyes:      General:         Right eye: No discharge  Left eye: No discharge  Conjunctiva/sclera: Conjunctivae normal       Pupils: Pupils are equal, round, and reactive to light  Cardiovascular:      Rate and Rhythm: Normal rate and regular rhythm  Pulses: Normal pulses  Heart sounds: Normal heart sounds  No murmur heard  Pulmonary:      Effort: Pulmonary effort is normal  No respiratory distress  Breath sounds: Normal breath sounds  No stridor  No wheezing, rhonchi or rales  Musculoskeletal:      Right shoulder: Tenderness present  No swelling or deformity  Decreased range of motion  Normal strength  Normal pulse  Left shoulder: Normal       Cervical back: Normal range of motion and neck supple  Skin:     General: Skin is warm and dry  Neurological:      General: No focal deficit present        Mental Status: She is alert and oriented to person, place, and time  Mental status is at baseline  Gait: Gait normal    Psychiatric:         Mood and Affect: Mood normal          Behavior: Behavior normal          Thought Content:  Thought content normal                     Jagdish Panchal

## 2022-10-28 ENCOUNTER — APPOINTMENT (OUTPATIENT)
Dept: LAB | Facility: MEDICAL CENTER | Age: 62
End: 2022-10-28
Payer: COMMERCIAL

## 2022-10-28 DIAGNOSIS — E55.9 VITAMIN D DEFICIENCY: ICD-10-CM

## 2022-10-28 DIAGNOSIS — Z13.29 THYROID DISORDER SCREEN: ICD-10-CM

## 2022-10-28 DIAGNOSIS — E78.2 MIXED HYPERLIPIDEMIA: ICD-10-CM

## 2022-10-28 DIAGNOSIS — Z13.1 DIABETES MELLITUS SCREENING: ICD-10-CM

## 2022-10-28 LAB
25(OH)D3 SERPL-MCNC: 15.8 NG/ML (ref 30–100)
ALBUMIN SERPL BCP-MCNC: 4 G/DL (ref 3.5–5)
ALP SERPL-CCNC: 66 U/L (ref 46–116)
ALT SERPL W P-5'-P-CCNC: 34 U/L (ref 12–78)
ANION GAP SERPL CALCULATED.3IONS-SCNC: 6 MMOL/L (ref 4–13)
AST SERPL W P-5'-P-CCNC: 17 U/L (ref 5–45)
BILIRUB SERPL-MCNC: 0.63 MG/DL (ref 0.2–1)
BUN SERPL-MCNC: 12 MG/DL (ref 5–25)
CALCIUM SERPL-MCNC: 9 MG/DL (ref 8.3–10.1)
CHLORIDE SERPL-SCNC: 108 MMOL/L (ref 96–108)
CHOLEST SERPL-MCNC: 207 MG/DL
CO2 SERPL-SCNC: 26 MMOL/L (ref 21–32)
CREAT SERPL-MCNC: 0.82 MG/DL (ref 0.6–1.3)
EST. AVERAGE GLUCOSE BLD GHB EST-MCNC: 105 MG/DL
GFR SERPL CREATININE-BSD FRML MDRD: 77 ML/MIN/1.73SQ M
GLUCOSE P FAST SERPL-MCNC: 110 MG/DL (ref 65–99)
HBA1C MFR BLD: 5.3 %
HDLC SERPL-MCNC: 53 MG/DL
LDLC SERPL CALC-MCNC: 122 MG/DL (ref 0–100)
NONHDLC SERPL-MCNC: 154 MG/DL
POTASSIUM SERPL-SCNC: 4.2 MMOL/L (ref 3.5–5.3)
PROT SERPL-MCNC: 7.3 G/DL (ref 6.4–8.4)
SODIUM SERPL-SCNC: 140 MMOL/L (ref 135–147)
TRIGL SERPL-MCNC: 159 MG/DL
TSH SERPL DL<=0.05 MIU/L-ACNC: 1.92 UIU/ML (ref 0.45–4.5)

## 2022-10-28 PROCEDURE — 82306 VITAMIN D 25 HYDROXY: CPT

## 2022-10-28 PROCEDURE — 36415 COLL VENOUS BLD VENIPUNCTURE: CPT

## 2022-10-28 PROCEDURE — 80061 LIPID PANEL: CPT

## 2022-10-28 PROCEDURE — 80053 COMPREHEN METABOLIC PANEL: CPT

## 2022-10-28 PROCEDURE — 83036 HEMOGLOBIN GLYCOSYLATED A1C: CPT

## 2022-10-28 PROCEDURE — 84443 ASSAY THYROID STIM HORMONE: CPT

## 2022-11-02 ENCOUNTER — TELEPHONE (OUTPATIENT)
Dept: SURGICAL ONCOLOGY | Facility: CLINIC | Age: 62
End: 2022-11-02

## 2022-11-02 NOTE — TELEPHONE ENCOUNTER
Called the patient to follow up with a potential new concern on her self breast exam  Patient confirmed that she felt a breast lump recently  Explained that she would likely need diagnostic breast imaging but she should be seen in the office for a clinical exam first  Patient was agreeable to this and accepted an appointment with ALLY Jacobs for tomorrow at 2:30  Patient verbalized understanding of appointment details and was appreciative of the phone call

## 2022-11-03 ENCOUNTER — CONSULT (OUTPATIENT)
Dept: SURGICAL ONCOLOGY | Facility: CLINIC | Age: 62
End: 2022-11-03

## 2022-11-03 VITALS
HEART RATE: 86 BPM | BODY MASS INDEX: 29.59 KG/M2 | SYSTOLIC BLOOD PRESSURE: 117 MMHG | HEIGHT: 63 IN | OXYGEN SATURATION: 97 % | TEMPERATURE: 98.9 F | RESPIRATION RATE: 17 BRPM | WEIGHT: 167 LBS | DIASTOLIC BLOOD PRESSURE: 76 MMHG

## 2022-11-03 DIAGNOSIS — N63.0 MASS OF BREAST, UNSPECIFIED LATERALITY: Primary | ICD-10-CM

## 2022-11-03 NOTE — PROGRESS NOTES
Surgical Oncology Follow Up       [facility] SURGICAL ONCOLOGY [facility] [address]    Kole Minus  1960  [id number]  [facility] SURGICAL ONCOLOGY [place]  [place] [id number]    Chief Complaint   Patient presents with   • New Patient Visit       Assessment/Plan:  1  Mass of breast, unspecified laterality  - 1 month follow up  - Mammo diagnostic bilateral w 3d & cad; Future  - US breast left limited (diagnostic); Future     Discussion/Summary:  Patient is a 27-year-old female presenting today for new left breast lump  She has a history of right breast cancer diagnosed in 2009  She underwent an excisional biopsy which revealed DCIS with microinvasion and ADH  She underwent a right lumpectomy with sentinel lymph node biopsy with Dr Vignesh Morris on 04/16/2009 which revealed no residual carcinoma in the specimen  She did not have lymphovascular invasion  She underwent whole breast radiation therapy and completed 5 years of tamoxifen  Patient had genetic testing which was negative  She does have a family history of breast cancer in her sister who was diagnosed at 55 and maternal aunt who was diagnosed at 52  Patient stated that she 1st noticed this left breast lump on Friday October 28th  She palpated this lump accidentally when her arm was up in the air and while she was showing her  he noted that he was able to see this lump outlined through the skin  On clinical breast exam there was slight nodularity noted on the left breast at the 3 o'clock position approximately 7 cm from the nipple which appears to be a ridge of breast tissue  This area is firm however there are not distinct edges  Patient denies adenopathy, skin changes, nipple changes or discharge  At this time I have recommended diagnostic mammogram as well as ultrasound to rule out malignancy    I instructed her that she will receive the results right away but I will call her at that time as well to discuss the results  I will tentatively plan to see her back in 1 month or sooner should the need  She was instructed call with any questions or concerns prior to that time  All questions were answered today  History of Present Illness:     Oncology History    No history exists         -Interval History:  Patient is a 57-year-old female with a history of right breast cancer in 2009 presenting today as a consult for left breast lump  She 1st noted this lump last Friday accidentally  She has been very anxious  She denies adenopathy, skin changes, nipple changes or discharge  She denies persistent headache, bone pain, back pain, shortness a breath or abdominal pain  Her age of menarche was 6years old  She has had 2 pregnancies and 2 live births  Her 1st child was born at 32  She is postmenopausal, age of menopause was 52years old  She has never taken hormone replacement therapy  She is not of Ashkenazi Yarsanism descent  Review of Systems:  Review of Systems   Constitutional: Negative for activity change, appetite change, fatigue and unexpected weight change  Respiratory: Negative for cough and shortness of breath  Cardiovascular: Negative for chest pain  Gastrointestinal: Negative for abdominal pain, diarrhea, nausea and vomiting  Endocrine: Negative for heat intolerance  Musculoskeletal: Negative for arthralgias, back pain and myalgias  Skin: Negative for rash  Neurological: Negative for weakness and headaches  Hematological: Negative for adenopathy  Psychiatric/Behavioral: The patient is nervous/anxious          Patient Active Problem List   Diagnosis   • Prinzmetal's angina (HCC)   • Environmental and seasonal allergies   • Hyperlipidemia   • Obstructive sleep apnea   • Vitamin D deficiency   • Osteopenia   • Breast lump     Past Medical History:   Diagnosis Date   • Abnormal Pap smear of cervix    • Anxiety 5/12/2020   • BRCA1 negative    • Breast cancer ([facility] [address] ) 02/06/2009    right breast   • Cancer ([facility] [place] [address] )     Right breast cancer R lymph node bx neg  Last assessed: 6/4/13   • Carpal tunnel syndrome    • Chronic kidney disease    • History of radiation therapy 03/2009   • Kidney stone     Last assessed: 1/7/16   • Major depressive disorder with single episode, in full remission ([facility] [place] [address] ) 9/5/2018   • Obesity (BMI 30 0-34  9)    • Osteoporosis    • Sleep apnea     Patient can't use CPAP     Past Surgical History:   Procedure Laterality Date   • BLADDER SUSPENSION     • BREAST BIOPSY Right 2009   • BREAST LUMPECTOMY Right 02/06/2009    malignant   • CHOLECYSTECTOMY     • COLONOSCOPY     • HAND SURGERY     • LITHOTRIPSY Left    • HI COLONOSCOPY FLX DX W/COLLJ SPEC WHEN PFRMD N/A 7/10/2017    Procedure: COLONOSCOPY;  Surgeon: Nader Chawla MD;  [facility];   Service: Gastroenterology   • HI EXC SKIN BENIG 0 6-1 CM TRUNK,ARM,LEG Left 10/23/2019    Procedure: UPPER EXTREMITY LESION/MASS EXCISION BIOPSY;  Surgeon: Merna Hartman MD;  [facility];  Service: Orthopedics   • HI REPAIR INTERCARP/CARP-METACARP JT Left 10/23/2019    Procedure: Fern Amador;  Surgeon: Merna Hartman MD;  [facility];  Service: Orthopedics   • HI WRIST Vipul Hawks LIG Left 10/3/2018    Procedure: ENDOSCOPIC CARPAL TUNNEL RELEASE;  Surgeon: Merna Hartman MD;  [facility];  Service: Orthopedics   • SENTINEL LYMPH NODE BIOPSY     • WRIST SURGERY       Family History   Problem Relation Age of Onset   • Skin cancer Mother    • Hypertension Mother    • Skin cancer Father    • Hypertension Father    • Prostate cancer Father 79        2 bouts   • Diabetes Sister         Mellitus   • Breast cancer Sister 40   • Uterine cancer Sister 48   • Skin cancer Sister    • Colon polyps Sister         Sigmoid   • Cancer Family    • Thyroid disease Family         Disorder   • Breast cancer Maternal Aunt 44 mets   • Breast cancer Cousin    • No Known Problems Daughter    • No Known Problems Maternal Grandmother    • No Known Problems Maternal Grandfather    • No Known Problems Paternal Grandmother    • No Known Problems Paternal Grandfather    • No Known Problems Daughter      Social History     Socioeconomic History   • Marital status: /Civil Union     Spouse name: Not on file   • Number of children: Not on file   • Years of education: Not on file   • Highest education level: Not on file   Occupational History   • Not on file   Tobacco Use   • Smoking status: Never Smoker   • Smokeless tobacco: Never Used   Vaping Use   • Vaping Use: Never used   Substance and Sexual Activity   • Alcohol use: Yes     Comment: occ   • Drug use: No   • Sexual activity: Not Currently   Other Topics Concern   • Not on file   Social History Narrative   • Not on file     Social Determinants of Health     Financial Resource Strain: Not on file   Food Insecurity: Not on file   Transportation Needs: Not on file   Physical Activity: Not on file   Stress: Not on file   Social Connections: Not on file   Intimate Partner Violence: Not on file   Housing Stability: Not on file       Current Outpatient Medications:   •  meloxicam (Mobic) 15 mg tablet, Take 1 tablet (15 mg total) by mouth daily for 14 days, Disp: 14 tablet, Rfl: 0  No Known Allergies  Vitals:    11/03/22 1420   BP: 117/76   Pulse: 86   Resp: 17   Temp: 98 9 °F (37 2 °C)   SpO2: 97%       Physical Exam  Constitutional:       General: She is not in acute distress  Appearance: Normal appearance  Cardiovascular:      Rate and Rhythm: Normal rate and regular rhythm  Pulses: Normal pulses  Heart sounds: Normal heart sounds  Pulmonary:      Effort: Pulmonary effort is normal       Breath sounds: Normal breath sounds  Chest:      Chest wall: No mass     Breasts:      Right: No swelling, bleeding, inverted nipple, mass, nipple discharge, skin change, tenderness, axillary adenopathy or supraclavicular adenopathy  Left: Mass present  No swelling, bleeding, inverted nipple, nipple discharge, skin change, tenderness, axillary adenopathy or supraclavicular adenopathy  Comments: Left breast nodularity noted at the 3 o'clock position 7 cm from the nipple  She has a right breast lumpectomy and sln bx scar  Noskin changes, nipple changes or discharge, or adenopathy appreciated on physical exam      Abdominal:      General: Abdomen is flat  Palpations: Abdomen is soft  Lymphadenopathy:      Upper Body:      Right upper body: No supraclavicular, axillary or pectoral adenopathy  Left upper body: No supraclavicular, axillary or pectoral adenopathy  Skin:     General: Skin is warm  Neurological:      General: No focal deficit present  Mental Status: She is alert and oriented to person, place, and time  Psychiatric:         Mood and Affect: Mood normal          Behavior: Behavior normal            Results:    Imaging  No results found  I reviewed the above imaging data  Advance Care Planning/Advance Directives:  Discussed disease status, cancer treatment plans and/or cancer treatment goals with the patient

## 2022-11-10 ENCOUNTER — HOSPITAL ENCOUNTER (OUTPATIENT)
Dept: RADIOLOGY | Facility: HOSPITAL | Age: 62
End: 2022-11-10
Attending: ORTHOPAEDIC SURGERY

## 2022-11-10 ENCOUNTER — OFFICE VISIT (OUTPATIENT)
Dept: OBGYN CLINIC | Facility: CLINIC | Age: 62
End: 2022-11-10

## 2022-11-10 VITALS
HEIGHT: 63 IN | WEIGHT: 167.8 LBS | DIASTOLIC BLOOD PRESSURE: 79 MMHG | BODY MASS INDEX: 29.73 KG/M2 | SYSTOLIC BLOOD PRESSURE: 113 MMHG | HEART RATE: 84 BPM

## 2022-11-10 DIAGNOSIS — M75.21 BICEPS TENDINITIS OF RIGHT UPPER EXTREMITY: Primary | ICD-10-CM

## 2022-11-10 DIAGNOSIS — M75.01 ADHESIVE CAPSULITIS OF RIGHT SHOULDER: ICD-10-CM

## 2022-11-10 DIAGNOSIS — M75.21 BICEPS TENDINITIS OF RIGHT UPPER EXTREMITY: ICD-10-CM

## 2022-11-10 RX ORDER — ACETAMINOPHEN 500 MG
500 TABLET ORAL EVERY 6 HOURS PRN
COMMUNITY

## 2022-11-10 NOTE — PROGRESS NOTES
[address] [facility] [address]  [phone]       Rome Garcia  [id number]  1960    ORTHOPAEDIC SURGERY OUTPATIENT NOTE  11/10/2022      HISTORY:  58 y o  female who presents the office today for an initial evalaution of her right shoulder  She states that her right shoulder pain began in August 2022 after throwing a tennis ball to her dog  She states that she will experience a daily intermittent sharp pain which is exacerbated with overhead activity reaching behind her back and sleeping  She states that she has used meloxicam to help alleviate her pain  She states that she is right-hand dominant  She denies any numbness or tingling of her right upper extremity  Past Medical History:   Diagnosis Date   • Abnormal Pap smear of cervix    • Anxiety 5/12/2020   • BRCA1 negative    • [facility] ) 02/06/2009    right breast   • Cancer (HCC)     Right breast cancer R lymph node bx neg  Last assessed: 6/4/13   • Carpal tunnel syndrome    • Chronic kidney disease    • History of radiation therapy 03/2009   • Kidney stone     Last assessed: 1/7/16   • Major depressive disorder with single episode, in full remission ([facility] ) 9/5/2018   • Obesity (BMI 30 0-34  9)    • Osteoporosis    • Sleep apnea     Patient can't use CPAP       Past Surgical History:   Procedure Laterality Date   • BLADDER SUSPENSION     • BREAST BIOPSY Right 2009   • BREAST LUMPECTOMY Right 02/06/2009    malignant   • CHOLECYSTECTOMY     • COLONOSCOPY     • HAND SURGERY     • LITHOTRIPSY Left    • [place] COLONOSCOPY FLX DX W/COLLJ SPEC WHEN PFRMD N/A 7/10/2017    Procedure: COLONOSCOPY;  Surgeon: Breann Sanders MD;  [facility];   Service: Gastroenterology   • [place] EXC SKIN BENIG 0 6-1 CM TRUNK,ARM,LEG Left 10/23/2019    Procedure: UPPER EXTREMITY LESION/MASS EXCISION BIOPSY;  Surgeon: Liborio Dewitt MD;  [facility];  Service: Orthopedics   • MO REPAIR INTERCARP/CARP-METACARP JT Left 10/23/2019    Procedure: THUMB SUSPENSIONPLASTY;  Surgeon: Carmne Zazueta MD;  [facility];  Service: Orthopedics   • MO WRIST Samuel FITZPATRICK Left 10/3/2018    Procedure: ENDOSCOPIC CARPAL TUNNEL RELEASE;  Surgeon: Carmen Zazueta MD;  [facility];  Service: Orthopedics   • SENTINEL LYMPH NODE BIOPSY     • WRIST SURGERY         Social History     Socioeconomic History   • Marital status: /Civil Union     Spouse name: Not on file   • Number of children: Not on file   • Years of education: Not on file   • Highest education level: Not on file   Occupational History   • Not on file   Tobacco Use   • Smoking status: Never Smoker   • Smokeless tobacco: Never Used   Vaping Use   • Vaping Use: Never used   Substance and Sexual Activity   • Alcohol use: Yes     Comment: occ   • Drug use: No   • Sexual activity: Not Currently   Other Topics Concern   • Not on file   Social History Narrative   • Not on file     Social Determinants of Health     Financial Resource Strain: Not on file   Food Insecurity: Not on file   Transportation Needs: Not on file   Physical Activity: Not on file   Stress: Not on file   Social Connections: Not on file   Intimate Partner Violence: Not on file   Housing Stability: Not on file       Family History   Problem Relation Age of Onset   • Skin cancer Mother    • Hypertension Mother    • Skin cancer Father    • Hypertension Father    • Prostate cancer Father 79        2 bouts   • Diabetes Sister         Mellitus   • Breast cancer Sister 40   • Uterine cancer Sister 48   • Skin cancer Sister    • Colon polyps Sister         Sigmoid   • Cancer Family    • Thyroid disease Family         Disorder   • Breast cancer Maternal Aunt 40        mets   • Breast cancer Cousin    • No Known Problems Daughter    • No Known Problems Maternal Grandmother    • No Known Problems Maternal Grandfather    • No Known Problems Paternal Grandmother    • No Known Problems Paternal Grandfather    • No Known Problems Daughter         Patient's Medications   New Prescriptions    No medications on file   Previous Medications    MELOXICAM (MOBIC) 15 MG TABLET    Take 1 tablet (15 mg total) by mouth daily for 14 days   Modified Medications    No medications on file   Discontinued Medications    No medications on file       No Known Allergies     Wt 76 1 kg (167 lb 12 8 oz)   BMI 29 72 kg/m²      REVIEW OF SYSTEMS:  Constitutional: Negative  HEENT: Negative  Respiratory: Negative  Skin: Negative  Neurological: Negative  Psychiatric/Behavioral: Negative  Musculoskeletal: Negative except for that mentioned in the HPI  PHYSICAL EXAM:     Wt 76 1 kg (167 lb 12 8 oz)   BMI 29 72 kg/m²   Gen: Alert and oriented to person, place, time  HEENT: EOMI, eyes clear, moist mucus membranes, hearing intact  Respiratory: Bilateral chest rise   No audible wheezing found  Cardiovascular: Regular Rate and Rhythm  Abdomen: soft nontender/nondistended    LEFT SHOULDER:     NVI axillary/medial/radial/ulnar and sensory intact     Forward flexion:   180 degrees   Abduction:  180 degrees   External rotation at 90 degrees abduction:   90 degrees   Internal rotation at 90 degrees abduction:  90 degrees   External rotation at 0 degrees:   70 degrees   Internal rotation: T7     STRENGTH:  Forward flexion:  5/5   Abduction:  5/5   External rotation:  5/5   Internal rotation:  5/5        Speed test: Negative  Yergason's: Negative   Tender to palpation ACJ (acromioclavicular joint): Negative   Tender to palpation LHB (long head of biceps): Negative  Chavez test: Negative  Macoupin test: Negative  Hornblower's: Negative  Lift off: Negative  Belly press: Negative  Bear hug: Negative  External lag sign: Negative  Cross-body adduction: Negative  Sulcus sign: Negative  Ann Marie's test: Negative  Drop arm test: negative     RIGHT SHOULDER:     NVI axillary/medial/radial/ulnar and sensory intact     Forward flexion:   160 degrees   Abduction: 85 degrees   External rotation at 90 degrees abduction:  90 degrees   Internal rotation at 90 degrees abduction:   70 degrees   External rotation at 0 degrees:  50 degrees   Internal rotation: L5     STRENGTH:  Forward flexion:  5/5   Abduction:  5/5   External rotation:  5/5   Internal rotation:  5/5     Speed test: positive  Yergason's: Negative   Tender to palpation ACJ: Negative   Tender to palpation LHB: positive   Chavez test: Negative  Archer's: mildly positive  Hornblower's: Negative  Lift off: mildly positive  Belly press: Negative  Bear hug: Negative  External lag sign: Negative  Cross-body adduction: Negative  Sulcus sign: Negative  Ann Marie's test: Negative  Drop arm test: Negative     Reflexes:  Brachioradialis:  Symmetric bilaterally  Triceps: Symmetric bilaterally  Biceps: Symmetric bilaterally  Patella tendon: Symmetric bilaterally  Achilles tendon: Symmetric bilaterally  Babinski's: Negative  Yomaira sign: Negative     No scapular winging or dyskinesis     Capillary refill brisk   Full ROM of elbows bilaterally      Skin:  No ecchymosis/abrasion/erythema/warmth     Cervical spine:   Full rotation, side bending, flexion and extension without radiating pain  Spurling's: Negative    IMAGING:  X-rays performed in the office today of her right shoulder demonstrates no acute fractures, dislocations, lytic or blastic lesions  ASSESSMENT AND PLAN: 58 y o  female right shoulder biceps tendinitis and early adhesive capsulitis     X-rays were reviewed with the patient today's visit  Non-operative treatments were discussed with the patient in the forms of formal physical therapy and continue taking the Mobic as directed  A prescription for formal physical therapy was provided to the patient at today's visit    I discussed with the patient as she experiences no improvement of her symptoms after completing formal physical therapy we can consider a right shoulder corticosteroid injection  I will follow-up with her in 2 months for a clinical re-evaluation  She understood and had no further questions      Scribe Attestation    I,:  Bari Gonzalez am acting as a scribe while in the presence of the attending physician :       I,:  Lorenza Veliz personally performed the services described in this documentation    as scribed in my presence :

## 2022-12-13 ENCOUNTER — HOSPITAL ENCOUNTER (OUTPATIENT)
Dept: MAMMOGRAPHY | Facility: CLINIC | Age: 62
Discharge: HOME | End: 2022-12-13

## 2022-12-13 ENCOUNTER — HOSPITAL ENCOUNTER (OUTPATIENT)
Dept: ULTRASOUND IMAGING | Facility: CLINIC | Age: 62
Discharge: HOME | End: 2022-12-13

## 2022-12-13 VITALS — WEIGHT: 167 LBS | BODY MASS INDEX: 29.59 KG/M2 | HEIGHT: 63 IN

## 2022-12-13 DIAGNOSIS — N63.0 MASS OF BREAST, UNSPECIFIED LATERALITY: ICD-10-CM

## 2022-12-14 ENCOUNTER — TELEPHONE (OUTPATIENT)
Dept: SURGICAL ONCOLOGY | Facility: CLINIC | Age: 62
End: 2022-12-14

## 2022-12-15 ENCOUNTER — VBI (OUTPATIENT)
Dept: ADMINISTRATIVE | Facility: OTHER | Age: 62
End: 2022-12-15

## 2022-12-28 DIAGNOSIS — N63.0 MASS OF BREAST, UNSPECIFIED LATERALITY: Primary | ICD-10-CM

## 2022-12-29 ENCOUNTER — TELEPHONE (OUTPATIENT)
Dept: SURGICAL ONCOLOGY | Facility: CLINIC | Age: 62
End: 2022-12-29

## 2023-01-01 NOTE — PROGRESS NOTES
CHIEF COMPLAINT:  Chief Complaint   Patient presents with    Left Hand - Follow-up       SUBJECTIVE:  Tere Banda is a 62y o  year old RHD female who presents 2 weeks after her second injection into the Left 1st CMC joint  She states that she is doing much better with this  She does not notice any pain in her carpal tunnel region  She still gets some numbness and tinligng at night though  She has been wearing a brace at night  She denies any constitutional signs or symptoms  PAST MEDICAL HISTORY:  Past Medical History:   Diagnosis Date    Cancer [facility])     Right breast cancer R lymph node bx neg  Last assessed: 6/4/13    Carpal tunnel syndrome     Kidney stone     Last assessed: 1/7/16    Osteoporosis     Sleep apnea        PAST SURGICAL HISTORY:  Past Surgical History:   Procedure Laterality Date    BLADDER SUSPENSION      BREAST LUMPECTOMY Right     CHOLECYSTECTOMY      COLONOSCOPY      LITHOTRIPSY Left     NH COLONOSCOPY FLX DX W/COLLJ SPEC WHEN PFRMD N/A 7/10/2017    Procedure: COLONOSCOPY;  Surgeon: Jayna Babb MD;  [facility];   Service: Gastroenterology    SENTINEL LYMPH NODE BIOPSY         FAMILY HISTORY:  Family History   Problem Relation Age of Onset    Skin cancer Mother    Michael Hero Hypertension Mother     Skin cancer Father     Hypertension Father     Diabetes Sister         Mellitus    Breast cancer Sister     Uterine cancer Sister     Skin cancer Sister     Colon polyps Sister         Sigmoid    Cancer Family     Thyroid disease Family         Disorder       SOCIAL HISTORY:  Social History   Substance Use Topics    Smoking status: Never Smoker    Smokeless tobacco: Never Used    Alcohol use Yes      Comment: Rare       MEDICATIONS:    Current Outpatient Prescriptions:     Alendronate Sodium (FOSAMAX PO), Take 700 mg by mouth once a week, Disp: , Rfl:     Cholecalciferol (VITAMIN D PO), Take 50,000 Units by mouth once a week, Disp: , Rfl:     clobetasol (OLUX) 0 05 % topical foam, , Disp: , Rfl:     Fluocinolone Acetonide Scalp 0 01 % OIL, , Disp: , Rfl:     PARoxetine (PAXIL) 10 mg tablet, Take 1 tablet (10 mg total) by mouth daily, Disp: 90 tablet, Rfl: 0    ALLERGIES:  No Known Allergies    REVIEW OF SYSTEMS:  Review of Systems   ROS:   General: no fever, no chills  HEENT:  No loss of hearing or eyesight problems  Eyes:  No red eyes  Respiratory:  No coughing, shortness of breath or wheezing  Cardiovascular:  No chest pain, no palpitations  GI:  Abdomen soft nontender, denies nausea  Endocrine:  No muscle weakness, no frequent urination, no excessive thirst  Urinary:  No dysuria, no incontinence  Musculoskeletal: see HPI and PE  SKIN:  No skin rash, no dry skin  Neurological:  No headaches, no confusion  Psychiatric:  No suicide thoughts, no anxiety, no depression  Review of all other systems is negative    VITALS:  Vitals:    09/24/18 1522   BP: 106/71   Pulse: 74       LABS:  HgA1c: No results found for: HGBA1C  BMP:   Lab Results   Component Value Date    CALCIUM 8 9 08/23/2018     08/23/2018    K 4 3 08/23/2018    CO2 27 08/23/2018     08/23/2018    BUN 13 08/23/2018    CREATININE 0 84 08/23/2018       _____________________________________________________  PHYSICAL EXAMINATION:  General: well developed and well nourished, alert, oriented times 3 and appears comfortable  Psychiatric: Normal  HEENT: Trachea Midline, No torticollis  Cardiac:  Regular rate and rhythm with no murmurs  Pulmonary:   Clear to auscultation with no wheezing or rhonchi  Skin: No masses, erthema, lacerations, fluctation, ulcerations  Neurovascular:   Decreased sensation with median nerve testing    MUSCULOSKELETAL EXAMINATION:  No erythema edema or ecchymosis noted  Skin is warm to touch  Range of motion and strength within normal limits  Positive Tinel sign for median nerve  Positive median nerve compression test    ___________________________________________________  STUDIES REVIEWED:  No studies reviewed  PROCEDURES PERFORMED:  Procedures       _____________________________________________________  ASSESSMENT/PLAN:        Carpal tunnel syndrome on left  -     Case request operating room: RELEASE CARPAL TUNNEL ENDOSCOPIC Left; Standing  -     Case request operating room: RELEASE CARPAL TUNNEL ENDOSCOPIC Left        Other orders  -     Void on call to OR; Standing  -     Insert peripheral IV; Standing  -     Diet NPO; Sips with meds; Standing    Patient will be scheduled for a left arthroscopic carpal tunnel release  Risks and benefits to surgery were discussed with the patient in detail  Patient understands risks and benefits and wishes to proceed  All questions were answered to her satisfaction  We will get this scheduled for her at her convenience  She will follow-up after surgery       Follow Up:  Return for After surgery  To Do Next Visit:       General Discussions:  Carpal Tunnel Syndrome: The anatomy and physiology of carpal tunnel syndrome was discussed with the patient today  Increase pressure localized under the transverse carpal ligament can cause pain, numbness, tingling, or dysesthesias within the median nerve distribution as well as feelings of fatigue, clumsiness, or awkwardness  These symptoms typically occur at night and worse in the morning upon waking  Eventually, untreated carpal tunnel syndrome can result in weakness and permanent loss of muscle within the thenar compartment of the hand  Treatment options were discussed with the patient  Conservative treatment includes nocturnal resting splints to keep the nerve in a neutral position, ergonomic changes within the work or home environment, activity modification, and tendon gliding exercises  Vitamin B6 one tablet daily over the counter may helpful to reduce symptoms     Steroid injections within the carpal canal can help a majority of patients, however this is often self-limited in a majority of patients  Surgical intervention to divide the transverse carpal ligament typically results in a long-lasting relief of the patient's complaints, with the recurrence rate of less than 1%  Operative Discussions:  Standard Consent: The risks and benefits of the procedure were explained to the patient, which include, but are not limited to: Bleeding, infection, recurrence, pain, scar, damage to tendons, damage to nerves, and damage to blood vessels, failure to give desired results and complications related to anesthesia  These risks, along with alternative conservative treatment options, and postoperative protocols were voiced back and understood by the patient  All questions were answered to the patient's satisfaction  The patient agrees to comply with a standard postoperative protocol, and is willing to proceed  Education was provided via written and auditory forms  There were no barriers to learning  Written handouts regarding wound care, incision and scar care, and general preoperative information was provided to the patient  Prior to surgery, the patient may be requested to stop all anti-inflammatory medications  Prophylactic aspirin, Plavix, and Coumadin may be allowed to be continued  Medications including vitamin E , ginkgo, and fish oil are requested to be stopped approximately one week prior to surgery  Hypertensive medications and beta blockers, if taken, should be continued      Scribe Attestation    I,:   Lucero Solorio PA-C am acting as a scribe while in the presence of the attending physician :        I,:   Shona Banks MD personally performed the services described in this documentation    as scribed in my presence : N/A

## 2023-01-27 ENCOUNTER — TELEPHONE (OUTPATIENT)
Dept: HEMATOLOGY ONCOLOGY | Facility: CLINIC | Age: 63
End: 2023-01-27

## 2023-01-27 NOTE — TELEPHONE ENCOUNTER
Appointment Cancellation Or Reschedule     Person calling in Patient   If someone other than patient calling, are they listed on the communication consent form? na   Provider Tessy Hardy, [address]   Office Visit Date and Time 12/20/22 @ 11:30am   Office Visit Location [facility]   Did patient want to reschedule their office appointment? If so, when was it scheduled to? Yes 1/30/23 @ 1pm   Did you have STAR scheduled for this appointment? no   Do you need STAR set up for your new appointment? If yes, please send to "PATIENT RIDESHARE" pool for STAR rescheduling no   Is this patient calling to reschedule an infusion appointment? no   When is their next infusion appointment? na   Is this patient a Chemo patient? no   Reason for Cancellation or Reschedule Patient was unable to keep appointment due to work schedule     If the patient is cancelling an appointment and needs their STAR Transport cancelled, please route to [facility]  If the patient is a treatment patient, please route this to the office nurse  If the patient is not on treatment, please route to the Clerical pool based on location  If the patient is a surgical oncology patient, please route to surg/onc clinical pool  Route message as high priority if same day cancellation

## 2023-01-30 ENCOUNTER — OFFICE VISIT (OUTPATIENT)
Dept: SURGICAL ONCOLOGY | Facility: CLINIC | Age: 63
End: 2023-01-30

## 2023-01-30 VITALS
TEMPERATURE: 97.6 F | RESPIRATION RATE: 20 BRPM | WEIGHT: 169 LBS | DIASTOLIC BLOOD PRESSURE: 70 MMHG | SYSTOLIC BLOOD PRESSURE: 114 MMHG | BODY MASS INDEX: 29.95 KG/M2 | OXYGEN SATURATION: 98 % | HEIGHT: 63 IN | HEART RATE: 84 BPM

## 2023-01-30 DIAGNOSIS — N63.0 MASS OF BREAST, UNSPECIFIED LATERALITY: Primary | ICD-10-CM

## 2023-01-30 NOTE — PROGRESS NOTES
Surgical Oncology Follow Up       [address]  [facility] [place]  [address] [facility] [address]  [place] [address]    Lloyd Camejo  1960  [id number]  [address] [facility]  [facility] [place]  [address] [place] [address]    No chief complaint on file  Assessment/Plan:  1  Mass of breast, unspecified laterality  - 6 month follow up       Discussion/Summary: Patient is a 58-year-old female presenting for overdue 1 month follow-up for left breast lump  She has a history of right breast cancer diagnosed in 2009 in which she underwent excisional biopsy which revealed DCIS with microinvasion and ADH  She had a right lumpectomy with sentinel node biopsy with Dr Emory Sanders  She completed whole breast radiation therapy and 5 years of tamoxifen  She had genetic testing which was negative  Her lifetime TC risk score with hx of ADH is 16%  I sent her for diagnostic imaging on 12/13/2022 in which was BI-RADS 3 category 2 density  A palpable lump on the left side is most compatible with a lipoma visible with ultrasound  There was an incidental small left breast upper outer asymmetry and 6-month follow-up mammogram was recommended for surveillance  It has been ordered and scheduled for June  There were no concerns on her cbe  I will see the patient back in 6 months or sooner should the need arise  She was instructed to call with any questions or concerns prior to this time  All questions were answered today  History of Present Illness:     Oncology History    No history exists         -Interval History: Patient is a 58-year-old female presenting for overdue 1 month follow-up for left breast lump  She had diagnostic imaging on 12/13/2022 in which was BI-RADS 3 category 2 density  A palpable lump on the left side is most compatible with a lipoma visible with ultrasound    There was an incidental small left breast upper outer asymmetry  Patient denies changes on her breast exam      Review of Systems:  Review of Systems   Constitutional: Negative for activity change, appetite change, fatigue and unexpected weight change  Respiratory: Negative for cough and shortness of breath  Cardiovascular: Negative for chest pain  Gastrointestinal: Negative for abdominal pain, diarrhea, nausea and vomiting  Endocrine: Negative for heat intolerance  Musculoskeletal: Negative for arthralgias, back pain and myalgias  Skin: Negative for rash  Neurological: Negative for weakness and headaches  Hematological: Negative for adenopathy  Patient Active Problem List   Diagnosis   • Prinzmetal's angina (HCC)   • Environmental and seasonal allergies   • Hyperlipidemia   • Obstructive sleep apnea   • Vitamin D deficiency   • Osteopenia   • Breast lump     Past Medical History:   Diagnosis Date   • Abnormal Pap smear of cervix    • Anxiety 5/12/2020   • BRCA1 negative    • Breast cancer ([facility] ) 02/06/2009    right breast   • Cancer ([facility] )     Right breast cancer R lymph node bx neg  Last assessed: 6/4/13   • Carpal tunnel syndrome    • Chronic kidney disease    • History of radiation therapy 03/2009   • Kidney stone     Last assessed: 1/7/16   • Major depressive disorder with single episode, in full remission [facility] ) 9/5/2018   • Obesity (BMI 30 0-34  9)    • Osteoporosis    • Sleep apnea     Patient can't use CPAP     Past Surgical History:   Procedure Laterality Date   • BLADDER SUSPENSION     • BREAST BIOPSY Right 2009   • BREAST LUMPECTOMY Right 02/06/2009    malignant   • CHOLECYSTECTOMY     • COLONOSCOPY     • HAND SURGERY     • LITHOTRIPSY Left    • UT ARTHRP INTERPOS INTERCARPAL/METACARPAL JOINTS Left 10/23/2019    Procedure: THUMB SUSPENSIONPLASTY;  Surgeon: Turner Cadena MD;  [facility];  Service: Orthopedics   • UT COLONOSCOPY FLX DX W/COLLJ SPEC WHEN PFRMD N/A 7/10/2017    Procedure: COLONOSCOPY;  Surgeon: Michael Huff MD; [facility];   Service: Gastroenterology   • NC EXC B9 LESION MRGN XCP SK TG T/A/L 0 6-1 0 CM Left 10/23/2019    Procedure: UPPER EXTREMITY LESION/MASS EXCISION BIOPSY;  Surgeon: Anastasia Stein MD;  [facility];  Service: Orthopedics   • NC [address] [facility] WRST SURG W/RLS TRANSVRS CARPL LIGM Left 10/3/2018    Procedure: ENDOSCOPIC CARPAL TUNNEL RELEASE;  Surgeon: Anastasia Stein MD;  [facility];  Service: Orthopedics   • SENTINEL LYMPH NODE BIOPSY     • WRIST SURGERY       Family History   Problem Relation Age of Onset   • Skin cancer Mother    • Hypertension Mother    • Skin cancer Father    • Hypertension Father    • Prostate cancer Father 79        2 bouts   • Diabetes Sister         Mellitus   • Breast cancer Sister 40   • Uterine cancer Sister 48   • Skin cancer Sister    • Colon polyps Sister         Sigmoid   • Cancer Family    • Thyroid disease Family         Disorder   • Breast cancer Maternal Aunt 40        mets   • Breast cancer Cousin    • No Known Problems Daughter    • No Known Problems Maternal Grandmother    • No Known Problems Maternal Grandfather    • No Known Problems Paternal Grandmother    • No Known Problems Paternal Grandfather    • No Known Problems Daughter      Social History     Socioeconomic History   • Marital status: /Civil Union     Spouse name: Not on file   • Number of children: Not on file   • Years of education: Not on file   • Highest education level: Not on file   Occupational History   • Not on file   Tobacco Use   • Smoking status: Never   • Smokeless tobacco: Never   Vaping Use   • Vaping Use: Never used   Substance and Sexual Activity   • Alcohol use: Yes     Comment: occ   • Drug use: No   • Sexual activity: Not Currently   Other Topics Concern   • Not on file   Social History Narrative   • Not on file     Social Determinants of Health     Financial Resource Strain: Not on file   Food Insecurity: Not on file   Transportation Needs: Not on file   Physical Activity: Not on file   Stress: Not on file   Social Connections: Not on file   Intimate Partner Violence: Not on file   Housing Stability: Not on file       Current Outpatient Medications:   •  acetaminophen (TYLENOL) 500 mg tablet, Take 500 mg by mouth every 6 (six) hours as needed for mild pain, Disp: , Rfl:   •  meloxicam (Mobic) 15 mg tablet, Take 1 tablet (15 mg total) by mouth daily for 14 days, Disp: 14 tablet, Rfl: 0  No Known Allergies  There were no vitals filed for this visit  Physical Exam  Constitutional:       General: She is not in acute distress  Appearance: Normal appearance  Cardiovascular:      Rate and Rhythm: Normal rate and regular rhythm  Pulses: Normal pulses  Heart sounds: Normal heart sounds  Pulmonary:      Effort: Pulmonary effort is normal       Breath sounds: Normal breath sounds  Chest:      Chest wall: No mass  Breasts:     Right: No swelling, bleeding, inverted nipple, mass, nipple discharge, skin change or tenderness  Left: No swelling, bleeding, inverted nipple, mass, nipple discharge, skin change or tenderness  Comments: Left breast lipoma  Right breast lumpectomy scar  No masses, nodularity, skin changes, nipple changes or discharge, or adenopathy appreciated on physical exam      Abdominal:      General: Abdomen is flat  Palpations: Abdomen is soft  Lymphadenopathy:      Upper Body:      Right upper body: No supraclavicular, axillary or pectoral adenopathy  Left upper body: No supraclavicular, axillary or pectoral adenopathy  Skin:     General: Skin is warm  Neurological:      General: No focal deficit present  Mental Status: She is alert and oriented to person, place, and time  Psychiatric:         Mood and Affect: Mood normal          Behavior: Behavior normal            Results:    Imaging  No results found  I reviewed the above imaging data        Advance Care Planning/Advance Directives:  Discussed disease status, cancer treatment plans and/or cancer treatment goals with the patient

## 2023-03-15 NOTE — RESULT NOTES
Verified Results  (1) CELIAC DISEASE AB PROFILE [id number] 07:46AM Lord Whipple   TW Order Number: [id number]     Test Name Result Flag Reference   tTG IGG <2 U/mL  0 - 5   Negative        0 - 5                                Weak Positive   6 - 9                                Positive           >9   tTG IGA <2 U/mL  0 - 3   Negative        0 -  3                                Weak Positive   4 - 10                                Positive           >10   Tissue Transglutaminase (tTG) has been identified   as the endomysial antigen  Studies have demonstr-   ated that endomysial IgA antibodies have over 99%   specificity for gluten sensitive enteropathy     GLIADA 3 units  0 - 19   Negative                   0 - 19                     Weak Positive             20 - 30                     Moderate to Strong Positive   >30   GLIADG 2 units  0 - 19   Negative                   0 - 19                     Weak Positive             20 - 30                     Moderate to Strong Positive   >30   ENDOMYSIAL AB IGA Negative  Negative   Performed at:  [address] [place] [address]  [id number]  : Catherine Flores MD, Phone:  [phone]    mg/dL  [phone]  352
Yes

## 2023-06-13 ENCOUNTER — HOSPITAL ENCOUNTER (OUTPATIENT)
Dept: MAMMOGRAPHY | Facility: CLINIC | Age: 63
Discharge: HOME | End: 2023-06-13
Payer: COMMERCIAL

## 2023-06-13 ENCOUNTER — HOSPITAL ENCOUNTER (OUTPATIENT)
Dept: ULTRASOUND IMAGING | Facility: CLINIC | Age: 63
Discharge: HOME | End: 2023-06-13
Payer: COMMERCIAL

## 2023-06-13 VITALS — HEIGHT: 63 IN | BODY MASS INDEX: 29.95 KG/M2 | WEIGHT: 169 LBS

## 2023-06-13 DIAGNOSIS — R92.8 ABNORMAL MAMMOGRAM: ICD-10-CM

## 2023-06-13 DIAGNOSIS — N63.0 MASS OF BREAST, UNSPECIFIED LATERALITY: ICD-10-CM

## 2023-06-13 PROCEDURE — 76642 ULTRASOUND BREAST LIMITED: CPT

## 2023-06-13 PROCEDURE — G0279 TOMOSYNTHESIS, MAMMO: HCPCS

## 2023-06-13 PROCEDURE — 77065 DX MAMMO INCL CAD UNI: CPT

## 2023-06-19 ENCOUNTER — OFFICE VISIT (OUTPATIENT)
Dept: SURGICAL ONCOLOGY | Facility: CLINIC | Age: 63
End: 2023-06-19
Payer: COMMERCIAL

## 2023-06-19 VITALS
HEIGHT: 63 IN | BODY MASS INDEX: 30.12 KG/M2 | TEMPERATURE: 98 F | HEART RATE: 95 BPM | WEIGHT: 170 LBS | RESPIRATION RATE: 18 BRPM | DIASTOLIC BLOOD PRESSURE: 80 MMHG | OXYGEN SATURATION: 98 % | SYSTOLIC BLOOD PRESSURE: 102 MMHG

## 2023-06-19 DIAGNOSIS — N63.0 MASS OF BREAST, UNSPECIFIED LATERALITY: Primary | ICD-10-CM

## 2023-06-19 DIAGNOSIS — Z86.000 HISTORY OF DUCTAL CARCINOMA IN SITU (DCIS) OF BREAST: ICD-10-CM

## 2023-06-19 PROCEDURE — 99213 OFFICE O/P EST LOW 20 MIN: CPT

## 2023-06-19 NOTE — PROGRESS NOTES
Surgical Oncology Follow Up       [address] [place] [address]  CANCER CARE [facility] SURGICAL ONCOLOGY [place]  [address] [address]    Lloyd Camejo  1960  [id number]  [address] [place] [address],[address] [place] [address]  CANCER [facility] SURGICAL ONCOLOGY [place] [address] [address] [id number]    Chief Complaint   Patient presents with   • Follow-up       Assessment/Plan:  1  Mass of breast, unspecified laterality  - 6 month follow up  - Mammo diagnostic bilateral w 3d & cad; Future  - US breast left limited (diagnostic); Future  - US guided breast biopsy left complete; Future    2  History of ductal carcinoma in situ (DCIS) of breast    Discussion/Summary: Patient is a 66-year-old female presenting for a 6 month follow-up for left breast lump  She has a history of right breast cancer diagnosed in 2009 in which she underwent excisional biopsy which revealed DCIS with microinvasion and ADH  She had a right lumpectomy with sentinel node biopsy with Dr Jacqueline Kenny  She completed whole breast radiation therapy and 5 years of tamoxifen  She had genetic testing which was negative  She had a bilateral diagnostic mammogram and ultrasound of the left breast on 12/13/2022 which was BI-RADS 3 category 2 density- findings for palpable mass at last visit is compatible with lipoma  There was incidental small left outer breast asymmetry on mammogram which recommended 6-month follow-up  She had a diagnostic mammogram and ultrasound of the left breast on 6/13/2023 which was BI-RADS 3  There is a 6 mm oval mass with circumscribed margins seen in the upper outer quadrant of the left breast 5 cm from the nipple  This was slightly larger most recent imaging and more apparent  There was not an ultrasound correlate  There is also a cyst at the 12 o'clock position of the left breast  She will be due for bilateral mammography in 6 months as well as left breast ultrasound   Spoke with patient about results and she stated when she had her imaging she spoke with the radiologist who stated if she does not feel comfortable waiting 6 months for follow up breast imaging she may have a biopsy  I informed her that if she wants a biopsy we can arrange that  I will reach out to breast center today  There were no concerns on her cbe  I will see the patient back in 6 months or sooner should the need arise  She was instructed to call with any questions or concerns prior to this time  All questions were answered today  History of Present Illness:     Oncology History    No history exists         -Interval History: Patient is a 44-year-old female presenting for a 6 month follow-up for left breast lump  She had a diagnostic mammogram and ultrasound of the left breast on 6/13/2023 which was BI-RADS 3  There is a 6 mm oval mass with circumscribed margins seen in the upper outer quadrant of the left breast 5 cm from the nipple  This was slightly larger most recent imaging and more apparent  There was not an ultrasound correlate  She denies breast changes  She would like biopsy of probable benign finding to ensure this is not a cancer  Review of Systems:  Review of Systems   Constitutional: Negative for activity change, appetite change, fatigue and unexpected weight change  Respiratory: Negative for cough and shortness of breath  Cardiovascular: Negative for chest pain  Gastrointestinal: Negative for abdominal pain, diarrhea, nausea and vomiting  Endocrine: Negative for heat intolerance  Musculoskeletal: Negative for arthralgias, back pain and myalgias  Skin: Negative for rash  Neurological: Negative for weakness and headaches  Hematological: Negative for adenopathy         Patient Active Problem List   Diagnosis   • Prinzmetal's angina (HCC)   • Environmental and seasonal allergies   • Hyperlipidemia   • Obstructive sleep apnea   • Vitamin D deficiency   • Osteopenia   • Breast lump   • History of ductal carcinoma in situ (DCIS) of breast     Past Medical History:   Diagnosis Date   • Abnormal Pap smear of cervix    • Anxiety 5/12/2020   • BRCA1 negative    • Breast cancer ([facility] [address] ) 02/06/2009    right breast   • Cancer (HCC)     Right breast cancer R lymph node bx neg  Last assessed: 6/4/13   • Carpal tunnel syndrome    • Chronic kidney disease    • History of radiation therapy 03/2009   • Kidney stone     Last assessed: 1/7/16   • Major depressive disorder with single episode, in full remission ([facility] [address] ) 9/5/2018   • Obesity (BMI 30 0-34  9)    • Osteoporosis    • Sleep apnea     Patient can't use CPAP     Past Surgical History:   Procedure Laterality Date   • BLADDER SUSPENSION     • BREAST BIOPSY Right 2009   • BREAST LUMPECTOMY Right 02/06/2009    malignant   • CHOLECYSTECTOMY     • COLONOSCOPY     • HAND SURGERY     • LITHOTRIPSY Left    • NJ ARTHRP INTERPOS INTERCARPAL/METACARPAL JOINTS Left 10/23/2019    Procedure: THUMB SUSPENSIONPLASTY;  Surgeon: Ann Johnson MD;  Location: [facility];  Service: Orthopedics   • NJ COLONOSCOPY FLX DX W/COLLJ SPEC WHEN PFRMD N/A 7/10/2017    Procedure: COLONOSCOPY;  Surgeon: Conor Fragoso MD;  Location: [facility];   Service: Gastroenterology   • NJ EXC B9 LESION MRGN XCP SK TG T/A/L 0 6-1 0 CM Left 10/23/2019    Procedure: UPPER EXTREMITY LESION/MASS EXCISION BIOPSY;  Surgeon: Ann Johnson MD;  Location: [facility];  Service: Orthopedics   • [place] [address] WRST SURG W/RLS TRANSVRS CARPL LIGM Left 10/3/2018    Procedure: ENDOSCOPIC CARPAL TUNNEL RELEASE;  Surgeon: Ann Johnson MD;  Location: [facility];  Service: Orthopedics   • SENTINEL LYMPH NODE BIOPSY     • WRIST SURGERY       Family History   Problem Relation Age of Onset   • Skin cancer Mother    • Hypertension Mother    • Skin cancer Father    • Hypertension Father    • Prostate cancer Father 79        2 bouts   • Diabetes Sister         Mellitus   • Breast cancer Sister 40   • Uterine cancer Sister 48   • Skin cancer Sister    • Colon polyps Sister Sigmoid   • Cancer Family    • Thyroid disease Family         Disorder   • Breast cancer Maternal Aunt 40        mets   • Breast cancer Cousin    • No Known Problems Daughter    • No Known Problems Maternal Grandmother    • No Known Problems Maternal Grandfather    • No Known Problems Paternal Grandmother    • No Known Problems Paternal Grandfather    • No Known Problems Daughter      Social History     Socioeconomic History   • Marital status: /Civil Union     Spouse name: Not on file   • Number of children: Not on file   • Years of education: Not on file   • Highest education level: Not on file   Occupational History   • Not on file   Tobacco Use   • Smoking status: Never   • Smokeless tobacco: Never   Vaping Use   • Vaping Use: Never used   Substance and Sexual Activity   • Alcohol use: Yes     Comment: occ   • Drug use: No   • Sexual activity: Not Currently   Other Topics Concern   • Not on file   Social History Narrative   • Not on file     Social Determinants of Health     Financial Resource Strain: Not on file   Food Insecurity: Not on file   Transportation Needs: Not on file   Physical Activity: Not on file   Stress: Not on file   Social Connections: Not on file   Intimate Partner Violence: Not on file   Housing Stability: Not on file       Current Outpatient Medications:   •  acetaminophen (TYLENOL) 500 mg tablet, Take 500 mg by mouth every 6 (six) hours as needed for mild pain (Patient not taking: Reported on 6/19/2023), Disp: , Rfl:   •  meloxicam (Mobic) 15 mg tablet, Take 1 tablet (15 mg total) by mouth daily for 14 days (Patient not taking: Reported on 6/19/2023), Disp: 14 tablet, Rfl: 0  No Known Allergies  Vitals:    06/19/23 1447   Temp: 98 °F (36 7 °C)       Physical Exam  Constitutional:       General: She is not in acute distress  Appearance: Normal appearance  Cardiovascular:      Rate and Rhythm: Normal rate and regular rhythm  Pulses: Normal pulses        Heart sounds: Normal heart sounds  Pulmonary:      Effort: Pulmonary effort is normal       Breath sounds: Normal breath sounds  Chest:      Chest wall: No mass  Breasts:     Right: No swelling, bleeding, inverted nipple, mass, nipple discharge, skin change or tenderness  Left: No swelling, bleeding, inverted nipple, mass, nipple discharge, skin change or tenderness  Abdominal:      General: Abdomen is flat  Palpations: Abdomen is soft  Lymphadenopathy:      Upper Body:      Right upper body: No supraclavicular, axillary or pectoral adenopathy  Left upper body: No supraclavicular, axillary or pectoral adenopathy  Skin:     General: Skin is warm  Neurological:      General: No focal deficit present  Mental Status: She is alert and oriented to person, place, and time  Psychiatric:         Mood and Affect: Mood normal          Behavior: Behavior normal            Results:    Imaging  Mammo diagnostic left w 3d & cad, US breast left limited (diagnostic)    Result Date: 6/13/2023  Narrative: DIAGNOSIS: Mass of breast, unspecified laterality TECHNIQUE: Digital diagnostic mammography was performed  Computer Aided Detection (CAD) analyzed all applicable images  Ultrasound of the left breast(s) was performed  COMPARISONS: Prior breast imaging dated: 12/13/2022, 12/13/2022, 08/31/2021, 05/13/2020, 12/20/2018, 12/18/2017, 12/12/2016, 06/08/2016, 12/09/2015, 05/14/2014, and 05/13/2013 RELEVANT HISTORY: Family Breast Cancer History: History of breast cancer in Sister, Maternal Aunt, Cousin  Family Medical History: Family medical history includes breast cancer in 3 relatives (cousin, maternal aunt, sister)  Personal History: Hormone history includes birth control and tamoxifen  Surgical history includes breast biopsy and lumpectomy  Medical history includes breast cancer and BRCA 1 negative   RISK ASS[facility]MENT[facility] was [facility] due to the patient's history and/or demographic factors  TISSUE DENSITY: There are scattered areas of fibroglandular density  INDICATION: Charly Capps is a 58 y o  female presenting for follow up for abnormal mammo  FINDINGS: LEFT B) MASS Mammo diagnostic left w 3d & cad: There is a 6 mm oval mass with circumscribed margins seen in the upper outer quadrant of the left breast, 5 cm from the nipple  This small finding is slightly larger on today's study and now more apparent, visible on MLO view placing it approximately 1:00 in position  Targeted ultrasound is not able to identify a definitive correlate  There is a cyst that is closer to 12:00 which is a potential correlate but there are numerous small superficial echogenic nodules within the breast most consistent with the appearance of lipomas  While lipomas are most times lucent on mammography, they can occasionally have a more dense appearance  Overall this is very slowly changing and otherwise circumscribed nodule continues to favor a benign etiology  Patient is due for bilateral mammography in 6 months at which time this can be reassessed  Images and findings were personally reviewed and discussed with the patient     Impression: Small circumscribed nodule with mild interval increase in size compared to the prior  See above for details  Recommend 6-month follow-up mammogram at time of bilateral exam ASSESSMENT/BI-RADS CATEGORY: Left: 3 - Probably Benign Overall: 3 - Probably Benign RECOMMENDATION:      - Ultrasound in 6 months for the left breast       - Diagnostic mammogram in 6 months for both breasts  Workstation ID: [id number]       I reviewed the above imaging data  Advance Care Planning/Advance Directives:  Discussed disease status, cancer treatment plans and/or cancer treatment goals with the patient

## 2023-06-20 NOTE — PROGRESS NOTES
Spoke with patient   regarding recommendation for;    _____ RIGHT ___x___LEFT      __x___Ultrasound guided  ______Stereotactic breast biopsy. __X___Verbalized understanding.       Blood thinners:  No: ___x__ Yes: ______ What:                 Biopsy teaching sheet given:  Yes: ___X___ No: ________    Pt given contact information and adv to call with any questions/needs

## 2023-07-12 ENCOUNTER — HOSPITAL ENCOUNTER (OUTPATIENT)
Dept: MAMMOGRAPHY | Facility: CLINIC | Age: 63
Discharge: HOME | End: 2023-07-12
Payer: COMMERCIAL

## 2023-07-12 ENCOUNTER — HOSPITAL ENCOUNTER (OUTPATIENT)
Dept: ULTRASOUND IMAGING | Facility: CLINIC | Age: 63
Discharge: HOME | End: 2023-07-12
Payer: COMMERCIAL

## 2023-07-12 VITALS — SYSTOLIC BLOOD PRESSURE: 141 MMHG | HEART RATE: 83 BPM | DIASTOLIC BLOOD PRESSURE: 93 MMHG

## 2023-07-12 VITALS — SYSTOLIC BLOOD PRESSURE: 138 MMHG | HEART RATE: 86 BPM | DIASTOLIC BLOOD PRESSURE: 81 MMHG

## 2023-07-12 DIAGNOSIS — R92.8 ABNORMAL MAMMOGRAM: ICD-10-CM

## 2023-07-12 DIAGNOSIS — N63.0 MASS OF BREAST, UNSPECIFIED LATERALITY: ICD-10-CM

## 2023-07-12 PROCEDURE — A4648 IMPLANTABLE TISSUE MARKER: HCPCS

## 2023-07-12 PROCEDURE — 19081 BX BREAST 1ST LESION STRTCTC: CPT

## 2023-07-12 PROCEDURE — 88305 TISSUE EXAM BY PATHOLOGIST: CPT | Performed by: PATHOLOGY

## 2023-07-12 RX ORDER — LIDOCAINE HYDROCHLORIDE AND EPINEPHRINE BITARTRATE 20; .01 MG/ML; MG/ML
9 INJECTION, SOLUTION SUBCUTANEOUS ONCE
Status: COMPLETED | OUTPATIENT
Start: 2023-07-12 | End: 2023-07-12

## 2023-07-12 RX ORDER — LIDOCAINE HYDROCHLORIDE 10 MG/ML
5 INJECTION, SOLUTION EPIDURAL; INFILTRATION; INTRACAUDAL; PERINEURAL ONCE
Status: COMPLETED | OUTPATIENT
Start: 2023-07-12 | End: 2023-07-12

## 2023-07-12 RX ORDER — LIDOCAINE HYDROCHLORIDE 10 MG/ML
5 INJECTION, SOLUTION EPIDURAL; INFILTRATION; INTRACAUDAL; PERINEURAL ONCE
Status: DISCONTINUED | OUTPATIENT
Start: 2023-07-12 | End: 2023-07-13 | Stop reason: HOSPADM

## 2023-07-12 RX ADMIN — LIDOCAINE HYDROCHLORIDE AND EPINEPHRINE 10 ML: 20; 10 INJECTION, SOLUTION INFILTRATION; PERINEURAL at 10:17

## 2023-07-12 RX ADMIN — LIDOCAINE HYDROCHLORIDE 5 ML: 10 INJECTION, SOLUTION EPIDURAL; INFILTRATION; INTRACAUDAL; PERINEURAL at 10:17

## 2023-07-12 NOTE — PROGRESS NOTES
Procedure type:    _____ultrasound guided ____x_stereotactic    Breast:    ___x__Left _____Right    Location: 2 o'clock    Needle: 8G Revolve    # of passes: 6 cores all submitted    Clip: Mammomark U shape    Performed by: Dr. Britney Malloy held for 5 minutes by: Jina Mcmillan    Steri Strips:    ___X__yes _____no    Band aid:    __X___yes_____no    Tolerated procedure:    __X___yes _____no

## 2023-07-13 ENCOUNTER — TELEPHONE (OUTPATIENT)
Dept: SURGICAL ONCOLOGY | Facility: CLINIC | Age: 63
End: 2023-07-13

## 2023-07-13 PROCEDURE — 88305 TISSUE EXAM BY PATHOLOGIST: CPT | Performed by: PATHOLOGY

## 2023-07-13 NOTE — TELEPHONE ENCOUNTER
Called and spoke with patient regarding biopsy results. I explained that although the findings are benign, in the setting of a mass lesion, this is concerning for possible sclerosing lesion or radial scar, and excision of this area is recommended. I have offered her an appointment with Dr. Vlad Ames on August 9 which she accepted. I have explained that this appointment will just be for consultation and that surgery can be scheduled at that time. She is agreeable to the plan, all questions were answered.

## 2023-07-13 NOTE — PROGRESS NOTES
Post procedure call completed 07/13/23 @ 0811    Bleeding: _____yes __X___no    Pain: _____yes ___X___no    Redness/Swelling: ______yes ___X___no    Band aid removed: _____yes ___X__no (discussed removing when she showers)    Steri-Strips intact: ___X___yes _____no (discussed with patient to remove steri strips on . .. if they have not come off on their own)    Pt with no questions at this time, adv will call when results available, adv to call with any questions or concerns, has name/# for contact

## 2023-07-18 ENCOUNTER — VBI (OUTPATIENT)
Dept: ADMINISTRATIVE | Facility: OTHER | Age: 63
End: 2023-07-18

## 2023-08-04 PROBLEM — N63.0 BREAST LUMP: Status: RESOLVED | Noted: 2022-11-03 | Resolved: 2023-08-04

## 2023-08-09 ENCOUNTER — OFFICE VISIT (OUTPATIENT)
Dept: LAB | Facility: CLINIC | Age: 63
End: 2023-08-09
Payer: COMMERCIAL

## 2023-08-09 ENCOUNTER — OFFICE VISIT (OUTPATIENT)
Dept: SURGICAL ONCOLOGY | Facility: CLINIC | Age: 63
End: 2023-08-09
Payer: COMMERCIAL

## 2023-08-09 ENCOUNTER — APPOINTMENT (OUTPATIENT)
Dept: LAB | Facility: CLINIC | Age: 63
End: 2023-08-09
Payer: COMMERCIAL

## 2023-08-09 VITALS
OXYGEN SATURATION: 98 % | SYSTOLIC BLOOD PRESSURE: 136 MMHG | RESPIRATION RATE: 18 BRPM | TEMPERATURE: 97.2 F | HEART RATE: 85 BPM | HEIGHT: 63 IN | BODY MASS INDEX: 31.71 KG/M2 | DIASTOLIC BLOOD PRESSURE: 84 MMHG | WEIGHT: 179 LBS

## 2023-08-09 DIAGNOSIS — Z01.818 PRE-OPERATIVE EXAMINATION: ICD-10-CM

## 2023-08-09 DIAGNOSIS — N64.89 RADIAL SCAR OF LEFT BREAST: Primary | ICD-10-CM

## 2023-08-09 DIAGNOSIS — Z86.000 HISTORY OF DUCTAL CARCINOMA IN SITU (DCIS) OF BREAST: ICD-10-CM

## 2023-08-09 DIAGNOSIS — G47.33 OBSTRUCTIVE SLEEP APNEA (ADULT) (PEDIATRIC): ICD-10-CM

## 2023-08-09 DIAGNOSIS — Z01.818 PREOP EXAMINATION: ICD-10-CM

## 2023-08-09 LAB
ANION GAP SERPL CALCULATED.3IONS-SCNC: 6 MMOL/L
BASOPHILS # BLD AUTO: 0.04 THOUSANDS/ÂΜL (ref 0–0.1)
BASOPHILS NFR BLD AUTO: 1 % (ref 0–1)
BUN SERPL-MCNC: 11 MG/DL (ref 5–25)
CALCIUM SERPL-MCNC: 9.1 MG/DL (ref 8.4–10.2)
CHLORIDE SERPL-SCNC: 108 MMOL/L (ref 96–108)
CO2 SERPL-SCNC: 28 MMOL/L (ref 21–32)
CREAT SERPL-MCNC: 0.78 MG/DL (ref 0.6–1.3)
EOSINOPHIL # BLD AUTO: 0.22 THOUSAND/ÂΜL (ref 0–0.61)
EOSINOPHIL NFR BLD AUTO: 3 % (ref 0–6)
ERYTHROCYTE [DISTWIDTH] IN BLOOD BY AUTOMATED COUNT: 13.1 % (ref 11.6–15.1)
GFR SERPL CREATININE-BSD FRML MDRD: 81 ML/MIN/1.73SQ M
GLUCOSE SERPL-MCNC: 90 MG/DL (ref 65–140)
HCT VFR BLD AUTO: 39.6 % (ref 34.8–46.1)
HGB BLD-MCNC: 13.1 G/DL (ref 11.5–15.4)
IMM GRANULOCYTES # BLD AUTO: 0.02 THOUSAND/UL (ref 0–0.2)
IMM GRANULOCYTES NFR BLD AUTO: 0 % (ref 0–2)
LYMPHOCYTES # BLD AUTO: 2.33 THOUSANDS/ÂΜL (ref 0.6–4.47)
LYMPHOCYTES NFR BLD AUTO: 31 % (ref 14–44)
MCH RBC QN AUTO: 30.3 PG (ref 26.8–34.3)
MCHC RBC AUTO-ENTMCNC: 33.1 G/DL (ref 31.4–37.4)
MCV RBC AUTO: 92 FL (ref 82–98)
MONOCYTES # BLD AUTO: 0.51 THOUSAND/ÂΜL (ref 0.17–1.22)
MONOCYTES NFR BLD AUTO: 7 % (ref 4–12)
NEUTROPHILS # BLD AUTO: 4.32 THOUSANDS/ÂΜL (ref 1.85–7.62)
NEUTS SEG NFR BLD AUTO: 58 % (ref 43–75)
NRBC BLD AUTO-RTO: 0 /100 WBCS
PLATELET # BLD AUTO: 227 THOUSANDS/UL (ref 149–390)
PMV BLD AUTO: 9.9 FL (ref 8.9–12.7)
POTASSIUM SERPL-SCNC: 4.3 MMOL/L (ref 3.5–5.3)
RBC # BLD AUTO: 4.32 MILLION/UL (ref 3.81–5.12)
SODIUM SERPL-SCNC: 142 MMOL/L (ref 135–147)
WBC # BLD AUTO: 7.44 THOUSAND/UL (ref 4.31–10.16)

## 2023-08-09 PROCEDURE — 80048 BASIC METABOLIC PNL TOTAL CA: CPT

## 2023-08-09 PROCEDURE — 93005 ELECTROCARDIOGRAM TRACING: CPT

## 2023-08-09 PROCEDURE — 85025 COMPLETE CBC W/AUTO DIFF WBC: CPT

## 2023-08-09 PROCEDURE — 36415 COLL VENOUS BLD VENIPUNCTURE: CPT

## 2023-08-09 PROCEDURE — 99214 OFFICE O/P EST MOD 30 MIN: CPT | Performed by: SURGERY

## 2023-08-09 NOTE — PROGRESS NOTES
Surgical Oncology Follow Up       [address] [facility] SURGICAL ONCOLOGY [facility]  [address]  [place] [address]    Lloyd Camejo  1960  [id number]  [address] [facility] SURGICAL ONCOLOGY [facility]  [address] [place] [address]    No chief complaint on file. Assessment & Plan:   Patient presents for a follow-up visit. I have reviewed her notes from Dr. Ann Bermudez describe this is a very slightly change and otherwise circumscribed nodule continues to favor benign etiology. This was biopsied and shown to be benign usual ductal hyperplasia no atypia however there is a cavity around pathology report that if this is a mass associated lesion they could not exclude a radial scar. I have discussed this personally with Dr. Kenrick Magallon who felt that this was quite reasonable to follow given his benign appearance and benign pathology. After review with pathology as well as the radiologist I concur that I think it is very safe to follow this. We will coordinate the imaging and see her back in 6 months. The patient prefers this approach as opposed to surgical excision after discussion. Cancer History:     Oncology History    No history exists. Interval History:   See above, presents with her daughter. The patient understanding was that I would recommend excising a radial scar however after reviewing the information in detail I think this is unlikely to be a radial scar and most likely is a benign finding and concur that serial observation is a very appropriate option. Review of Systems:   Review of Systems   All other systems reviewed and are negative.       Past Medical History     Patient Active Problem List   Diagnosis   • Prinzmetal's angina (HCC)   • Environmental and seasonal allergies   • Hyperlipidemia   • Obstructive sleep apnea   • Vitamin D deficiency   • Osteopenia   • History of ductal carcinoma in situ (DCIS) of breast     Past Medical History:   Diagnosis Date   • Abnormal Pap smear of cervix    • Anxiety 5/12/2020   • BRCA1 negative    • Breast cancer ([address]) 02/06/2009    right breast   • Cancer (HCC)     Right breast cancer R lymph node bx neg. Last assessed: 6/4/13   • Carpal tunnel syndrome    • Chronic kidney disease    • History of radiation therapy 03/2009   • Kidney stone     Last assessed: 1/7/16   • Major depressive disorder with single episode, in full remission ([address]) 9/5/2018   • Obesity (BMI 30.0-34. 9)    • Osteoporosis    • Sleep apnea     Patient can't use CPAP     Past Surgical History:   Procedure Laterality Date   • BLADDER SUSPENSION     • BREAST BIOPSY Right 2009   • BREAST LUMPECTOMY Right 02/06/2009    malignant   • CHOLECYSTECTOMY     • COLONOSCOPY     • HAND SURGERY     • LITHOTRIPSY Left    • MAMMO STEREOTACTIC BREAST BIOPSY LEFT (ALL INC) Left 7/12/2023   • VT ARTHRP INTERPOS INTERCARPAL/METACARPAL JOINTS Left 10/23/2019    Procedure: THUMB SUSPENSIONPLASTY;  Surgeon: Clarisa Carrington MD;  [facility]: [facility];  Service: Orthopedics   • VT COLONOSCOPY FLX DX W/COLLJ SPEC WHEN PFRMD N/A 7/10/2017    Procedure: COLONOSCOPY;  Surgeon: Aleksander Lund MD;  Location: [facility] LAB;   Service: Gastroenterology   • VT EXC B9 LESION MRGN XCP SK TG T/A/L 0.6-1.0 CM Left 10/23/2019    Procedure: UPPER EXTREMITY LESION/MASS EXCISION BIOPSY;  Surgeon: Clarisa Carrington MD;  Location: [facility];  Service: Orthopedics   • [place] [address] WRST SURG W/RLS TRANSVRS CARPL LIGM Left 10/3/2018    Procedure: ENDOSCOPIC CARPAL TUNNEL RELEASE;  Surgeon: Clarisa Carrington MD;  Location: [facility];  Service: Orthopedics   • SENTINEL LYMPH NODE BIOPSY     • WRIST SURGERY       Family History   Problem Relation Age of Onset   • Skin cancer Mother    • Hypertension Mother    • Skin cancer Father    • Hypertension Father    • Prostate cancer Father 79        2 bouts   • Diabetes Sister         Mellitus   • Breast cancer Sister 40   • Uterine cancer Sister 48   • Skin cancer Sister    • Colon polyps Sister         Sigmoid   • Cancer Family    • Thyroid disease Family         Disorder   • Breast cancer Maternal Aunt 40        mets   • Breast cancer Cousin    • No Known Problems Daughter    • No Known Problems Maternal Grandmother    • No Known Problems Maternal Grandfather    • No Known Problems Paternal Grandmother    • No Known Problems Paternal Grandfather    • No Known Problems Daughter      Social History     Socioeconomic History   • Marital status: /Civil Union     Spouse name: Not on file   • Number of children: Not on file   • Years of education: Not on file   • Highest education level: Not on file   Occupational History   • Not on file   Tobacco Use   • Smoking status: Never   • Smokeless tobacco: Never   Vaping Use   • Vaping Use: Never used   Substance and Sexual Activity   • Alcohol use: Yes     Comment: occ   • Drug use: No   • Sexual activity: Not Currently   Other Topics Concern   • Not on file   Social History Narrative   • Not on file     Social Determinants of Health     Financial Resource Strain: Not on file   Food Insecurity: Not on file   Transportation Needs: Not on file   Physical Activity: Not on file   Stress: Not on file   Social Connections: Not on file   Intimate Partner Violence: Not on file   Housing Stability: Not on file       Current Outpatient Medications:   •  acetaminophen (TYLENOL) 500 mg tablet, Take 500 mg by mouth every 6 (six) hours as needed for mild pain (Patient not taking: Reported on 6/19/2023), Disp: , Rfl:   •  meloxicam (Mobic) 15 mg tablet, Take 1 tablet (15 mg total) by mouth daily for 14 days (Patient not taking: Reported on 6/19/2023), Disp: 14 tablet, Rfl: 0  No Known Allergies    Physical Exam:     Vitals:    08/09/23 1351   BP: 136/84   Pulse: 85   Resp: 18   Temp: (!) 97.2 °F (36.2 °C)   SpO2: 98%     Physical Exam  Vitals reviewed. Constitutional:       Appearance: She is well-developed. HENT:      Head: Normocephalic and atraumatic. Eyes:      Pupils: Pupils are equal, round, and reactive to light. Neck:      Thyroid: No thyromegaly. Vascular: No JVD. Trachea: No tracheal deviation. Cardiovascular:      Rate and Rhythm: Normal rate and regular rhythm. Heart sounds: Normal heart sounds. No murmur heard. No friction rub. No gallop. Pulmonary:      Effort: Pulmonary effort is normal. No respiratory distress. Breath sounds: Normal breath sounds. No wheezing or rales. Abdominal:      General: There is no distension. Palpations: Abdomen is soft. There is no hepatomegaly or mass. Tenderness: There is no abdominal tenderness. There is no guarding or rebound. Musculoskeletal:         General: No tenderness. Normal range of motion. Cervical back: Normal range of motion and neck supple. Lymphadenopathy:      Cervical: No cervical adenopathy. Skin:     General: Skin is warm and dry. Findings: No erythema or rash. Neurological:      Mental Status: She is alert and oriented to person, place, and time. Cranial Nerves: No cranial nerve deficit. Psychiatric:         Behavior: Behavior normal.           Results & Discussion:   Patient has a history of having breast cancer in 2009 but no evidence of local regional or distant recurrent disease. Patient's ultrasound was already coordinated for 12/14/2023 as is her mammogram and she has a follow-up appoint with our nurse practitioner [address] . all questions were answered the patient's satisfaction as well as those of her daughters. I provided her a copy of her pathology report and reviewed it with her in detail. Advance Care Planning/Advance Directives:  I discussed the disease status, treatment plans and follow-up with the patient.

## 2023-08-11 LAB
ATRIAL RATE: 71 BPM
P AXIS: 41 DEGREES
PR INTERVAL: 136 MS
QRS AXIS: 9 DEGREES
QRSD INTERVAL: 86 MS
QT INTERVAL: 418 MS
QTC INTERVAL: 454 MS
T WAVE AXIS: 30 DEGREES
VENTRICULAR RATE: 71 BPM

## 2023-08-11 PROCEDURE — 93010 ELECTROCARDIOGRAM REPORT: CPT | Performed by: INTERNAL MEDICINE

## 2023-08-14 ENCOUNTER — ANESTHESIA EVENT (OUTPATIENT)
Dept: PERIOP | Facility: AMBULARY SURGERY CENTER | Age: 63
End: 2023-08-14
Payer: COMMERCIAL

## 2023-08-24 NOTE — PRE-PROCEDURE INSTRUCTIONS
No outpatient medications have been marked as taking for the 8/28/23 encounter [facility] HOSPITAL Encounter). Medication instructions for day surgery reviewed. Please use only a sip of water to take your instructed medications. Avoid all over the counter vitamins, supplements and NSAIDS for one week prior to surgery per anesthesia guidelines. Tylenol is ok to take as needed. You will receive a call one business day prior to surgery with an arrival time and hospital directions. If your surgery is scheduled on a Monday, the hospital will be calling you on the Friday prior to your surgery. If you have not heard from anyone by 8pm, please call the hospital supervisor through the hospital  at [phone]. Teresa Parker [phone]). Do not eat or drink anything after midnight the night before your surgery, including candy, mints, lifesavers, or chewing gum. Do not drink alcohol 24hrs before your surgery. Try not to smoke at least 24hrs before your surgery. Follow the pre surgery showering instructions as listed in the [facility] Surgical Experience Booklet” or otherwise provided by your surgeon's office. Do not shave the surgical area 24 hours before surgery. Do not apply any lotions, creams, including makeup, cologne, deodorant, or perfumes after showering on the day of your surgery. No contact lenses, eye make-up, or artificial eyelashes. Remove nail polish, including gel polish, and any artificial, gel, or acrylic nails if possible. Remove all jewelry including rings and body piercing jewelry. Wear causal clothing that is easy to take on and off. Consider your type of surgery. Keep any valuables, jewelry, piercings at home. Please bring any specially ordered equipment (sling, braces) if indicated. Arrange for a responsible person to drive you to and from the hospital on the day of your surgery. Visitor Guidelines discussed.      Call the surgeon's office with any new illnesses, exposures, or additional questions prior to surgery. Please reference your [facility] Surgical Experience Booklet” for additional information to prepare for your upcoming surgery.

## 2023-08-28 ENCOUNTER — ANESTHESIA (OUTPATIENT)
Dept: PERIOP | Facility: AMBULARY SURGERY CENTER | Age: 63
End: 2023-08-28
Payer: COMMERCIAL

## 2023-08-28 ENCOUNTER — HOSPITAL ENCOUNTER (OUTPATIENT)
Facility: AMBULARY SURGERY CENTER | Age: 63
Setting detail: OUTPATIENT SURGERY
Discharge: HOME | End: 2023-08-28
Attending: OTOLARYNGOLOGY | Admitting: OTOLARYNGOLOGY
Payer: COMMERCIAL

## 2023-08-28 VITALS
WEIGHT: 177 LBS | OXYGEN SATURATION: 97 % | BODY MASS INDEX: 31.36 KG/M2 | HEIGHT: 63 IN | SYSTOLIC BLOOD PRESSURE: 120 MMHG | TEMPERATURE: 97.7 F | DIASTOLIC BLOOD PRESSURE: 67 MMHG | HEART RATE: 73 BPM | RESPIRATION RATE: 18 BRPM

## 2023-08-28 PROCEDURE — 42975 DISE EVAL SLP DO BRTH FLX DX: CPT | Performed by: OTOLARYNGOLOGY

## 2023-08-28 RX ORDER — PROPOFOL 10 MG/ML
INJECTION, EMULSION INTRAVENOUS CONTINUOUS PRN
Status: DISCONTINUED | OUTPATIENT
Start: 2023-08-28 | End: 2023-08-28

## 2023-08-28 RX ORDER — SODIUM CHLORIDE, SODIUM LACTATE, POTASSIUM CHLORIDE, CALCIUM CHLORIDE 600; 310; 30; 20 MG/100ML; MG/100ML; MG/100ML; MG/100ML
INJECTION, SOLUTION INTRAVENOUS CONTINUOUS PRN
Status: DISCONTINUED | OUTPATIENT
Start: 2023-08-28 | End: 2023-08-28

## 2023-08-28 RX ORDER — FENTANYL CITRATE/PF 50 MCG/ML
25 SYRINGE (ML) INJECTION
Status: DISCONTINUED | OUTPATIENT
Start: 2023-08-28 | End: 2023-08-28 | Stop reason: HOSPADM

## 2023-08-28 RX ORDER — ONDANSETRON 2 MG/ML
4 INJECTION INTRAMUSCULAR; INTRAVENOUS ONCE AS NEEDED
Status: DISCONTINUED | OUTPATIENT
Start: 2023-08-28 | End: 2023-08-28 | Stop reason: HOSPADM

## 2023-08-28 RX ORDER — ACETAMINOPHEN 325 MG/1
650 TABLET ORAL EVERY 6 HOURS PRN
Status: DISCONTINUED | OUTPATIENT
Start: 2023-08-28 | End: 2023-08-28 | Stop reason: HOSPADM

## 2023-08-28 RX ADMIN — PROPOFOL 50 MCG/KG/MIN: 10 INJECTION, EMULSION INTRAVENOUS at 07:46

## 2023-08-28 RX ADMIN — SODIUM CHLORIDE, SODIUM LACTATE, POTASSIUM CHLORIDE, CALCIUM CHLORIDE: 600; 310; 30; 20 INJECTION, SOLUTION INTRAVENOUS at 07:42

## 2023-08-28 NOTE — DISCHARGE INSTR - AVS FIRST PAGE
Drug Induced Sleep Endoscopy     WHAT YOU SHOULD KNOW:   During a drug induced sleep endsocopy (DISE), your caregiver places a scope into your nose to see inside your nose and throat. You may need a DISE to find the cause of your sleep apnea. DISE helps your caregiver diagnose your condition and create a treatment plan. You might also have surgery or other treatments during a DISE. AFTER YOU LEAVE:     Follow up with your primary healthcare provider or specialist as directed:  Write down your questions so you remember to ask them during your visits. Medicines:   Keep a current list of your medicines:  Include the amounts, and when, how, and why you take them. Take the list or the pill bottles to follow-up visits. Carry your medicine list with you in case of an emergency. Throw away old medicine lists. Use vitamins, herbs, or food supplements only as directed. Take your medicine as directed:  Call your healthcare provider if you think your medicine is not working as expected. Tell him about any medicine allergies, and if you want to quit taking or change your medicine. Nutrition:  Most people eat and drink as usual after a laryngoscopy. Ask your primary healthcare provider if you are not sure. Contact your primary healthcare provider if:  You have problems breathing or talking. You see new injuries to your teeth, lips, or tongue. Your pain does not go away or gets worse. You have questions about your procedure, medicine, or care. If you have any questions or concerns during business hours please call our office at [phone].  After hours please call the surgeon on call or Dr. Erich Galindo at [phone]

## 2023-08-28 NOTE — H&P
Major Barry is a 58 y. o.female who presents for re-evaluation of NEDRA. Patient of Dr. Nader Green referred from [place]. HST demonstrated an AHI of 23.6. She has been using CPAP for the past 7 years and recently discontinued using do to difficulty tolerating mask. No nasal obstruction. Past Medical History:   Diagnosis Date   • Abnormal Pap smear of cervix    • Anxiety 5/12/2020   • BRCA1 negative    • Breast cancer ([address]) 02/06/2009    right breast   • Cancer (HCC)     Right breast cancer R lymph node bx neg. Last assessed: 6/4/13   • Carpal tunnel syndrome    • Chronic kidney disease    • History of radiation therapy 03/2009   • Kidney stone     Last assessed: 1/7/16   • Major depressive disorder with single episode, in full remission ([address]) 9/5/2018   • Obesity (BMI 30.0-34. 9)    • Osteoporosis    • Sleep apnea     Patient can't use CPAP       /58   Pulse 80   Temp 97.7 °F (36.5 °C) (Temporal)   Resp 18   Ht 5' 3" (1.6 m)   Wt 80.3 kg (177 lb)   SpO2 99%   BMI 31.35 kg/m²       Physical Exam   Constitutional: Oriented to person, place, and time. Well-developed and well-nourished, no apparent distress, non-toxic appearance. Cooperative, able to hear and answer questions without difficulty. Voice: Normal voice quality. Head: Normocephalic, atraumatic. No scars, masses or lesions. Face: Symmetric, no edema, no sinus tenderness. Eyes: Vision grossly intact, extra-ocular movement intact. Ears: External ear normal.  Bilateral tympanic membranes are intact with intact normal landmarks. No post-auricular erythema or tenderness. Nose: Septum midline, nares clear. Mucosa moist, turbinates well appearing. No crusting, polyps or discharge evident. Oral cavity: Dentition intact. Mucosa moist, lips normal.  Tongue mobile, floor of mouth normal.  Hard palate unremarkable. No masses or lesions. Oropharynx: Uvula is midline, soft palate normal.  Unremarkable oropharyngeal inlet. Tonsils unremarkable. Posterior pharyngeal wall clear. No masses or lesions. Salivary glands:  Parotid glands and submandibular glands symmetric, no enlargement or tenderness. Neck: Normal laryngeal elevation with swallow. Trachea midline. No masses or lesions. No palpable adenopathy. Thyroid: normal in size, unremarkable without tenderness or palpable nodules. Pulmonary/Chest: Normal effort and rate. No respiratory distress. Musculoskeletal: Normal range of motion. Neurological: Cranial nerves 2-12 intact. Skin: Skin is warm and dry. Psychiatric: Normal mood and affect. CTAB  RRR  ABD soft NTND      A/P: Obstructive sleep apnea: We discussed the nature of obstructive sleep apnea. We discussed the natural history of sleep apnea. We discussed options for management. We discussed non-surgical management including weight loss, mandibular advancement devices, and positive airway pressure therapy including various options. We discussed that she is not tolerating her CPAP and has at least moderate NEDRA with an AHI of 23.6 and BMI of 31, she would like to move forward with Drug Induced Sleep Endoscopy to evaluate the source of the obstructions. We will plan for DISE and if necessary repeat sleep study if one has not been performed within 3 years. If surgical treatable causes of sleep apnea are seen such as tongue base laxity, we will consider options for surgical treatment. After the procedure we will further discuss surgical and non-surgical options, if necessary, at that time.

## 2023-08-28 NOTE — ANESTHESIA POSTPROCEDURE EVALUATION
Post-Op Assessment Note    CV Status:  Stable  Pain Score: 0    Pain management: adequate     Mental Status:  Alert and awake   Hydration Status:  Stable   PONV Controlled:  None   Airway Patency:  Patent      Post Op Vitals Reviewed: Yes      Staff: CRNA         No notable events documented.     /57 (08/28/23 0757)    Temp (!) 97.2 °F (36.2 °C) (08/28/23 0757)    Pulse 76 (08/28/23 0757)   Resp 18 (08/28/23 0757)    SpO2 96 % (08/28/23 0757)

## 2023-08-28 NOTE — ANESTHESIA PREPROCEDURE EVALUATION
Procedure:  DRUG INDUCED SLEEP ENDOSCOPY (Mouth)    Relevant Problems   CARDIO   (+) Hyperlipidemia   (+) Prinzmetal's angina (HCC)      MUSCULOSKELETAL   (+) Prinzmetal's angina (HCC)      PULMONARY   (+) Obstructive sleep apnea        Physical Exam    Airway    Mallampati score: II         Dental       Cardiovascular  Cardiovascular exam normal    Pulmonary  Pulmonary exam normal     Other Findings        Anesthesia Plan  ASA Score- 2     Anesthesia Type- IV sedation with anesthesia with ASA Monitors. Additional Monitors:   Airway Plan:           Plan Factors-Exercise tolerance (METS): >4 METS. Chart reviewed. EKG reviewed. Imaging results reviewed. Existing labs reviewed. Patient summary reviewed. Patient is not a current smoker. Patient not instructed to abstain from smoking on day of procedure. Induction- intravenous. Postoperative Plan-     Informed Consent- Anesthetic plan and risks discussed with patient. I personally reviewed this patient with the CRNA. Discussed and agreed on the Anesthesia Plan with the CRNA. Jason Levine

## 2023-08-28 NOTE — ANESTHESIA PREPROCEDURE EVALUATION
Procedure:  DRUG INDUCED SLEEP ENDOSCOPY (Mouth)    Relevant Problems   CARDIO   (+) Hyperlipidemia   (+) Prinzmetal's angina (HCC)      MUSCULOSKELETAL   (+) Prinzmetal's angina (HCC)      PULMONARY   (+) Obstructive sleep apnea

## 2023-08-28 NOTE — OP NOTE
OPERATIVE REPORT  PATIENT NAME: Kathya Shetty    :  1960  MRN: [id number]  Pt [facility]    SURGERY DATE: 2023    Surgeon(s) and Role:     * Alfonso Zimmer MD - Primary    Preop Diagnosis:  Obstructive sleep apnea (adult) (pediatric) [G47.33]    Post-Op Diagnosis Codes:     * Obstructive sleep apnea (adult) (pediatric) [G47.33]    Procedure(s):  DRUG INDUCED SLEEP ENDOSCOPY    Specimen(s):  * No specimens in log *    Estimated Blood Loss:   Minimal    Drains:  * No LDAs found *    Anesthesia Type:   General    Operative Indications:  Obstructive sleep apnea (adult) (pediatric) [G47.33]  BMI 31    Operative Findings:  V 2 AP  O 1 AP  T 2 AP  E 2 AP    Complications:   None    Procedure and Technique:    PREOPERATIVE DIAGNOSES: Obstructive sleep apnea with positive pressure   intolerance and persistent sleep apnea after CPAP trial    POSTOPERATIVE DIAGNOSES: Same    PROCEDURES: Drug-induced sleep endoscopy (flexible fiberoptic laryngoscopy   with examination under anesthesia). ANESTHESIA: IV sedation. ESTIMATED BLOOD LOSS: None. COMPLICATIONS: None. INDICATIONS: Kathya Shetty is a 58y.o. year-old female with a history of symptomatic obstructive sleep apnea, who is intolerant and unable to achieve benefit with positive pressure therapy. She presents today for drug-induced sleep endoscopy to better characterize her locations and pattern of obstruction and to predict appropriate medical and/or surgical options moving forward. FINDINGS: There was no evidence of complete concentric palatal obstruction and she does appear to be a candidate anatomically for hypoglossal nerve   stimulation therapy. DESCRIPTION OF PROCEDURE: Lloyd Camejo was brought to the operating room and was anesthetized via the standard drug-induced sleep endoscopy protocol. The propofol infusion rate was startedand gradually increased until conditions that mimic sleep were gradually observed. With the patient not responsive to verbal commands, but still with spontaneous respiration, sleep disordered breathing events and associated desaturations were clearly observed    Under these conditions, the flexible endoscope was inserted to examine both sides of the nose as well as the pharynx and larynx. The VOTE score at baseline was V: 2 complete AP; O: 1 partial AP; T: 2 complete AP; E: 2 complete AP. With simulated jaw advancement and tongue advancement, the hypopharyngeal obstruction and secondarily the palatal collapse also improved. In summary, there was no evidence of complete concentric palatal obstruction and she does appear to be a candidate anatomically for hypoglossal nerve stimulation therapy. The patient was then allowed to awaken while monitoring by anesthesia was performed until he was awake and able to maintain his own saturations. The patient was taken to the recovery area in stable condition. All instruments and sponge counts were correct at the end of the procedure. Milind Baldwin MD, was present for and performed all key elements of the procedure. I was present for the entire procedure. and A qualified resident physician was not available.     Patient Disposition:  PACU  and hemodynamically stable        SIGNATURE: Lashell Frederick MD  DATE: August 28, 2023  TIME: 7:55 AM

## 2023-10-19 ENCOUNTER — RA CDI HCC (OUTPATIENT)
Dept: OTHER | Facility: HOSPITAL | Age: 63
End: 2023-10-19

## 2023-10-19 NOTE — PROGRESS NOTES
[facility] coding opportunities       Chart reviewed, no opportunity found: CHART REVIEWED, NO OPPORTUNITY FOUND        Patients Insurance        Commercial Insurance: [employer]

## 2023-10-26 ENCOUNTER — OFFICE VISIT (OUTPATIENT)
Dept: FAMILY MEDICINE CLINIC | Facility: MEDICAL CENTER | Age: 63
End: 2023-10-26
Payer: COMMERCIAL

## 2023-10-26 VITALS
TEMPERATURE: 97.3 F | DIASTOLIC BLOOD PRESSURE: 70 MMHG | BODY MASS INDEX: 31.96 KG/M2 | OXYGEN SATURATION: 98 % | WEIGHT: 180.4 LBS | SYSTOLIC BLOOD PRESSURE: 126 MMHG | HEART RATE: 76 BPM | HEIGHT: 63 IN

## 2023-10-26 DIAGNOSIS — Z00.00 ANNUAL PHYSICAL EXAM: Primary | ICD-10-CM

## 2023-10-26 DIAGNOSIS — E78.2 MIXED HYPERLIPIDEMIA: ICD-10-CM

## 2023-10-26 DIAGNOSIS — Z13.29 THYROID DISORDER SCREEN: ICD-10-CM

## 2023-10-26 DIAGNOSIS — Z13.1 DIABETES MELLITUS SCREENING: ICD-10-CM

## 2023-10-26 DIAGNOSIS — Z23 ENCOUNTER FOR IMMUNIZATION: ICD-10-CM

## 2023-10-26 PROCEDURE — 90686 IIV4 VACC NO PRSV 0.5 ML IM: CPT

## 2023-10-26 PROCEDURE — 90471 IMMUNIZATION ADMIN: CPT

## 2023-10-26 PROCEDURE — 90472 IMMUNIZATION ADMIN EACH ADD: CPT

## 2023-10-26 PROCEDURE — 90677 PCV20 VACCINE IM: CPT

## 2023-10-26 PROCEDURE — 99396 PREV VISIT EST AGE 40-64: CPT

## 2023-10-26 NOTE — PROGRESS NOTES
ADULT ANNUAL [facility] [place]    NAME: Olga Camejo  AGE: 58 y.o. SEX: female  : 1960     DATE: 10/26/2023     Assessment and Plan:   Pap scheduled for November. Mammogram utd, next scheduled for December. Influenza and PCV 20 vaccines updated today. Colonoscopy up-to-date. Fasting lab orders placed, to be done earliest convenience. Encouraged patient to stop nighttime snacking, increase exercise and incorporate strength training exercises. We will follow-up in 3 months to recheck weight and discuss further recommendations. Problem List Items Addressed This Visit     Hyperlipidemia    Relevant Orders    Lipid panel    BMI 31.0-31.9,adult   Other Visit Diagnoses     Annual physical exam    -  Primary    Encounter for immunization        Relevant Orders    influenza vaccine, quadrivalent, 0.5 mL, preservative-free, for adult and pediatric patients 6 mos+ (AFLURIA, FLUARIX, FLULAVAL, FLUZONE) (Completed)    Pneumococcal Conjugate Vaccine 20-valent (Pcv20) (Completed)    Diabetes mellitus screening        Relevant Orders    Hemoglobin A1C    Thyroid disorder screen        Relevant Orders    TSH, 3rd generation with Free T4 reflex          Immunizations and preventive care screenings were discussed with patient today. Appropriate education was printed on patient's after visit summary. Counseling:  Alcohol/drug use: discussed moderation in alcohol intake, the recommendations for healthy alcohol use, and avoidance of illicit drug use. Dental Health: discussed importance of regular tooth brushing, flossing, and dental visits. Exercise: the importance of regular exercise/physical activity was discussed. Recommend exercise 3-5 times per week for at least 30 minutes. BMI Counseling: Body mass index is 31.96 kg/m².  The BMI is above normal. Nutrition recommendations include encouraging healthy choices of fruits and vegetables, consuming healthier snacks, moderation in carbohydrate intake and increasing intake of lean protein. Exercise recommendations include moderate physical activity 150 minutes/week, exercising 3-5 times per week and strength training exercises. No pharmacotherapy was ordered. Rationale for BMI follow-up plan is due to patient being overweight or obese. Stop night time snacking. Depression Screening and Follow-up Plan: Patient was screened for depression during today's encounter. They screened negative with a PHQ-2 score of 0. Return in about 3 months (around 1/26/2024) for Recheck weight loss efforts ovs.     Chief Complaint:     Chief Complaint   Patient presents with   • Annual Exam      History of Present Illness:     Adult Annual Physical   Patient here for a comprehensive physical exam.   She is doing well overall. She reports difficulty losing weight, has been at the steady weight for quite some time. She reports going to [address] in the past and doing the program with the nutritionist while doing that she made dietary changes, was exercising regularly and lost 13 pounds however after she plateaued she gave up and did not return. She admits to a healthy well balanced diet throughout the day but she is snacking at nighttime and not exercising. She also thinks it would help if she was held accountable. She was seen by eye doctor who noted an enlarged optic nerve in August who told her this could be a sign of diabetes, she would like to be screened for this. She does report family history of DM. Diet and Physical Activity  Diet/Nutrition: well balanced diet and frequent night time snacking . Exercise: no formal exercise. Depression Screening  PHQ-2/9 Depression Screening    Little interest or pleasure in doing things: 0 - not at all  Feeling down, depressed, or hopeless: 0 - not at all  PHQ-2 Score: 0  PHQ-2 Interpretation: Negative depression screen       General Health  Sleep: sleeps well.  Has NEDRA, intolerant to cpap, seeing Dr. Wang Matter for possible inspire implant. Hearing: normal - bilateral.  Vision: goes for regular eye exams and wears glasses. Dental: regular dental visits. /GYN Health  Patient is: postmenopausal       Review of Systems:     Review of Systems   Constitutional: Negative. HENT: Negative. Eyes: Negative. Respiratory: Negative. Cardiovascular: Negative. Gastrointestinal: Negative. Endocrine: Negative. Genitourinary: Negative. Musculoskeletal: Negative. Skin: Negative. Allergic/Immunologic: Negative. Neurological: Negative. Hematological: Negative. Psychiatric/Behavioral: Negative. Past Medical History:     Past Medical History:   Diagnosis Date   • Abnormal Pap smear of cervix    • Anxiety 5/12/2020   • BRCA1 negative    • Breast cancer ([address]) 02/06/2009    right breast   • Cancer (HCC)     Right breast cancer R lymph node bx neg. Last assessed: 6/4/13   • Carpal tunnel syndrome    • Chronic kidney disease    • History of radiation therapy 03/2009   • Kidney stone     Last assessed: 1/7/16   • Major depressive disorder with single episode, in full remission ([address]) 9/5/2018   • Obesity (BMI 30.0-34. 9)    • Osteoporosis    • Sleep apnea     Patient can't use CPAP      Past Surgical History:     Past Surgical History:   Procedure Laterality Date   • BLADDER SUSPENSION     • BREAST BIOPSY Right 2009   • BREAST LUMPECTOMY Right 02/06/2009    malignant   • CHOLECYSTECTOMY     • COLONOSCOPY     • EXAMINATION UNDER ANESTHESIA N/A 8/28/2023    Procedure: DRUG INDUCED SLEEP ENDOSCOPY;  Surgeon: Maud Brittle, MD;  Location: [facility] MAIN OR;  Service: ENT   • HAND SURGERY     • LITHOTRIPSY Left    • MAMMO STEREOTACTIC BREAST BIOPSY LEFT (ALL INC) Left 7/12/2023   • MA ARTHRP INTERPOS INTERCARPAL/METACARPAL JOINTS Left 10/23/2019    Procedure: Jonathan Rosas;  Surgeon: aPrk Kelley MD;  Location: [facility] MA[facility] OR;  Service: Orthopedics   • MA COLONOSCOPY FLX DX W/COLLJ SPEC WHEN PFRMD N/A 7/10/2017    Procedure: COLONOSCOPY;  Surgeon: Jaja Akbar MD;  Location: [facility] GI LAB;   Service: Gastroenterology   • WA EXC B9 LESION MRGN XCP SK TG T/A/L 0.6-1.0 CM Left 10/23/2019    Procedure: UPPER EXTREMITY LESION/MASS EXCISION BIOPSY;  Surgeon: Migel Menard MD;  Location[facility];  Service: Orthopedics   • [place] [address] WRST SURG W/RLS TRANSVRS CARPL LIGM Left 10/3/2018    Procedure: ENDOSCOPIC CARPAL TUNNEL RELEASE;  Surgeon: Migel Menard MD;  Location[facility] MA[facility];  Service: Orthopedics   • SENTINEL LYMPH NODE BIOPSY     • WRIST SURGERY        Social History:     Social History     Socioeconomic History   • Marital status: /Civil Union     Spouse name: None   • Number of children: None   • Years of education: None   • Highest education level: None   Occupational History   • None   Tobacco Use   • Smoking status: Never   • Smokeless tobacco: Never   Vaping Use   • Vaping Use: Never used   Substance and Sexual Activity   • Alcohol use: Yes     Comment: occ   • Drug use: No   • Sexual activity: Not Currently   Other Topics Concern   • None   Social History Narrative   • None     Social Determinants of Health     Financial Resource Strain: Not on file   Food Insecurity: Not on file   Transportation Needs: Not on file   Physical Activity: Not on file   Stress: Not on file   Social Connections: Not on file   Intimate Partner Violence: Not on file   Housing Stability: Not on file      Family History:     Family History   Problem Relation Age of Onset   • Skin cancer Mother    • Hypertension Mother    • Skin cancer Father    • Hypertension Father    • Prostate cancer Father 79        2 bouts   • Diabetes Sister         Mellitus   • Breast cancer Sister 40   • Uterine cancer Sister 48   • Skin cancer Sister    • Colon polyps Sister         Sigmoid   • Cancer Family    • Thyroid disease Family         Disorder   • Breast cancer Maternal Aunt 40        mets   • Breast cancer Cousin    • No Known Problems Daughter    • No Known Problems Maternal Grandmother    • No Known Problems Maternal Grandfather    • No Known Problems Paternal Grandmother    • No Known Problems Paternal Grandfather    • No Known Problems Daughter       Current Medications:     No current outpatient medications on file. No current facility-administered medications for this visit. Allergies:     No Known Allergies   Physical Exam:     /70 (BP Location: Left arm, Patient Position: Sitting, Cuff Size: Adult)   Pulse 76   Temp (!) 97.3 °F (36.3 °C)   Ht 5' 3" (1.6 m)   Wt 81.8 kg (180 lb 6.4 oz)   SpO2 98%   BMI 31.96 kg/m²     Physical Exam  Vitals and nursing note reviewed. Constitutional:       General: She is not in acute distress. Appearance: Normal appearance. She is well-developed. She is obese. She is not ill-appearing. HENT:      Head: Normocephalic and atraumatic. Right Ear: Tympanic membrane, ear canal and external ear normal.      Left Ear: Tympanic membrane, ear canal and external ear normal.      Nose: Nose normal.      Mouth/Throat:      Mouth: Mucous membranes are moist.      Pharynx: Oropharynx is clear. Eyes:      General:         Right eye: No discharge. Left eye: No discharge. Conjunctiva/sclera: Conjunctivae normal.      Pupils: Pupils are equal, round, and reactive to light. Cardiovascular:      Rate and Rhythm: Normal rate and regular rhythm. Pulses: Normal pulses. Heart sounds: Normal heart sounds. No murmur heard. Pulmonary:      Effort: Pulmonary effort is normal. No respiratory distress. Breath sounds: Normal breath sounds. Abdominal:      General: Abdomen is flat. Bowel sounds are normal.      Palpations: Abdomen is soft. Tenderness: There is no abdominal tenderness. Musculoskeletal:         General: No swelling. Normal range of motion. Cervical back: Normal range of motion and neck supple. Right lower leg: No edema. Left lower leg: No edema. Lymphadenopathy:      Cervical: No cervical adenopathy. Skin:     General: Skin is warm and dry. Capillary Refill: Capillary refill takes less than 2 seconds. Neurological:      General: No focal deficit present. Mental Status: She is alert and oriented to person, place, and time. Mental status is at baseline. Psychiatric:         Mood and Affect: Mood normal.         Behavior: Behavior normal.         Thought Content:  Thought content normal.          ALLY Hankins  [facility]

## 2023-10-26 NOTE — PATIENT INSTRUCTIONS
Wellness Visit for Adults   AMBULATORY CARE:   A wellness visit  is when you see your healthcare provider to get screened for health problems. Your healthcare provider will also give you advice on how to stay healthy. Write down your questions so you remember to ask them. Ask your healthcare provider how often you should have a wellness visit. What happens at a wellness visit:  Your healthcare provider will ask about your health, and your family history of health problems. This includes high blood pressure, heart disease, and cancer. He or she will ask if you have symptoms that concern you, if you smoke, and about your mood. You may also be asked about your intake of medicines, supplements, food, and alcohol. Any of the following may be done: Your weight  will be checked. Your height may also be checked so your body mass index (BMI) can be calculated. Your BMI shows if you are at a healthy weight. Your blood pressure  and heart rate will be checked. Your temperature may also be checked. Blood and urine tests  may be done. Blood tests may be done to check your cholesterol levels. Abnormal cholesterol levels increase your risk for heart disease and stroke. You may also need a blood or urine test to check for diabetes if you are at increased risk. Urine tests may be done to look for signs of an infection or kidney disease. A physical exam  includes checking your heartbeat and lungs with a stethoscope. Your healthcare provider may also check your skin to look for sun damage. Screening tests  may be recommended. A screening test is done to check for diseases that may not cause symptoms. The screening tests you may need depend on your age, gender, family history, and lifestyle habits. For example, colorectal screening may be recommended if you are 48years old or older. Screening tests you need if you are a woman:   A Pap smear  is used to screen for cervical cancer.  Pap smears are usually done every 3 to 5 years depending on your age. You may need them more often if you have had abnormal Pap smear test results in the past. Ask your healthcare provider how often you should have a Pap smear. A mammogram  is an x-ray of your breasts to screen for breast cancer. Experts recommend mammograms every 2 years starting at age 48 years. You may need a mammogram at age 52 years or younger if you have an increased risk for breast cancer. Talk to your healthcare provider about when you should start having mammograms and how often you need them. Vaccines you may need:   Get an influenza vaccine  every year. The influenza vaccine protects you from the flu. Several types of viruses cause the flu. The viruses change over time, so new vaccines are made each year. Get a tetanus-diphtheria (Td) booster vaccine  every 10 years. This vaccine protects you against tetanus and diphtheria. Tetanus is a severe infection that may cause painful muscle spasms and lockjaw. Diphtheria is a severe bacterial infection that causes a thick covering in the back of your mouth and throat. Get a human papillomavirus (HPV) vaccine  if you are female and aged 23 to 32 or male 23 to 24 and never received it. This vaccine protects you from HPV infection. HPV is the most common infection spread by sexual contact. HPV may also cause vaginal, penile, and anal cancers. Get a pneumococcal vaccine  if you are aged 72 years or older. The pneumococcal vaccine is an injection given to protect you from pneumococcal disease. Pneumococcal disease is an infection caused by pneumococcal bacteria. The infection may cause pneumonia, meningitis, or an ear infection. Get a shingles vaccine  if you are 60 or older, even if you have had shingles before. The shingles vaccine is an injection to protect you from the varicella-zoster virus. This is the same virus that causes chickenpox.  Shingles is a painful rash that develops in people who had chickenpox or have been exposed to the virus. How to eat healthy:  My Plate is a model for planning healthy meals. It shows the types and amounts of foods that should go on your plate. Fruits and vegetables make up about half of your plate, and grains and protein make up the other half. A serving of dairy is included on the side of your plate. The amount of calories and serving sizes you need depends on your age, gender, weight, and height. Examples of healthy foods are listed below:  Eat a variety of vegetables  such as dark green, red, and orange vegetables. You can also include canned vegetables low in sodium (salt) and frozen vegetables without added butter or sauces. Eat a variety of fresh fruits , canned fruit in 100% juice, frozen fruit, and dried fruit. Include whole grains. At least half of the grains you eat should be whole grains. Examples include whole-wheat bread, wheat pasta, brown rice, and whole-grain cereals such as oatmeal.    Eat a variety of protein foods such as seafood (fish and shellfish), lean meat, and poultry without skin (turkey and chicken). Examples of lean meats include pork leg, shoulder, or tenderloin, and beef round, sirloin, tenderloin, and extra lean ground beef. Other protein foods include eggs and egg substitutes, beans, peas, soy products, nuts, and seeds. Choose low-fat dairy products such as skim or 1% milk or low-fat yogurt, cheese, and cottage cheese. Limit unhealthy fats  such as butter, hard margarine, and shortening. Exercise:  Exercise at least 30 minutes per day on most days of the week. Some examples of exercise include walking, biking, dancing, and swimming. You can also fit in more physical activity by taking the stairs instead of the elevator or parking farther away from stores. Include muscle strengthening activities 2 days each week. Regular exercise provides many health benefits.  It helps you manage your weight, and decreases your risk for type 2 diabetes, heart disease, stroke, and high blood pressure. Exercise can also help improve your mood. Ask your healthcare provider about the best exercise plan for you. General health and safety guidelines:   Do not smoke. Nicotine and other chemicals in cigarettes and cigars can cause lung damage. Ask your healthcare provider for information if you currently smoke and need help to quit. E-cigarettes or smokeless tobacco still contain nicotine. Talk to your healthcare provider before you use these products. Limit alcohol. A drink of alcohol is 12 ounces of beer, 5 ounces of wine, or 1½ ounces of liquor. Lose weight, if needed. Being overweight increases your risk of certain health conditions. These include heart disease, high blood pressure, type 2 diabetes, and certain types of cancer. Protect your skin. Do not sunbathe or use tanning beds. Use sunscreen with a SPF 15 or higher. Apply sunscreen at least 15 minutes before you go outside. Reapply sunscreen every 2 hours. Wear protective clothing, hats, and sunglasses when you are outside. Drive safely. Always wear your seatbelt. Make sure everyone in your car wears a seatbelt. A seatbelt can save your life if you are in an accident. Do not use your cell phone when you are driving. This could distract you and cause an accident. Pull over if you need to make a call or send a text message. Practice safe sex. Use latex condoms if are sexually active and have more than one partner. Your healthcare provider may recommend screening tests for sexually transmitted infections (STIs). Wear helmets, lifejackets, and protective gear. Always wear a helmet when you ride a bike or motorcycle, go skiing, or play sports that could cause a head injury. Wear protective equipment when you play sports. Wear a lifejacket when you are on a boat or doing water sports.     © Copyright Merary Oliver 2023 Information is for End User's use only and may not be sold, redistributed or otherwise used for commercial purposes. The above information is an  only. It is not intended as medical advice for individual conditions or treatments. Talk to your doctor, nurse or pharmacist before following any medical regimen to see if it is safe and effective for you.

## 2023-11-02 ENCOUNTER — LAB (OUTPATIENT)
Dept: LAB | Facility: MEDICAL CENTER | Age: 63
End: 2023-11-02
Payer: COMMERCIAL

## 2023-11-02 DIAGNOSIS — Z13.29 THYROID DISORDER SCREEN: ICD-10-CM

## 2023-11-02 DIAGNOSIS — Z13.1 DIABETES MELLITUS SCREENING: ICD-10-CM

## 2023-11-02 DIAGNOSIS — E78.2 MIXED HYPERLIPIDEMIA: ICD-10-CM

## 2023-11-02 LAB
CHOLEST SERPL-MCNC: 204 MG/DL
EST. AVERAGE GLUCOSE BLD GHB EST-MCNC: 111 MG/DL
HBA1C MFR BLD: 5.5 %
HDLC SERPL-MCNC: 52 MG/DL
LDLC SERPL CALC-MCNC: 116 MG/DL (ref 0–100)
NONHDLC SERPL-MCNC: 152 MG/DL
TRIGL SERPL-MCNC: 178 MG/DL
TSH SERPL DL<=0.05 MIU/L-ACNC: 3.66 UIU/ML (ref 0.45–4.5)

## 2023-11-02 PROCEDURE — 36415 COLL VENOUS BLD VENIPUNCTURE: CPT

## 2023-11-02 PROCEDURE — 80061 LIPID PANEL: CPT

## 2023-11-02 PROCEDURE — 84443 ASSAY THYROID STIM HORMONE: CPT

## 2023-11-02 PROCEDURE — 83036 HEMOGLOBIN GLYCOSYLATED A1C: CPT

## 2023-11-15 ENCOUNTER — OFFICE VISIT (OUTPATIENT)
Dept: FAMILY MEDICINE CLINIC | Facility: MEDICAL CENTER | Age: 63
End: 2023-11-15
Payer: COMMERCIAL

## 2023-11-15 VITALS
SYSTOLIC BLOOD PRESSURE: 140 MMHG | OXYGEN SATURATION: 99 % | BODY MASS INDEX: 31.93 KG/M2 | WEIGHT: 180.2 LBS | DIASTOLIC BLOOD PRESSURE: 82 MMHG | HEIGHT: 63 IN | HEART RATE: 92 BPM | TEMPERATURE: 98.2 F

## 2023-11-15 DIAGNOSIS — L02.412 ABSCESS OF AXILLA, LEFT: Primary | ICD-10-CM

## 2023-11-15 PROCEDURE — 99213 OFFICE O/P EST LOW 20 MIN: CPT

## 2023-11-15 RX ORDER — SULFAMETHOXAZOLE AND TRIMETHOPRIM 800; 160 MG/1; MG/1
1 TABLET ORAL EVERY 12 HOURS SCHEDULED
Qty: 14 TABLET | Refills: 0 | Status: SHIPPED | OUTPATIENT
Start: 2023-11-15 | End: 2023-11-22

## 2023-11-15 NOTE — PROGRESS NOTES
Assessment/Plan:       1. Abscess of axilla, left  Bactrim course as below. Advised about warm compresses to the area 3-5 times daily. Return for recheck in 1 week, sooner if needed. Advised to watch for redness, drainage, fever. Advised to keep area clean and dry, avoid shaving area and avoid deodorant to area at this time. - sulfamethoxazole-trimethoprim (BACTRIM DS) 800-160 mg per tablet; Take 1 tablet by mouth every 12 (twelve) hours for 7 days  Dispense: 14 tablet; Refill: 0      Subjective:      Patient ID: Kathya Shetty is a 61 y.o. female. 61year old female presents with complaint of lump to left axilla x 3-4 days. She first started with pain to the left axilla last week, then lump developed. Area is painful. She denies fever, drainage, redness, decreased ROM. She tells me the size of the lump has decreased in size since she first noticed it. She has not tried any treatments such as warm compresses, etc as of yet. She just wanted to be sure it was checked due to her history of right breast ductal carcinoma and left breast concern which she had a mammogram, biopsy and follow up mammo for this past summer. She is scheduled for repeat imaging of this breast every 6 months. The following portions of the patient's history were reviewed and updated as appropriate: allergies, past family history, past medical history, past social history, past surgical history, and problem list.    Review of Systems   Constitutional: Negative. HENT: Negative. Eyes: Negative. Respiratory: Negative. Cardiovascular: Negative. Gastrointestinal: Negative. Endocrine: Negative. Genitourinary: Negative. Musculoskeletal: Negative. Skin:         Left axillary lump   Allergic/Immunologic: Negative. Neurological: Negative. Hematological: Negative. Psychiatric/Behavioral: Negative.            Objective:      /82 (BP Location: Left arm, Patient Position: Sitting, Cuff Size: Large)   Pulse 92   Temp 98.2 °F (36.8 °C) (Oral)   Ht 5' 3" (1.6 m)   Wt 81.7 kg (180 lb 3.2 oz)   SpO2 99%   BMI 31.92 kg/m²          Physical Exam  Vitals and nursing note reviewed. Constitutional:       General: She is not in acute distress. Appearance: Normal appearance. She is obese. She is not ill-appearing. HENT:      Head: Normocephalic and atraumatic. Eyes:      Conjunctiva/sclera: Conjunctivae normal.   Cardiovascular:      Rate and Rhythm: Normal rate. Pulses: Normal pulses. Pulmonary:      Effort: Pulmonary effort is normal.   Musculoskeletal:         General: Normal range of motion. Cervical back: Normal range of motion and neck supple. Skin:     Findings: Abscess (palpable mass noted to left axilla, approx. . size of a quarter. Moveable, soft. Tender to palpation. No evidence of erythema, drainage.) present. Neurological:      General: No focal deficit present. Mental Status: She is alert and oriented to person, place, and time. Mental status is at baseline. Psychiatric:         Mood and Affect: Mood normal.         Behavior: Behavior normal.         Thought Content:  Thought content normal.                    Froilan Dwyer

## 2023-11-15 NOTE — PATIENT INSTRUCTIONS
Warm compresses multiple times throughout the day. Bactrim twice daily x 7 days. Return in 1 week for recheck.

## 2023-12-01 ENCOUNTER — OFFICE VISIT (OUTPATIENT)
Dept: OBGYN CLINIC | Facility: CLINIC | Age: 63
End: 2023-12-01
Payer: COMMERCIAL

## 2023-12-01 VITALS
BODY MASS INDEX: 31.54 KG/M2 | SYSTOLIC BLOOD PRESSURE: 128 MMHG | WEIGHT: 178 LBS | DIASTOLIC BLOOD PRESSURE: 74 MMHG | HEIGHT: 63 IN

## 2023-12-01 DIAGNOSIS — Z01.419 WELL WOMAN EXAM WITH ROUTINE GYNECOLOGICAL EXAM: Primary | ICD-10-CM

## 2023-12-01 DIAGNOSIS — Z12.4 ENCOUNTER FOR SCREENING FOR MALIGNANT NEOPLASM OF CERVIX: ICD-10-CM

## 2023-12-01 DIAGNOSIS — Z11.51 SCREENING FOR HPV (HUMAN PAPILLOMAVIRUS): ICD-10-CM

## 2023-12-01 PROCEDURE — G0476 HPV COMBO ASSAY CA SCREEN: HCPCS | Performed by: OBSTETRICS & GYNECOLOGY

## 2023-12-01 PROCEDURE — S0610 ANNUAL GYNECOLOGICAL EXAMINA: HCPCS | Performed by: OBSTETRICS & GYNECOLOGY

## 2023-12-01 PROCEDURE — G0145 SCR C/V CYTO,THINLAYER,RESCR: HCPCS | Performed by: OBSTETRICS & GYNECOLOGY

## 2023-12-01 NOTE — PROGRESS NOTES
ASSESSMENT & PLAN:   Diagnoses and all orders for this visit:    Well woman exam with routine gynecological exam  -     Liquid-based pap, screening    Encounter for screening for malignant neoplasm of cervix  -     Liquid-based pap, screening    Screening for HPV (human papillomavirus)  -     Liquid-based pap, screening          The following were reviewed in today's visit: ASCCP guidelines, Gardisil vaccination, STD testing breast self exam, mammography screening reviewed, menopause, exercise, healthy diet, and colonoscopy reviewed. Patient to return to office in yearly for annual exam.     All questions have been answered to her satisfaction. CC:  Annual Gynecologic Examination  Chief Complaint   Patient presents with    Gynecologic Exam     Pt is here for her yearly exam. Pap due. Pap 11/27/17 nilm  Mammo managed by pcp  Colonoscopy 7/10/17 repeat 10yr           HPI: Latricia Melton is a 61 y.o. [id number] who presents for annual gynecologic examination. She has the following concerns:  none. She is following with Dr. Taylor Ramirez office for due to a recent abnormal mammogram and her history of DCIS. There was some confusion about whether a lumpectomy would be performed due to the abnormality that was seen on the biopsy. Further monitoring of the area and the lump will occur for the next 2 years every 6 months. She is scheduled for another mammogram and a follow-up appointment in Dr. Taylor Ramirez office right after [place]. History of mid urethral sling. She does still have some urinary leakage that is minimal when laughing extensively or sneezing or coughing. We did discuss the option for pelvic floor physical therapy which she declined. She and her  are no longer sexually active with penetrating intercourse due to health conditions for her  as well as her vaginal dryness.   It is not negatively impacting her quality of life and we discussed utilization of silicone lubricant, coconut oil, and other vaginal moisturizers that may assist if they would like to pursue that again. Health Maintenance:    Exercise: intermittently  Breast exams/breast awareness: yes  Last mammogram: 2023 -- follow up US with biopsy -- complex sclerosing lesion and biopsy done. Following with Dr. Bro Aj office. Colorectal cancer screenin - repeat in 10 years    Past Medical History:   Diagnosis Date    Abnormal Pap smear of cervix     Anxiety 2020    BRCA1 negative     Breast cancer ([address]) 2009    right breast    Cancer ([address])     Right breast cancer R lymph node bx neg. Last assessed: 13    Carpal tunnel syndrome     Chronic kidney disease     History of radiation therapy 2009    Kidney stone     Last assessed: 16    Major depressive disorder with single episode, in full remission ([address]) 2018    Obesity (BMI 30.0-34.9)     Osteoporosis     Sleep apnea     Patient can't use CPAP       Past Surgical History:   Procedure Laterality Date    BLADDER SUSPENSION      BREAST BIOPSY Right 2009    BREAST LUMPECTOMY Right 2009    malignant    CHOLECYSTECTOMY      COLONOSCOPY      EXAMINATION UNDER ANESTHESIA N/A 2023    Procedure: DRUG INDUCED SLEEP ENDOSCOPY;  Surgeon: Becky Dunlap MD;  [facility];  Service: ENT    HAND SURGERY      LITHOTRIPSY Left     MAMMO STEREOTACTIC BREAST BIOPSY LEFT (ALL INC) Left 2023    NY ARTHRP INTERPOS INTERCARPAL/METACARPAL JOINTS Left 10/23/2019    Procedure: THUMB SUSPENSIONPLASTY;  Surgeon: Parag Taylor MD;  [facility];  Service: Orthopedics    NY COLONOSCOPY FLX DX W/COLLJ SPEC WHEN PFRMD N/A 7/10/2017    Procedure: COLONOSCOPY;  Surgeon: Tito Tai MD;  [facility]: [facility];   Service: Gastroenterology    NY EXC B9 LESION MRGN XCP SK TG T/A/L 0.6-1.0 CM Left 10/23/2019    Procedure: UPPER EXTREMITY LESION/MASS EXCISION BIOPSY;  Surgeon: Parag Taylor MD;  [facility];  Service: Orthopedics    NY [address] SURG W/RLS TRANSVRS CARPL LIGM Left 10/3/2018    Procedure: ENDOSCOPIC CARPAL TUNNEL RELEASE;  Surgeon: Maylin Cornelius MD;  [facility];  Service: Orthopedics    SENTINEL LYMPH NODE BIOPSY      WRIST SURGERY         Past OB/Gyn History:   No LMP recorded. Patient is postmenopausal.    Menopausal status: postmenopausal  Menopausal symptoms: vaginal dryness    Last Pap: 2017 : no abnormalities  History of abnormal Pap smear: yes - prior to having kids. Hx of [facility]. Patient is not currently sexually active.    STD testing: no  Current contraception: post menopausal status      Family History  Family History   Problem Relation Age of Onset    Skin cancer Mother     Hypertension Mother     Heart disease Mother     Skin cancer Father     Hypertension Father     Prostate cancer Father 79        2 bouts    Asthma Father     Cancer Father     Heart attack Father     Heart disease Father     Diabetes Sister         Mellitus    Breast cancer Sister 40    Uterine cancer Sister 48    Skin cancer Sister     Colon polyps Sister         Sigmoid    Cancer Family     Thyroid disease Family         Disorder    Breast cancer Maternal Aunt 40        mets    Breast cancer Cousin     No Known Problems Daughter     No Known Problems Maternal Grandmother     No Known Problems Maternal Grandfather     No Known Problems Paternal Grandmother     No Known Problems Paternal Grandfather     No Known Problems Daughter     Heart attack Brother        Family history of uterine or ovarian cancer: no  Family history of breast cancer: yes  Family history of colon cancer: no    Social History:  Social History     Socioeconomic History    Marital status: /Civil Union     Spouse name: Not on file    Number of children: Not on file    Years of education: Not on file    Highest education level: Not on file   Occupational History    Not on file   Tobacco Use    Smoking status: Never    Smokeless tobacco: Never   Vaping Use    Vaping Use: Never used   Substance and Sexual Activity    Alcohol use: Yes     Comment: very light social    Drug use: No    Sexual activity: Yes     Partners: Male     Birth control/protection: None     Comment: tubal   Other Topics Concern    Not on file   Social History Narrative    Not on file     Social Determinants of Health     Financial Resource Strain: Not on file   Food Insecurity: Not on file   Transportation Needs: Not on file   Physical Activity: Not on file   Stress: Not on file   Social Connections: Not on file   Intimate Partner Violence: Not on file   Housing Stability: Not on file     Domestic violence screen: negative    Allergies:  No Known Allergies    Medications:  No current outpatient medications on file. Review of Systems:  Review of Systems   Constitutional:  Negative for activity change, appetite change and unexpected weight change. Respiratory:  Negative for cough and shortness of breath. Cardiovascular:  Negative for chest pain. Gastrointestinal:  Negative for abdominal pain, constipation, diarrhea, nausea and vomiting. Genitourinary:  Negative for difficulty urinating, dyspareunia, frequency, menstrual problem, pelvic pain, urgency, vaginal bleeding, vaginal discharge and vaginal pain. Musculoskeletal:  Negative for back pain. Skin: Negative. Neurological:  Negative for dizziness, weakness, light-headedness and headaches. Psychiatric/Behavioral: Negative. Physical Exam:  /74 (BP Location: Right arm, Patient Position: Sitting, Cuff Size: Large)   Ht 5' 3" (1.6 m)   Wt 80.7 kg (178 lb)   BMI 31.53 kg/m²    Physical Exam  Constitutional:       General: She is not in acute distress. Appearance: Normal appearance. She is well-developed. She is not diaphoretic. Genitourinary:      Vulva and bladder normal.      No lesions in the vagina. Genitourinary Comments: Perineum normal in appearance, no lacerations, no ulcerations, no lesions visualized. Right Labia: No rash, tenderness or lesions. Left Labia: No tenderness, lesions or rash. No inguinal adenopathy present in the right or left side. No vaginal discharge, erythema, tenderness or bleeding. No vaginal prolapse present. No vaginal atrophy present. Right Adnexa: not tender, not full and no mass present. Left Adnexa: not tender, not full and no mass present. Cervix is nulliparous. No cervical motion tenderness, discharge, friability, lesion or polyp. No parametrium nodularity or thickening present. Uterus is not enlarged, tender or prolapsed. No uterine mass detected. Uterus is midaxial.      No urethral prolapse or mass present. Bladder is not tender. Pelvic exam was performed with patient in the lithotomy position. Rectum:      No tenderness or external hemorrhoid. Breasts:     Breasts are symmetrical.      Right: No swelling, bleeding, mass, skin change or tenderness. Left: No swelling, bleeding, mass, skin change or tenderness. HENT:      Head: Normocephalic and atraumatic. Neck:      Thyroid: No thyromegaly or thyroid tenderness. Cardiovascular:      Rate and Rhythm: Normal rate and regular rhythm. Heart sounds: Normal heart sounds. No murmur heard. No friction rub. Pulmonary:      Effort: Pulmonary effort is normal. No respiratory distress. Breath sounds: Normal breath sounds. No wheezing or rales. Abdominal:      Palpations: Abdomen is soft. There is no mass. Tenderness: There is no abdominal tenderness. There is no guarding. Musculoskeletal:         General: No tenderness. Normal range of motion. Right lower leg: No edema. Left lower leg: No edema. Lymphadenopathy:      Lower Body: No right inguinal adenopathy. No left inguinal adenopathy. Neurological:      Mental Status: She is alert and oriented to person, place, and time. Skin:     General: Skin is warm and dry. Coloration: Skin is not pale. Findings: No erythema. Psychiatric:         Mood and Affect: Mood normal.         Behavior: Behavior normal.         Thought Content: Thought content normal.         Judgment: Judgment normal.   Vitals and nursing note reviewed.

## 2023-12-04 LAB
HPV HR 12 DNA CVX QL NAA+PROBE: NEGATIVE
HPV16 DNA CVX QL NAA+PROBE: NEGATIVE
HPV18 DNA CVX QL NAA+PROBE: NEGATIVE

## 2023-12-07 LAB
LAB AP GYN PRIMARY INTERPRETATION: NORMAL
Lab: NORMAL

## 2023-12-14 ENCOUNTER — HOSPITAL ENCOUNTER (OUTPATIENT)
Dept: ULTRASOUND IMAGING | Facility: CLINIC | Age: 63
Discharge: HOME | End: 2023-12-14
Payer: COMMERCIAL

## 2023-12-14 ENCOUNTER — HOSPITAL ENCOUNTER (OUTPATIENT)
Dept: MAMMOGRAPHY | Facility: CLINIC | Age: 63
Discharge: HOME | End: 2023-12-14
Payer: COMMERCIAL

## 2023-12-14 DIAGNOSIS — N63.0 MASS OF BREAST, UNSPECIFIED LATERALITY: ICD-10-CM

## 2023-12-14 PROCEDURE — 77066 DX MAMMO INCL CAD BI: CPT

## 2023-12-14 PROCEDURE — G0279 TOMOSYNTHESIS, MAMMO: HCPCS

## 2023-12-14 PROCEDURE — 76642 ULTRASOUND BREAST LIMITED: CPT

## 2023-12-18 ENCOUNTER — TELEPHONE (OUTPATIENT)
Dept: HEMATOLOGY ONCOLOGY | Facility: CLINIC | Age: 63
End: 2023-12-18

## 2023-12-18 NOTE — TELEPHONE ENCOUNTER
Confirmed with patient appt. On 12/27/23 with Yudi Foster. This was rescheduled from her cancellation via [employer].

## 2023-12-27 ENCOUNTER — OFFICE VISIT (OUTPATIENT)
Dept: SURGICAL ONCOLOGY | Facility: CLINIC | Age: 63
End: 2023-12-27
Payer: COMMERCIAL

## 2023-12-27 VITALS
RESPIRATION RATE: 15 BRPM | HEART RATE: 100 BPM | DIASTOLIC BLOOD PRESSURE: 80 MMHG | WEIGHT: 180 LBS | OXYGEN SATURATION: 99 % | SYSTOLIC BLOOD PRESSURE: 130 MMHG | HEIGHT: 63 IN | TEMPERATURE: 97.8 F | BODY MASS INDEX: 31.89 KG/M2

## 2023-12-27 DIAGNOSIS — Z86.000 HISTORY OF DUCTAL CARCINOMA IN SITU (DCIS) OF BREAST: Primary | ICD-10-CM

## 2023-12-27 DIAGNOSIS — R92.8 ABNORMAL FINDING ON BREAST IMAGING: ICD-10-CM

## 2023-12-27 PROCEDURE — 99213 OFFICE O/P EST LOW 20 MIN: CPT

## 2023-12-27 NOTE — PROGRESS NOTES
Surgical Oncology Follow Up       [facility] [place]  [address]  [place] [address]    Lloyd Camejo  1960  [id number]  [facility]  [address]  [place] [address]    Chief Complaint   Patient presents with    Follow-up       Assessment/Plan:  1. History of ductal carcinoma in situ (DCIS) of breast    2. Abnormal finding on breast imaging  - 1 year follow up  - q6 mo diagnostic mammogram    Discussion/Summary: Patient is a 63-year-old female presenting today for abnormal breast imaging. She has a history of right breast cancer diagnosed in 2009. She underwent an excisional biopsy which revealed DCIS with microinvasion and ADH. She underwent a right lumpectomy with sentinel lymph node biopsy with Dr. Reese on 04/16/2009 which revealed no residual carcinoma in the specimen.  She did not have lymphovascular invasion. She underwent whole breast radiation therapy and completed 5 years of tamoxifen. Patient had genetic testing which was negative.  She had a bilateral diagnostic mammogram and left breast ultrasound on 12/14/2023 which was BI-RADS 3 category 2 density.  Patient does have history of left breast biopsy, pathology revealing a possible radial scar seen on pathology, continued surveillance imaging is recommended.  She has a diagnostic mammogram of the left breast scheduled in June of next year. I will plan to see her back in 1 year. She was instructed to call with any questions or concerns prior to this time. All questions were answered today.        History of Present Illness:          -Interval History: Patient is a 63-year-old female presenting today for abnormal breast imaging. She has a history of right breast cancer diagnosed in 2009. She had a bilateral diagnostic mammogram and left breast ultrasound on 12/14/2023 which was BI-RADS 3 category 2 density.  Patient does have history of left breast biopsy, pathology revealing a possible radial scar seen on pathology, continued surveillance imaging is recommended.  Patient denies changes on her breast exam.        Review of Systems:  Review of Systems   Constitutional:  Negative for activity change, appetite change, fatigue and unexpected weight change.   Respiratory:  Negative for cough and shortness of breath.    Cardiovascular:  Negative for chest pain.   Gastrointestinal:  Negative for abdominal pain, diarrhea, nausea and vomiting.   Endocrine: Negative for heat intolerance.   Musculoskeletal:  Negative for arthralgias, back pain and myalgias.   Skin:  Negative for rash.   Neurological:  Negative for weakness and headaches.   Hematological:  Negative for adenopathy.       Patient Active Problem List   Diagnosis    Prinzmetal's angina (HCC)    Environmental and seasonal allergies    Hyperlipidemia    Obstructive sleep apnea    Vitamin D deficiency    Osteopenia    History of ductal carcinoma in situ (DCIS) of breast    BMI 31.0-31.9,adult     Past Medical History:   Diagnosis Date    Abnormal Pap smear of cervix     Anxiety 5/12/2020    BRCA1 negative     Breast cancer (HCC) 02/06/2009    right breast    Cancer (HCC)     Right breast cancer R lymph node bx neg. Last assessed: 6/4/13    Carpal tunnel syndrome     Chronic kidney disease     History of radiation therapy 03/2009    Kidney stone     Last assessed: 1/7/16    Major depressive disorder with single episode, in full remission (HCC) 9/5/2018    Obesity (BMI 30.0-34.9)     Osteoporosis     Sleep apnea     Patient can't use CPAP     Past Surgical History:   Procedure Laterality Date    BLADDER SUSPENSION      BREAST BIOPSY Right 2009    BREAST LUMPECTOMY Right 02/06/2009    malignant    CHOLECYSTECTOMY      COLONOSCOPY      EXAMINATION UNDER ANESTHESIA N/A 8/28/2023    Procedure: DRUG INDUCED SLEEP ENDOSCOPY;  Surgeon: Rashawn Hutchins MD;  Location: [facility]  OR;  Service: ENT    HAND SURGERY      LITHOTRIPSY Left     MAMMO STEREOTACTIC BREAST BIOPSY LEFT (ALL INC) Left 7/12/2023    NJ ARTHRP INTERPOS INTERCARPAL/METACARPAL JOINTS Left 10/23/2019    Procedure: THUMB SUSPENSIONPLASTY;  Surgeon: Festus Brady MD;  [facility];  Service: Orthopedics    NJ COLONOSCOPY FLX DX W/COLLJ SPEC WHEN PFRMD N/A 7/10/2017    Procedure: COLONOSCOPY;  Surgeon: Jamel Torres MD;  Location: [facility] LAB;  Service: Gastroenterology    NJ EXC B9 LESION MRGN XCP SK TG T/A/L 0.6-1.0 CM Left 10/23/2019    Procedure: UPPER EXTREMITY LESION/MASS EXCISION BIOPSY;  Surgeon: Festus Brady MD;  [facility];  Service: Orthopedics    NJ NDSC WRST SURG W/RLS TRANSVRS CARPL LIGM Left 10/3/2018    Procedure: ENDOSCOPIC CARPAL TUNNEL RELEASE;  Surgeon: Festus Brady MD;  [facility];  Service: Orthopedics    SENTINEL LYMPH NODE BIOPSY      WRIST SURGERY       Family History   Problem Relation Age of Onset    Skin cancer Mother     Hypertension Mother     Heart disease Mother     Skin cancer Father     Hypertension Father     Prostate cancer Father 70        2 bouts    Asthma Father     Cancer Father     Heart attack Father     Heart disease Father     Diabetes Sister         Mellitus    Breast cancer Sister 44    Uterine cancer Sister 50    Skin cancer Sister     Colon polyps Sister         Sigmoid    Cancer Family     Thyroid disease Family         Disorder    Breast cancer Maternal Aunt 40        mets    Breast cancer Cousin     No Known Problems Daughter     No Known Problems Maternal Grandmother     No Known Problems Maternal Grandfather     No Known Problems Paternal Grandmother     No Known Problems Paternal Grandfather     No Known Problems Daughter     Heart attack Brother      Social History     Socioeconomic History    Marital status: /Civil Union     Spouse name: Not on file    Number of children: Not on file    Years of education: Not on file    Highest  education level: Not on file   Occupational History    Not on file   Tobacco Use    Smoking status: Never    Smokeless tobacco: Never   Vaping Use    Vaping status: Never Used   Substance and Sexual Activity    Alcohol use: Yes     Comment: very light social    Drug use: No    Sexual activity: Yes     Partners: Male     Birth control/protection: None     Comment: tubal   Other Topics Concern    Not on file   Social History Narrative    Not on file     Social Determinants of Health     Financial Resource Strain: Not on file   Food Insecurity: Not on file   Transportation Needs: Not on file   Physical Activity: Not on file   Stress: Not on file   Social Connections: Not on file   Intimate Partner Violence: Not on file   Housing Stability: Not on file     No current outpatient medications on file.  No Known Allergies  Vitals:    12/27/23 1324   BP: 130/80   Pulse: 100   Resp: 15   Temp: 97.8 °F (36.6 °C)   SpO2: 99%       Physical Exam  Constitutional:       General: She is not in acute distress.     Appearance: Normal appearance.   Cardiovascular:      Rate and Rhythm: Normal rate and regular rhythm.      Pulses: Normal pulses.      Heart sounds: Normal heart sounds.   Pulmonary:      Effort: Pulmonary effort is normal.      Breath sounds: Normal breath sounds.   Chest:      Chest wall: No mass.   Breasts:     Right: No swelling, bleeding, inverted nipple, mass, nipple discharge, skin change or tenderness.      Left: No swelling, bleeding, inverted nipple, mass, nipple discharge, skin change or tenderness.   Abdominal:      General: Abdomen is flat.      Palpations: Abdomen is soft.   Lymphadenopathy:      Upper Body:      Right upper body: No supraclavicular, axillary or pectoral adenopathy.      Left upper body: No supraclavicular, axillary or pectoral adenopathy.   Skin:     General: Skin is warm.   Neurological:      General: No focal deficit present.      Mental Status: She is alert and oriented to person, place,  and time.   Psychiatric:         Mood and Affect: Mood normal.         Behavior: Behavior normal.           Results:    Imaging  Mammo diagnostic bilateral w 3d & cad, US breast left limited (diagnostic)    Result Date: 12/14/2023  Narrative: DIAGNOSIS: Mass of breast, unspecified laterality TECHNIQUE: Digital diagnostic mammography was performed. Computer Aided Detection (CAD) analyzed all applicable images. Left breast ultrasound was performed. COMPARISONS: Prior breast imaging dated: 07/12/2023, 07/12/2023, 06/13/2023, 06/13/2023, 12/13/2022, 12/13/2022, 08/31/2021, 05/13/2020, 12/20/2018, 12/18/2017, 12/12/2016, 06/08/2016, 12/09/2015, 05/14/2014, and 05/13/2013 RELEVANT HISTORY: Family Breast Cancer History: History of breast cancer in Sister, Maternal Aunt, Cousin. Family Medical History: Family medical history includes breast cancer in 3 relatives (cousin, maternal aunt, sister). Personal History: Hormone history includes birth control and tamoxifen. Surgical history includes breast biopsy and lumpectomy. Medical history includes breast cancer and BRCA 1 negative. RISK ASSESSMENT: Tyrer-Cuzick risk assessment reporting was suppressed due to the patient's history and/or demographic factors. TISSUE DENSITY: There are scattered areas of fibroglandular density. INDICATION: Lloyd Camejo is a 63 y.o. female presenting for annual, follow up abnormal imaging.  Personal history of right breast cancer.  Patient had a left breast biopsy with benign pathology and a possible radial scar.  Surveillance imaging has been recommended over excision.   She also has focal swelling in the left breast which was thought to be an abscess.  She has been given antibiotics and reports her symptoms have resolved.  The area was about the size of a pea and was palpable. FINDINGS: LEFT 1) biopsy site [B] Mammo diagnostic bilateral w 3d & cad: There are postbiopsy changes in the upper outer quadrant, middle depth.  The previously  demonstrated oval mass which was the target for the biopsy is no longer seen.  On the prebiopsy mammogram from 6/13/2023 there was some possible adjacent architectural distortion.  Based on the technique for the biopsy (CC compression, lateral approach with the horizontal arm), this was likely sampled at the time of the biopsy and may explain the radial scar seen in the pathology.  This finding appears decreased compared to the prebiopsy mammogram.  Given the finding of a possible radial scar on pathology, continued surveillance imaging is recommended with mammography. US breast left limited (diagnostic): On the previous ultrasound there was a cyst at 12:00, 5 cm the nipple which was described as a possible correlate for the mammographic mass.  This area was evaluated with targeted ultrasound and the finding is no longer seen.  No additional dedicated ultrasound follow-up is indicated. Area of clinical concern in the left axilla: A marker was placed on the skin prior to the mammogram.  There is no underlying mammographic abnormality.  This is visible on the MLO and CC XL views. Targeted ultrasound was performed to evaluate the area of concern in the axilla.  Some morphologically benign lymph nodes were seen.  There is no suspicious solid mass or collection in the area of concern in the axilla. Right Mammo diagnostic bilateral w 3d & cad There are stable postsurgical changes in the superior central breast and axilla.  There are no suspicious masses, grouped microcalcifications or areas of unexplained architectural distortion. The skin and nipple areolar complex are unremarkable.     Impression: 1.  Left breast demonstrates expected postbiopsy change in the upper outer quadrant.  Given the possible radial scar seen on pathology, continued surveillance imaging is recommended with a mammogram in 6 months.  The previously described left breast cyst has resolved and no dedicated ultrasound follow-up is indicated.  The  patient had an area of clinical concern in the left axilla which was treated with antibiotics and resolved.  There is no abnormality on targeted ultrasound in this area of concern today. 2.  Right breast: Findings are benign.  No mammographic evidence of malignancy. Correlation with physical examination is recommended.  If there is a clinically suspicious palpable mass then biopsy would be indicated.  Otherwise, clinical management of the breast symptoms is recommended. ASSESSMENT/BI-RADS CATEGORY: Left: 3 - Probably Benign Right: 2 - Benign Overall: 3 - Probably Benign RECOMMENDATION:      - Diagnostic mammogram in 6 months for the left breast.      - Clinical management for the left breast.      - Diagnostic mammogram in 1 year for the right breast. Workstation ID: [id number]       I reviewed the above imaging data.      Advance Care Planning/Advance Directives:  Discussed disease status, cancer treatment plans and/or cancer treatment goals with the patient.

## 2024-05-30 ENCOUNTER — OFFICE VISIT (OUTPATIENT)
Dept: URGENT CARE | Facility: MEDICAL CENTER | Age: 64
End: 2024-05-30
Payer: COMMERCIAL

## 2024-05-30 ENCOUNTER — TELEPHONE (OUTPATIENT)
Age: 64
End: 2024-05-30

## 2024-05-30 VITALS
SYSTOLIC BLOOD PRESSURE: 115 MMHG | WEIGHT: 180 LBS | HEIGHT: 63 IN | RESPIRATION RATE: 18 BRPM | DIASTOLIC BLOOD PRESSURE: 63 MMHG | OXYGEN SATURATION: 96 % | BODY MASS INDEX: 31.89 KG/M2 | TEMPERATURE: 98.7 F | HEART RATE: 101 BPM

## 2024-05-30 DIAGNOSIS — H93.13 TINNITUS OF BOTH EARS: ICD-10-CM

## 2024-05-30 DIAGNOSIS — U07.1 COVID-19: Primary | ICD-10-CM

## 2024-05-30 DIAGNOSIS — R05.1 ACUTE COUGH: ICD-10-CM

## 2024-05-30 DIAGNOSIS — R09.81 NASAL CONGESTION: ICD-10-CM

## 2024-05-30 PROCEDURE — 99213 OFFICE O/P EST LOW 20 MIN: CPT | Performed by: PHYSICIAN ASSISTANT

## 2024-05-30 NOTE — PROGRESS NOTES
"  [facility]        NAME: Lloyd Camejo is a 63 y.o. female  : 1960    MRN: [id number]  DATE: May 30, 2024  TIME: 10:59 AM    Assessment and Plan   No primary diagnosis found.  No diagnosis found.      Patient Instructions     Recommend she and her sister both wear a mask while visiting together.  No indication for Paxlovid. Sister should reach out to her PCP if symptomatic.  Stay well hydrated and get rest.  Follow up with her PCP in 3-5 days.  ER if symptoms worsen.    Chief Complaint     Chief Complaint   Patient presents with    COVID-19     Was on cruise and flew home Monday.  Started Tuesday with cough, yesterday started with chest congestion, nasal congestion, runny nose, fever No fever.  Has family staying with them and is concerned for her sister that has half a lung.  Has been taking dayquil and nyquil.  Tested today and covid positive.         History of Present Illness       Patient is a 62 yo female who presents with acute onset of cough/congestion and sinus pressure beginning 2 days ago after she returned home from a cruise to [place]. Patient took an at-home COVID test this morning, which was positive. She has family that dog sitting while she was away. One of the family is her sister who has \"half a lung.\" Patient is concerned about transmitting COVID to her sister. Denies fever. Denies SOB/chest pain.        Review of Systems   Review of Systems   Constitutional: Negative.    HENT:  Positive for congestion, sinus pressure and tinnitus.    Respiratory:  Positive for cough. Negative for chest tightness, shortness of breath and wheezing.    Cardiovascular: Negative.    Skin: Negative.          Current Medications     No current outpatient medications on file.    Current Allergies     Allergies as of 2024    (No Known Allergies)            The following portions of the patient's history were reviewed and updated as appropriate: allergies, current medications, past family history, " past medical history, past social history, past surgical history and problem list.     Past Medical History:   Diagnosis Date    Abnormal Pap smear of cervix     Anxiety 5/12/2020    BRCA1 negative     Breast cancer (HCC) 02/06/2009    right breast    Cancer (HCC)     Right breast cancer R lymph node bx neg. Last assessed: 6/4/13    Carpal tunnel syndrome     Chronic kidney disease     History of radiation therapy 03/2009    Kidney stone     Last assessed: 1/7/16    Major depressive disorder with single episode, in full remission (HCC) 9/5/2018    Obesity (BMI 30.0-34.9)     Osteoporosis     Sleep apnea     Patient can't use CPAP       Past Surgical History:   Procedure Laterality Date    BLADDER SUSPENSION      BREAST BIOPSY Right 2009    BREAST LUMPECTOMY Right 02/06/2009    malignant    CHOLECYSTECTOMY      COLONOSCOPY      EXAMINATION UNDER ANESTHESIA N/A 8/28/2023    Procedure: DRUG INDUCED SLEEP ENDOSCOPY;  Surgeon: Rashawn Hutchins MD;  [facility];  Service: ENT    HAND SURGERY      LITHOTRIPSY Left     MAMMO STEREOTACTIC BREAST BIOPSY LEFT (ALL INC) Left 7/12/2023    SD ARTHRP INTERPOS INTERCARPAL/METACARPAL JOINTS Left 10/23/2019    Procedure: THUMB SUSPENSIONPLASTY;  Surgeon: Festus Brady MD;  [facility];  Service: Orthopedics    SD COLONOSCOPY FLX DX W/COLLJ SPEC WHEN PFRMD N/A 7/10/2017    Procedure: COLONOSCOPY;  Surgeon: Jamel Torres MD;  Location: [facility];  Service: Gastroenterology    SD EXC B9 LESION MRGN XCP SK TG T/A/L 0.6-1.0 CM Left 10/23/2019    Procedure: UPPER EXTREMITY LESION/MASS EXCISION BIOPSY;  Surgeon: Festus Brady MD;  [facility];  Service: Orthopedics    SD NDSC WRST SURG W/RLS TRANSVRS CARPL LIGM Left 10/3/2018    Procedure: ENDOSCOPIC CARPAL TUNNEL RELEASE;  Surgeon: Festus Brady MD;  [facility];  Service: Orthopedics    SENTINEL LYMPH NODE BIOPSY      WRIST SURGERY         Family History   Problem Relation Age of Onset    Skin  "cancer Mother     Hypertension Mother     Heart disease Mother     Skin cancer Father     Hypertension Father     Prostate cancer Father 70        2 bouts    Asthma Father     Cancer Father     Heart attack Father     Heart disease Father     Diabetes Sister         Mellitus    Breast cancer Sister 44    Uterine cancer Sister 50    Skin cancer Sister     Colon polyps Sister         Sigmoid    Cancer Family     Thyroid disease Family         Disorder    Breast cancer Maternal Aunt 40        mets    Breast cancer Cousin     No Known Problems Daughter     No Known Problems Maternal Grandmother     No Known Problems Maternal Grandfather     No Known Problems Paternal Grandmother     No Known Problems Paternal Grandfather     No Known Problems Daughter     Heart attack Brother          Medications have been verified.        Objective   /63   Pulse 101   Temp 98.7 °F (37.1 °C) (Temporal)   Resp 18   Ht 5' 3\" (1.6 m)   Wt 81.6 kg (180 lb)   SpO2 96%   BMI 31.89 kg/m²        Physical Exam     Physical Exam  Vitals reviewed.   Constitutional:       General: She is not in acute distress.     Appearance: Normal appearance.   HENT:      Head: Normocephalic and atraumatic.      Right Ear: Tympanic membrane, ear canal and external ear normal.      Left Ear: Tympanic membrane, ear canal and external ear normal.      Nose: Congestion present.      Mouth/Throat:      Mouth: Mucous membranes are moist.      Pharynx: Oropharynx is clear.   Cardiovascular:      Rate and Rhythm: Normal rate and regular rhythm.      Pulses: Normal pulses.      Heart sounds: Normal heart sounds.   Pulmonary:      Effort: Pulmonary effort is normal. No respiratory distress.      Breath sounds: Normal breath sounds. No wheezing, rhonchi or rales.      Comments: + cough  Musculoskeletal:      Cervical back: Normal range of motion and neck supple.   Lymphadenopathy:      Cervical: No cervical adenopathy.   Skin:     General: Skin is warm and dry. "      Capillary Refill: Capillary refill takes less than 2 seconds.      Findings: No rash.   Neurological:      Mental Status: She is alert.

## 2024-05-30 NOTE — TELEPHONE ENCOUNTER
Patient called and stated she just returned from vacation on Monday and began to have symptoms of cough, congestion, runny nose, and slight headache Monday night into Tuesday morning.  Yesterday she developed a very thick greenish mucus that she stated was almost stuck to the roof of her mouth.  Spoke with the practice to see if patient could be accommodated as there were no available appointments.  Offered to schedule patient with the virtual practice and also suggested urgent care.  Patient chose to go to urgent care to be tested again as she was not sure if the test she used was .

## 2024-06-06 ENCOUNTER — HOSPITAL ENCOUNTER (OUTPATIENT)
Dept: RADIOLOGY | Facility: HOSPITAL | Age: 64
Discharge: HOME | End: 2024-06-06
Payer: COMMERCIAL

## 2024-06-06 DIAGNOSIS — Z46.9 DEVICE FITTING OR ADJUSTMENT: ICD-10-CM

## 2024-06-06 PROCEDURE — 71046 X-RAY EXAM CHEST 2 VIEWS: CPT

## 2024-06-06 PROCEDURE — 70360 X-RAY EXAM OF NECK: CPT

## 2024-06-17 PROBLEM — Z45.42 ENCOUNTER FOR ADJUSTMENT AND MANAGEMENT OF NEUROSTIMULATOR: Status: ACTIVE | Noted: 2024-06-17

## 2024-06-18 ENCOUNTER — HOSPITAL ENCOUNTER (OUTPATIENT)
Dept: MAMMOGRAPHY | Facility: CLINIC | Age: 64
Discharge: HOME | End: 2024-06-18
Payer: COMMERCIAL

## 2024-06-18 VITALS — BODY MASS INDEX: 31.89 KG/M2 | WEIGHT: 180 LBS | HEIGHT: 63 IN

## 2024-06-18 DIAGNOSIS — R92.8 ABNORMAL FINDINGS ON DIAGNOSTIC IMAGING OF BREAST: ICD-10-CM

## 2024-06-18 PROCEDURE — 77065 DX MAMMO INCL CAD UNI: CPT

## 2024-06-18 PROCEDURE — G0279 TOMOSYNTHESIS, MAMMO: HCPCS

## 2024-07-09 ENCOUNTER — OFFICE VISIT (OUTPATIENT)
Dept: FAMILY MEDICINE CLINIC | Facility: MEDICAL CENTER | Age: 64
End: 2024-07-09
Payer: COMMERCIAL

## 2024-07-09 VITALS
WEIGHT: 184.4 LBS | HEART RATE: 80 BPM | BODY MASS INDEX: 32.66 KG/M2 | TEMPERATURE: 99 F | DIASTOLIC BLOOD PRESSURE: 88 MMHG | OXYGEN SATURATION: 96 % | SYSTOLIC BLOOD PRESSURE: 132 MMHG | RESPIRATION RATE: 14 BRPM

## 2024-07-09 DIAGNOSIS — R63.5 WEIGHT GAIN: Primary | ICD-10-CM

## 2024-07-09 DIAGNOSIS — L40.9 SCALP PSORIASIS: ICD-10-CM

## 2024-07-09 PROCEDURE — 99214 OFFICE O/P EST MOD 30 MIN: CPT | Performed by: STUDENT IN AN ORGANIZED HEALTH CARE EDUCATION/TRAINING PROGRAM

## 2024-07-09 RX ORDER — CLOBETASOL PROPIONATE 0.5 MG/ML
SOLUTION TOPICAL 2 TIMES DAILY
Qty: 25 ML | Refills: 0 | Status: SHIPPED | OUTPATIENT
Start: 2024-07-09 | End: 2024-07-16

## 2024-07-09 NOTE — PROGRESS NOTES
"  Formerly [facility] - Clinic Note  Nico Aguayo DO, 24     Lloyd Camejo MRN: [id number] : 1960 Age: 63 y.o.     Assessment/Plan     1. Weight gain    -2023 thyroid function testing normal  -Advised about hormone balancing supplement  -Encouraged to track caloric intake, goal for caloric deficit  -Encouraged 150 minutes moderate intensity exercise per week  -Encouraged increase of protein intake and fiber  -Patient defers referral to weight management center at this time  - Ambulatory Referral to Endocrinology; Future    2. BMI 32.0-32.9,adult    - Ambulatory Referral to Endocrinology; Future    3. Scalp psoriasis    - clobetasol (TEMOVATE) 0.05 % external solution; Apply topically 2 (two) times a day for 7 days Use 1-2 times a day for 1 week  Dispense: 25 mL; Refill: 0      Lloyd Camejo acknowledged understanding of treatment plan, all questions answered.  Return for annual physical with PCP in 2024 and sooner as needed.    Subjective      Lloyd Camejo is a 63 y.o. female who presents to discuss difficulty losing weight.  She is a patient of [facility] Regal[facility].  Patient states she saw weight management center in the past and lost 13 pounds then \" flatlined for 4+ years\".  Patient states she walks 1-1/2 to 2 miles per day and dances.  Patient states she no longer tracks caloric intake.  Patient endorses fatigue, had Inspire placed.  Patient states a few months ago her \" nighttime snacking was off the chart, cut out popcorn since January\".  Patient also complains of psoriasis plaque on neck that is bothersome.    The following portions of the patient's history were reviewed and updated as appropriate: allergies, current medications, past family history, past medical history, past social history, past surgical history and problem list.     Past Medical History:   Diagnosis Date    Abnormal Pap smear of cervix     Anxiety 2020    BRCA1 negative     Breast cancer (HCC) " 02/06/2009    right breast    Cancer (HCC)     Right breast cancer R lymph node bx neg. Last assessed: 6/4/13    Carpal tunnel syndrome     Chronic kidney disease     History of radiation therapy 03/2009    Kidney stone     Last assessed: 1/7/16    Major depressive disorder with single episode, in full remission (HCC) 9/5/2018    Obesity (BMI 30.0-34.9)     Osteoporosis     Sleep apnea     Patient can't use CPAP       No Known Allergies    Past Surgical History:   Procedure Laterality Date    BLADDER SUSPENSION      BREAST BIOPSY Right 2009    BREAST LUMPECTOMY Right 02/06/2009    malignant    CHOLECYSTECTOMY      COLONOSCOPY      EXAMINATION UNDER ANESTHESIA N/A 8/28/2023    Procedure: DRUG INDUCED SLEEP ENDOSCOPY;  Surgeon: Rashawn Hutchins MD;  [facility];  Service: ENT    HAND SURGERY      LITHOTRIPSY Left     MAMMO STEREOTACTIC BREAST BIOPSY LEFT (ALL INC) Left 7/12/2023    SD ARTHRP INTERPOS INTERCARPAL/METACARPAL JOINTS Left 10/23/2019    Procedure: THUMB SUSPENSIONPLASTY;  Surgeon: Festus Brady MD;  Location: [facility];  Service: Orthopedics    SD COLONOSCOPY FLX DX W/COLLJ SPEC WHEN PFRMD N/A 7/10/2017    Procedure: COLONOSCOPY;  Surgeon: Jamel Torres MD;  Location: [facility];  Service: Gastroenterology    SD EXC B9 LESION MRGN XCP SK TG T/A/L 0.6-1.0 CM Left 10/23/2019    Procedure: UPPER EXTREMITY LESION/MASS EXCISION BIOPSY;  Surgeon: Festus Brady MD;  [facility];  Service: Orthopedics    SD NDSC WRST SURG W/RLS TRANSVRS CARPL LIGM Left 10/3/2018    Procedure: ENDOSCOPIC CARPAL TUNNEL RELEASE;  Surgeon: Festus Brady MD;  [facility];  Service: Orthopedics    SENTINEL LYMPH NODE BIOPSY      WRIST SURGERY         Family History   Problem Relation Age of Onset    Skin cancer Mother     Hypertension Mother     Heart disease Mother     Skin cancer Father     Hypertension Father     Prostate cancer Father 70        2 bouts    Asthma Father     Cancer Father     Heart  attack Father     Heart disease Father     Diabetes Sister         Mellitus    Breast cancer Sister 44    Uterine cancer Sister 50    Skin cancer Sister     Colon polyps Sister         Sigmoid    Cancer Family     Thyroid disease Family         Disorder    Breast cancer Maternal Aunt 40        mets    Breast cancer Cousin     No Known Problems Daughter     No Known Problems Maternal Grandmother     No Known Problems Maternal Grandfather     No Known Problems Paternal Grandmother     No Known Problems Paternal Grandfather     No Known Problems Daughter     Heart attack Brother        Social History     Socioeconomic History    Marital status: /Civil Union     Spouse name: None    Number of children: None    Years of education: None    Highest education level: None   Occupational History    None   Tobacco Use    Smoking status: Never    Smokeless tobacco: Never   Vaping Use    Vaping status: Never Used   Substance and Sexual Activity    Alcohol use: Yes     Comment: very light social    Drug use: No    Sexual activity: Yes     Partners: Male     Birth control/protection: None     Comment: tubal   Other Topics Concern    None   Social History Narrative    None     Social Determinants of Health     Financial Resource Strain: Not on file   Food Insecurity: Not on file   Transportation Needs: Not on file   Physical Activity: Not on file   Stress: Not on file   Social Connections: Not on file   Intimate Partner Violence: Not on file   Housing Stability: Not on file       No current outpatient medications on file.     No current facility-administered medications for this visit.       Review of Systems     As noted in HPI    Objective      /88 (BP Location: Left arm, Patient Position: Sitting, Cuff Size: Standard)   Pulse 80   Temp 99 °F (37.2 °C) (Temporal)   Resp 14   Wt 83.6 kg (184 lb 6.4 oz)   SpO2 96%   BMI 32.66 kg/m²     Physical Exam  Vitals reviewed.   Constitutional:       General: She is not in  "acute distress.     Appearance: She is obese.   HENT:      Head: Normocephalic and atraumatic.   Eyes:      Conjunctiva/sclera: Conjunctivae normal.   Pulmonary:      Effort: Pulmonary effort is normal.   Skin:     Findings: Rash present.      Comments: Pink scaly patch occipital region   Neurological:      Mental Status: She is alert and oriented to person, place, and time.   Psychiatric:         Mood and Affect: Mood normal.             Some portions of this record may have been generated with voice recognition software. There may be translation, syntax, or grammatical errors. Occasional wrong word or \"sound-a-like\" substitutions may have occurred due to the inherent limitations of the voice recognition software. Read the chart carefully and recognize, using context, where substations may have occurred. If you have any questions, please contact the dictating provider for clarification or correction, as needed.  "

## 2024-07-15 ENCOUNTER — TELEPHONE (OUTPATIENT)
Age: 64
End: 2024-07-15

## 2024-07-15 NOTE — TELEPHONE ENCOUNTER
Patient called to schedule new patient appointment with Endocrinology. Reviewed referral and patient was referred for BMI 32.0-32.9,adult and weight gain . Reviewed last office note and it seems patient deferred weight management referral so she was referred to Endocrinology. Reviewed labs and all look okay.     Reached out to my manager, Zoila, to see if this is appropriate to schedule.     Zoila reviewed chart and reached out to provider and will be letting me know if this is appropriate to schedule.     Advised patient I would call her back.

## 2024-07-15 NOTE — TELEPHONE ENCOUNTER
Zoila got back to me and said referral is not appropriate for Endocrinology.     Called and spoke to patient and discussed with her. She states that her PCP told her that she referred her stating she believes there is some kind of hormonal imbalance.     Advised patient if that is the case then PCP needs to place proper labs and work up in regards to that and we need results so we can review and Endocrinology has a dx or at least lab results to go off of.     Patient verbalized understanding and states she will reach out to PCP to get labs done.

## 2024-10-29 ENCOUNTER — APPOINTMENT (OUTPATIENT)
Dept: LAB | Facility: MEDICAL CENTER | Age: 64
End: 2024-10-29
Payer: COMMERCIAL

## 2024-10-29 ENCOUNTER — OFFICE VISIT (OUTPATIENT)
Dept: FAMILY MEDICINE CLINIC | Facility: MEDICAL CENTER | Age: 64
End: 2024-10-29
Payer: COMMERCIAL

## 2024-10-29 VITALS
SYSTOLIC BLOOD PRESSURE: 114 MMHG | BODY MASS INDEX: 32.92 KG/M2 | HEIGHT: 63 IN | DIASTOLIC BLOOD PRESSURE: 84 MMHG | HEART RATE: 82 BPM | RESPIRATION RATE: 17 BRPM | WEIGHT: 185.8 LBS | OXYGEN SATURATION: 100 % | TEMPERATURE: 98.3 F

## 2024-10-29 DIAGNOSIS — E66.811 CLASS 1 OBESITY DUE TO EXCESS CALORIES WITHOUT SERIOUS COMORBIDITY WITH BODY MASS INDEX (BMI) OF 32.0 TO 32.9 IN ADULT: ICD-10-CM

## 2024-10-29 DIAGNOSIS — E78.2 MIXED HYPERLIPIDEMIA: ICD-10-CM

## 2024-10-29 DIAGNOSIS — Z23 FLU VACCINE NEED: ICD-10-CM

## 2024-10-29 DIAGNOSIS — Z13.1 DIABETES MELLITUS SCREENING: ICD-10-CM

## 2024-10-29 DIAGNOSIS — Z00.00 ANNUAL PHYSICAL EXAM: Primary | ICD-10-CM

## 2024-10-29 DIAGNOSIS — Z13.29 THYROID DISORDER SCREEN: ICD-10-CM

## 2024-10-29 DIAGNOSIS — Z00.00 ANNUAL PHYSICAL EXAM: ICD-10-CM

## 2024-10-29 DIAGNOSIS — E66.09 CLASS 1 OBESITY DUE TO EXCESS CALORIES WITHOUT SERIOUS COMORBIDITY WITH BODY MASS INDEX (BMI) OF 32.0 TO 32.9 IN ADULT: ICD-10-CM

## 2024-10-29 LAB
ALBUMIN SERPL BCG-MCNC: 4.5 G/DL (ref 3.5–5)
ALP SERPL-CCNC: 64 U/L (ref 34–104)
ALT SERPL W P-5'-P-CCNC: 27 U/L (ref 7–52)
ANION GAP SERPL CALCULATED.3IONS-SCNC: 10 MMOL/L (ref 4–13)
AST SERPL W P-5'-P-CCNC: 25 U/L (ref 13–39)
BASOPHILS # BLD AUTO: 0.04 THOUSANDS/ΜL (ref 0–0.1)
BASOPHILS NFR BLD AUTO: 1 % (ref 0–1)
BILIRUB SERPL-MCNC: 0.63 MG/DL (ref 0.2–1)
BUN SERPL-MCNC: 8 MG/DL (ref 5–25)
CALCIUM SERPL-MCNC: 8.8 MG/DL (ref 8.4–10.2)
CHLORIDE SERPL-SCNC: 105 MMOL/L (ref 96–108)
CHOLEST SERPL-MCNC: 210 MG/DL
CO2 SERPL-SCNC: 26 MMOL/L (ref 21–32)
CREAT SERPL-MCNC: 0.7 MG/DL (ref 0.6–1.3)
EOSINOPHIL # BLD AUTO: 0.2 THOUSAND/ΜL (ref 0–0.61)
EOSINOPHIL NFR BLD AUTO: 3 % (ref 0–6)
ERYTHROCYTE [DISTWIDTH] IN BLOOD BY AUTOMATED COUNT: 13 % (ref 11.6–15.1)
EST. AVERAGE GLUCOSE BLD GHB EST-MCNC: 108 MG/DL
GFR SERPL CREATININE-BSD FRML MDRD: 92 ML/MIN/1.73SQ M
GLUCOSE P FAST SERPL-MCNC: 106 MG/DL (ref 65–99)
HBA1C MFR BLD: 5.4 %
HCT VFR BLD AUTO: 40.6 % (ref 34.8–46.1)
HDLC SERPL-MCNC: 51 MG/DL
HGB BLD-MCNC: 13.5 G/DL (ref 11.5–15.4)
IMM GRANULOCYTES # BLD AUTO: 0.02 THOUSAND/UL (ref 0–0.2)
IMM GRANULOCYTES NFR BLD AUTO: 0 % (ref 0–2)
LDLC SERPL CALC-MCNC: 118 MG/DL (ref 0–100)
LYMPHOCYTES # BLD AUTO: 2.13 THOUSANDS/ΜL (ref 0.6–4.47)
LYMPHOCYTES NFR BLD AUTO: 28 % (ref 14–44)
MCH RBC QN AUTO: 31 PG (ref 26.8–34.3)
MCHC RBC AUTO-ENTMCNC: 33.3 G/DL (ref 31.4–37.4)
MCV RBC AUTO: 93 FL (ref 82–98)
MONOCYTES # BLD AUTO: 0.5 THOUSAND/ΜL (ref 0.17–1.22)
MONOCYTES NFR BLD AUTO: 7 % (ref 4–12)
NEUTROPHILS # BLD AUTO: 4.69 THOUSANDS/ΜL (ref 1.85–7.62)
NEUTS SEG NFR BLD AUTO: 61 % (ref 43–75)
NONHDLC SERPL-MCNC: 159 MG/DL
NRBC BLD AUTO-RTO: 0 /100 WBCS
PLATELET # BLD AUTO: 243 THOUSANDS/UL (ref 149–390)
PMV BLD AUTO: 10.4 FL (ref 8.9–12.7)
POTASSIUM SERPL-SCNC: 4.1 MMOL/L (ref 3.5–5.3)
PROT SERPL-MCNC: 7.2 G/DL (ref 6.4–8.4)
RBC # BLD AUTO: 4.36 MILLION/UL (ref 3.81–5.12)
SODIUM SERPL-SCNC: 141 MMOL/L (ref 135–147)
TRIGL SERPL-MCNC: 203 MG/DL
TSH SERPL DL<=0.05 MIU/L-ACNC: 3.19 UIU/ML (ref 0.45–4.5)
WBC # BLD AUTO: 7.58 THOUSAND/UL (ref 4.31–10.16)

## 2024-10-29 PROCEDURE — 90471 IMMUNIZATION ADMIN: CPT

## 2024-10-29 PROCEDURE — 99396 PREV VISIT EST AGE 40-64: CPT

## 2024-10-29 PROCEDURE — 36415 COLL VENOUS BLD VENIPUNCTURE: CPT

## 2024-10-29 PROCEDURE — 80061 LIPID PANEL: CPT

## 2024-10-29 PROCEDURE — 80053 COMPREHEN METABOLIC PANEL: CPT

## 2024-10-29 PROCEDURE — 90673 RIV3 VACCINE NO PRESERV IM: CPT

## 2024-10-29 PROCEDURE — 85025 COMPLETE CBC W/AUTO DIFF WBC: CPT

## 2024-10-29 PROCEDURE — 84443 ASSAY THYROID STIM HORMONE: CPT

## 2024-10-29 PROCEDURE — 83036 HEMOGLOBIN GLYCOSYLATED A1C: CPT

## 2024-10-29 NOTE — PATIENT INSTRUCTIONS
"Patient Education     Routine physical for adults   The Basics   Written by the doctors and editors at [facility]   What is a physical? -- A physical is a routine visit, or \"check-up,\" with your doctor. You might also hear it called a \"wellness visit\" or \"preventive visit.\"  During each visit, the doctor will:   Ask about your physical and mental health   Ask about your habits, behaviors, and lifestyle   Do an exam   Give you vaccines if needed   Talk to you about any medicines you take   Give advice about your health   Answer your questions  Getting regular check-ups is an important part of taking care of your health. It can help your doctor find and treat any problems you have. But it's also important for preventing health problems.  A routine physical is different from a \"sick visit.\" A sick visit is when you see a doctor because of a health concern or problem. Since physicals are scheduled ahead of time, you can think about what you want to ask the doctor.  How often should I get a physical? -- It depends on your age and health. In general, for people age 21 years and older:   If you are younger than 50 years, you might be able to get a physical every 3 years.   If you are 50 years or older, your doctor might recommend a physical every year.  If you have an ongoing health condition, like diabetes or high blood pressure, your doctor will probably want to see you more often.  What happens during a physical? -- In general, each visit will include:   Physical exam - The doctor or nurse will check your height, weight, heart rate, and blood pressure. They will also look at your eyes and ears. They will ask about how you are feeling and whether you have any symptoms that bother you.   Medicines - It's a good idea to bring a list of all the medicines you take to each doctor visit. Your doctor will talk to you about your medicines and answer any questions. Tell them if you are having any side effects that bother you. You " "should also tell them if you are having trouble paying for any of your medicines.   Habits and behaviors - This includes:   Your diet   Your exercise habits   Whether you smoke, drink alcohol, or use drugs   Whether you are sexually active   Whether you feel safe at home  Your doctor will talk to you about things you can do to improve your health and lower your risk of health problems. They will also offer help and support. For example, if you want to quit smoking, they can give you advice and might prescribe medicines. If you want to improve your diet or get more physical activity, they can help you with this, too.   Lab tests, if needed - The tests you get will depend on your age and situation. For example, your doctor might want to check your:   Cholesterol   Blood sugar   Iron level   Vaccines - The recommended vaccines will depend on your age, health, and what vaccines you already had. Vaccines are very important because they can prevent certain serious or deadly infections.   Discussion of screening - \"Screening\" means checking for diseases or other health problems before they cause symptoms. Your doctor can recommend screening based on your age, risk, and preferences. This might include tests to check for:   Cancer, such as breast, prostate, cervical, ovarian, colorectal, prostate, lung, or skin cancer   Sexually transmitted infections, such as chlamydia and gonorrhea   Mental health conditions like depression and anxiety  Your doctor will talk to you about the different types of screening tests. They can help you decide which screenings to have. They can also explain what the results might mean.   Answering questions - The physical is a good time to ask the doctor or nurse questions about your health. If needed, they can refer you to other doctors or specialists, too.  Adults older than 65 years often need other care, too. As you get older, your doctor will talk to you about:   How to prevent falling at " home   Hearing or vision tests   Memory testing   How to take your medicines safely   Making sure that you have the help and support you need at home  All topics are updated as new evidence becomes available and our peer review process is complete.  This topic retrieved from [employer] on: May 02, 2024.  Topic 326010 Version 1.0  Release: 32.4.3 - C32.122  © 2024 UpToDate, Inc. and/or its affiliates. All rights reserved.  Consumer Information Use and Disclaimer   Disclaimer: This generalized information is a limited summary of diagnosis, treatment, and/or medication information. It is not meant to be comprehensive and should be used as a tool to help the user understand and/or assess potential diagnostic and treatment options. It does NOT include all information about conditions, treatments, medications, side effects, or risks that may apply to a specific patient. It is not intended to be medical advice or a substitute for the medical advice, diagnosis, or treatment of a health care provider based on the health care provider's examination and assessment of a patient's specific and unique circumstances. Patients must speak with a health care provider for complete information about their health, medical questions, and treatment options, including any risks or benefits regarding use of medications. This information does not endorse any treatments or medications as safe, effective, or approved for treating a specific patient. UpToDate, Inc. and its affiliates disclaim any warranty or liability relating to this information or the use thereof.The use of this information is governed by the Terms of Use, available at https://www.woltersToopheruwer.com/en/know/clinical-effectiveness-terms. 2024© UpToDate, Inc. and its affiliates and/or licensors. All rights reserved.  Copyright   © 2024 UpToDate, Inc. and/or its affiliates. All rights reserved.

## 2024-10-29 NOTE — PROGRESS NOTES
"Adult Annual Physical  Name: Lloyd Camejo      : 1960      MRN: [id number]  Encounter Provider: [facility] Regalado  Encounter Date: 10/29/2024   Encounter department: [facility]    Assessment & Plan  Annual physical exam    Orders:    Comprehensive metabolic panel; Future    CBC and differential; Future    Flu vaccine need    Orders:    influenza vaccine, recombinant, PF, 0.5 mL IM (Flublok)    Mixed hyperlipidemia    Orders:    Lipid panel; Future    Thyroid disorder screen    Orders:    TSH, 3rd generation with Free T4 reflex; Future    Diabetes mellitus screening    Orders:    Hemoglobin A1C; Future    Immunizations and preventive care screenings were discussed with patient today. Appropriate education was printed on patient's after visit summary.    Counseling:  Alcohol/drug use: discussed moderation in alcohol intake, the recommendations for healthy alcohol use, and avoidance of illicit drug use.  Dental Health: discussed importance of regular tooth brushing, flossing, and dental visits.  Exercise: the importance of regular exercise/physical activity was discussed. Recommend exercise 3-5 times per week for at least 30 minutes.       Depression Screening and Follow-up Plan: Patient was screened for depression during today's encounter. They screened negative with a PHQ-2 score of 0.        History of Present Illness     Adult Annual Physical:  Patient presents for annual physical. 63 year old female presents for annual physical exam.   She is still concerned with her weight, she is having difficulty losing weight and also gained about 10 lbs over the past 6 months. She exercises by walking, she usually walks more in the nicer warmer weather. Her diet is \"better\", she cut out a lot of carbs, eats fresh fruits and vegetables. She did try to count calories but is no longer doing this. She tried weight watchers in the past, went to weight management in the past and tried " "Topamax but discontinued it due to the numbness in her fingertips. She is scheduled to see weight management tomorrow. She will discuss further with them.   She had Inspire placed in January of this year for her NEDRA. She has had side effects and difficulties since starting with this and is following with Dr. Holland.   She is agreeable to influenza vaccine and fasting labs.  No concerns otherwise at this time..     Diet and Physical Activity:  - Diet/Nutrition: well balanced diet.  - Exercise: walking.    Depression Screening:  - PHQ-2 Score: 0    General Health:  - Sleep:. Has NEDRA, now has INSPIRE since 1/2024.  - Vision: wears glasses.  - Dental: no dental visits for > 1 year.    /GYN Health:    - Menopause: postmenopausal.     Review of Systems   Constitutional: Negative.    HENT: Negative.     Eyes: Negative.    Respiratory: Negative.     Cardiovascular: Negative.    Gastrointestinal: Negative.    Endocrine: Negative.    Genitourinary: Negative.    Musculoskeletal: Negative.    Skin: Negative.    Allergic/Immunologic: Negative.    Neurological: Negative.    Hematological: Negative.    Psychiatric/Behavioral: Negative.         Objective     /84 (BP Location: Left arm, Patient Position: Sitting, Cuff Size: Standard)   Pulse 82   Temp 98.3 °F (36.8 °C) (Temporal)   Resp 17   Ht 5' 3\" (1.6 m)   Wt 84.3 kg (185 lb 12.8 oz)   SpO2 100%   BMI 32.91 kg/m²     Physical Exam  Vitals and nursing note reviewed.   Constitutional:       General: She is not in acute distress.     Appearance: Normal appearance. She is well-developed. She is obese. She is not ill-appearing.   HENT:      Head: Normocephalic and atraumatic.      Right Ear: Tympanic membrane, ear canal and external ear normal.      Left Ear: Tympanic membrane, ear canal and external ear normal.      Nose: Nose normal.      Mouth/Throat:      Mouth: Mucous membranes are moist.      Pharynx: Oropharynx is clear.   Eyes:      General:         Right eye: No " discharge.         Left eye: No discharge.      Conjunctiva/sclera: Conjunctivae normal.      Pupils: Pupils are equal, round, and reactive to light.   Cardiovascular:      Rate and Rhythm: Normal rate and regular rhythm.      Pulses: Normal pulses.      Heart sounds: Normal heart sounds. No murmur heard.  Pulmonary:      Effort: Pulmonary effort is normal. No respiratory distress.      Breath sounds: Normal breath sounds.   Abdominal:      General: Abdomen is flat. Bowel sounds are normal.      Palpations: Abdomen is soft.      Tenderness: There is no abdominal tenderness.   Musculoskeletal:         General: No swelling. Normal range of motion.      Cervical back: Normal range of motion and neck supple.      Right lower leg: No edema.      Left lower leg: No edema.   Lymphadenopathy:      Cervical: No cervical adenopathy.   Skin:     General: Skin is warm and dry.      Capillary Refill: Capillary refill takes less than 2 seconds.   Neurological:      General: No focal deficit present.      Mental Status: She is alert and oriented to person, place, and time. Mental status is at baseline.   Psychiatric:         Mood and Affect: Mood normal.         Behavior: Behavior normal.         Thought Content: Thought content normal.

## 2024-10-30 ENCOUNTER — OFFICE VISIT (OUTPATIENT)
Dept: BARIATRICS | Facility: CLINIC | Age: 64
End: 2024-10-30
Payer: COMMERCIAL

## 2024-10-30 VITALS
HEIGHT: 64 IN | DIASTOLIC BLOOD PRESSURE: 80 MMHG | WEIGHT: 184.4 LBS | OXYGEN SATURATION: 99 % | SYSTOLIC BLOOD PRESSURE: 128 MMHG | BODY MASS INDEX: 31.48 KG/M2 | HEART RATE: 88 BPM

## 2024-10-30 DIAGNOSIS — E66.811 CLASS 1 OBESITY DUE TO EXCESS CALORIES WITH SERIOUS COMORBIDITY AND BODY MASS INDEX (BMI) OF 32.0 TO 32.9 IN ADULT: Primary | ICD-10-CM

## 2024-10-30 DIAGNOSIS — E66.09 CLASS 1 OBESITY DUE TO EXCESS CALORIES WITH SERIOUS COMORBIDITY AND BODY MASS INDEX (BMI) OF 32.0 TO 32.9 IN ADULT: Primary | ICD-10-CM

## 2024-10-30 PROCEDURE — 99204 OFFICE O/P NEW MOD 45 MIN: CPT | Performed by: NURSE PRACTITIONER

## 2024-10-30 RX ORDER — BUPROPION HYDROCHLORIDE 150 MG/1
TABLET ORAL
Qty: 60 TABLET | Refills: 2 | Status: SHIPPED | OUTPATIENT
Start: 2024-10-30

## 2024-10-30 RX ORDER — NALTREXONE HYDROCHLORIDE 50 MG/1
TABLET, FILM COATED ORAL
Qty: 30 TABLET | Refills: 2 | Status: SHIPPED | OUTPATIENT
Start: 2024-10-30 | End: 2024-11-04

## 2024-10-30 NOTE — PROGRESS NOTES
Assessment/Plan:    Class 1 obesity due to excess calories with serious comorbidity and body mass index (BMI) of 32.0 to 32.9 in adult  - Discussed options of HealthyCORE-Intensive Lifestyle Intervention Program, Very Low Calorie Diet-VLCD, and Conservative Program and the role of weight loss medications.  - Patient is interested in pursuing Conservative Program and follow up visits with medical weight management provider.  - Explained the importance of making lifestyle changes in addition to starting any anti-obesity medications.   - Initial weight loss goal of 5-10% weight loss for improved health. Weight loss can improve patient's co-morbid conditions and/or prevent weight-related complications.  - Weight is not at goal and patient has been unable to achieve a meaningful weight loss above 5% using various programs and tools for more than 6 months  - Labs reviewed from 10/2024    General Recommendations:  Nutrition:  Eat breakfast daily.  Do not skip meals.      Food log (ie.) www.[employer].com, sparkpeople.com, loseit.com, calorieking.com, etc.     Practice mindful eating.  Be sure to set aside time to eat, eat slowly, and savor your food.     Hydration:    At least 64oz of water daily.  No sugar sweetened beverages.  No juice (eat the fruit instead).     Exercise:  Studies have shown that the ideal exercise goal is somewhere between 150 to 300 minutes of moderate intensity exercise a week.  Start with exercising 10 minutes every other day and gradually increase physical activity with a goal of at least 150 minutes of moderate intensity exercise a week, divided over at least 3 days a week.  An example of this would be exercising 30 minutes a day, 5 days a week.  Resistance training can increase muscle mass and increase our resting metabolic rate.   FULL BODY resistance training is recommended 2-3 times a week.  Do not do this on consecutive days to allow for muscle recovery.     Aim for a bare minimum 5000  Joshua Wilson  I received and reviewed your recent labs and everything looked good. Please reach out with any specific questions or concerns. Have a great day  -Gertrudis   steps, even on days you do not exercise.     Monitoring:   Weigh yourself daily.  If this causes undue stress, then just weigh yourself once a week.  Weigh yourself the same time of the day with the same amount of clothing on.  Preferably this should be done after waking up, before you eat, and with no clothing or minimal clothing on.     Specific Goals:  Calorie goal:  [id number] dileep/day   Patient lifestyle habits were reviewed and barriers to weight loss were addressed today.  Nutrition was discussed and patient will start to go back to the guidelines that she had from her previous experience with medical weight management.  She was given a high-protein snack list to incorporate more protein within her day and to utilize instead of snacking on other snacks at night.  Activity was discussed and patient was encouraged to continue to try to incorporate more activity into her week including 30 minutes of activity 2 to 3 days.  Medications were discussed:  Phentermine to be avoided due to history of insomnia  Topiramate was discussed and patient had side effects on this medication in the past  GLP-1 medications were discussed with patient will likely not qualify based on her insurance plan and is concerned about long-term effects of these medications so would like to avoid if possible  Metformin was discussed to help with insulin resistance and patient may consider this in the future  Bupropion/naltrexone was discussed and patient was interested in this medication to help her emotional eating and cravings later in the evening.  Bupropion/naltrexone instructions:  Week 1: bupropion 150mg ONCE daily in the evening  Week 2: bupropion 150mg TWICE daily in the morning and evening  Week 3: continue with bupropion twice daily and ADD naltrexone 25mg (1/2 tablet) once daily in the morning   Week 4: continue with bupropion twice daily and INCREASE the naltrexone 25mg (1/2 tablet) twice daily in the morning and dinner      The  potential side effects of bupropion/naltrexone may include: abdominal upset, headache, dizziness, trouble sleeping, increased blood pressure, depression/anxiety, and fatigue. Please call/return if you develops symptoms of depression or anxiety. You should go to the  ER for evaluation if you develop any thoughts of harming yourself or others occur.          Lloyd was seen today for follow-up.    Diagnoses and all orders for this visit:    Class 1 obesity due to excess calories with serious comorbidity and body mass index (BMI) of 32.0 to 32.9 in adult  -     buPROPion (Wellbutrin XL) 150 mg 24 hr tablet; Take 1 tablet once daily in the evening then after 1 week increase to 1 tablet twice daily in the morning and evening  -     naltrexone (REVIA) 50 mg tablet; Take 1 tablet by mouth once daily in the evening for 1 week then increase to 1 tablet twice daily in the morning and evening - start after 2 weeks on bupropion         Patient denies personal history of pancreatitis. Patient also denies personal and family history of medullary thyroid cancer and multiple endocrine neoplasia type 2 (MEN 2 tumor). Patient denies any history of kidney stones, seizures, or glaucoma.      Total time spent reviewing chart, interviewing patient, examining patient, discussing plan, answering all questions, and documentin min, with >50% face-to-face time spent counseling patient on nonsurgical interventions for the treatment of excess weight. Discussed in detail nonsurgical options including intensive lifestyle intervention program, very low-calorie diet program and conservative program.  Discussed the role of weight loss medications.  Counseled patient on diet behavior and exercise modification for weight loss.    Follow up in approximately 3 months with Non-Surgical Physician/Advanced Practitioner.    Subjective:   Chief Complaint   Patient presents with    Follow-up     Pt is here for MWM f/u       Patient ID: Lloyd TORRES Amoroso  is  a 63 y.o. female with excess weight/obesity here to pursue weight management.  Previous notes and records have been reviewed.    Past Medical History:   Diagnosis Date    Abnormal Pap smear of cervix     Anxiety 5/12/2020    BRCA1 negative     Breast cancer (HCC) 02/06/2009    right breast    Cancer (HCC)     Right breast cancer R lymph node bx neg. Last assessed: 6/4/13    Carpal tunnel syndrome     Chronic kidney disease     History of radiation therapy 03/2009    Kidney stone     Last assessed: 1/7/16    Major depressive disorder with single episode, in full remission (HCC) 9/5/2018    Obesity (BMI 30.0-34.9)     Osteoporosis     Sleep apnea     Patient can't use CPAP     Past Surgical History:   Procedure Laterality Date    BLADDER SUSPENSION      BREAST BIOPSY Right 2009    BREAST LUMPECTOMY Right 02/06/2009    malignant    CHOLECYSTECTOMY      COLONOSCOPY      EXAMINATION UNDER ANESTHESIA N/A 8/28/2023    Procedure: DRUG INDUCED SLEEP ENDOSCOPY;  Surgeon: Rashawn Hutchins MD;  Location: [facility];  Service: ENT    HAND SURGERY      LITHOTRIPSY Left     MAMMO STEREOTACTIC BREAST BIOPSY LEFT (ALL INC) Left 7/12/2023    MD ARTHRP INTERPOS INTERCARPAL/METACARPAL JOINTS Left 10/23/2019    Procedure: THUMB SUSPENSIONPLASTY;  Surgeon: Festus Brady MD;  Location: [facility];  Service: Orthopedics    MD COLONOSCOPY FLX DX W/COLLJ SPEC WHEN PFRMD N/A 7/10/2017    Procedure: COLONOSCOPY;  Surgeon: Jamel Torres MD;  Location: [facility];  Service: Gastroenterology    MD EXC B9 LESION MRGN XCP SK TG T/A/L 0.6-1.0 CM Left 10/23/2019    Procedure: UPPER EXTREMITY LESION/MASS EXCISION BIOPSY;  Surgeon: Festus Brady MD;  Location[facility];  Service: Orthopedics    MD NDSC WRST SURG W/RLS TRANSVRS CARPL LIGM Left 10/3/2018    Procedure: ENDOSCOPIC CARPAL TUNNEL RELEASE;  Surgeon: Festus Brady MD;  Location[facility];  Service: Orthopedics    SENTINEL LYMPH NODE BIOPSY      WRIST SURGERY         HPI:  Wt Readings  from Last 20 Encounters:   10/30/24 83.6 kg (184 lb 6.4 oz)   10/29/24 84.3 kg (185 lb 12.8 oz)   10/24/24 83.9 kg (185 lb)   07/09/24 83.6 kg (184 lb 6.4 oz)   06/18/24 81.6 kg (180 lb)   05/30/24 81.6 kg (180 lb)   02/07/24 79.4 kg (175 lb)   01/23/24 79.4 kg (175 lb)   12/27/23 81.6 kg (180 lb)   12/01/23 80.7 kg (178 lb)   11/15/23 81.7 kg (180 lb 3.2 oz)   10/26/23 81.8 kg (180 lb 6.4 oz)   10/02/23 81.2 kg (179 lb)   09/01/23 80.3 kg (177 lb)   08/28/23 80.3 kg (177 lb)   08/09/23 81.2 kg (179 lb)   07/05/23 81.6 kg (180 lb)   06/19/23 77.1 kg (170 lb)   06/13/23 76.7 kg (169 lb)   01/30/23 76.7 kg (169 lb)       Patient presents today to medical weight management office for consult.  Patient had previously been involved with medical weight management in 2021 and completed the healthy core program.  She also tried topiramate to help with appetite suppression but developed tingling in her fingers so stopped the medication.  Since then she has tried other programs such as Golo, weight watchers, weight loss Gummies, and other self-made diets.  She has not been successful with any further weight loss and would like to discuss other options.  Her and her  have recently been more conscious about nutrition efforts by increasing their protein, reducing their carbohydrates, and increasing their fresh vegetables and fruit intake.  She states she has gained 7 pounds in the past 6 months with these changes.  Patient is concerned about her health long-term as both of her sisters are overweight as well and both have been diagnosed with diabetes.  She had recent blood work yesterday with her primary care doctor that was reviewed today.  Patient has obstructive sleep apnea and had the inspire device placed in January.  She states it does not working as efficiently as she would like it to work and is also taking trazodone for insomnia.  She feels her sleep is finally better controlled and she has more energy during the  day.    Starting MWM weight: 184.4 lbs  Starting BMI: 32.15  Starting Waist Measurement: 41 in  Goal weight: 145-150 lbs    Nutrition Prescription  Calories:1000 - 1100  Protein: 63 - 78  Fluid:62    Diet recall:  B: scrambled eggs and toast OR fried egg occasionally with toast OR cereal  L: salad and apple  D: protein with vegetable and starch (cutting back on starch)  S: licorice OR popcorn OR fritos  Take out frequency: not often    Hydration: sparkling water, fresca  Alcohol: occasionally  Smoking: no  Exercise: walks sometimes  Occupation: works from home  Sleep: well since starting trazodone, has NEDRA - had inspire surgery    Colonoscopy: 2017  Mammogram: 6/2024    The following portions of the patient's history were reviewed and updated as appropriate: allergies, current medications, past family history, past medical history, past social history, past surgical history, and problem list.    Family History   Problem Relation Age of Onset    Skin cancer Mother     Hypertension Mother     Heart disease Mother     Skin cancer Father     Hypertension Father     Prostate cancer Father 70        2 bouts    Asthma Father     Cancer Father     Heart attack Father     Heart disease Father     Diabetes Sister         Mellitus    Breast cancer Sister 44    Uterine cancer Sister 50    Skin cancer Sister     Colon polyps Sister         Sigmoid    Cancer Family     Thyroid disease Family         Disorder    Breast cancer Maternal Aunt 40        mets    Breast cancer Cousin     No Known Problems Daughter     No Known Problems Maternal Grandmother     No Known Problems Maternal Grandfather     No Known Problems Paternal Grandmother     No Known Problems Paternal Grandfather     No Known Problems Daughter     Heart attack Brother         Review of Systems   Constitutional:  Negative for fatigue.   HENT:  Negative for sore throat.    Respiratory:  Negative for cough and shortness of breath.    Cardiovascular:  Negative for chest  "pain, palpitations and leg swelling.   Gastrointestinal:  Negative for abdominal pain, constipation, diarrhea and nausea.   Genitourinary:  Negative for dysuria.   Musculoskeletal:  Negative for arthralgias and back pain.   Skin:  Negative for rash.   Neurological:  Negative for headaches.   Psychiatric/Behavioral:  Negative for dysphoric mood. The patient is not nervous/anxious.        Objective:  /80 (BP Location: Right arm, Patient Position: Sitting, Cuff Size: Large)   Pulse 88   Ht 5' 3.5\" (1.613 m)   Wt 83.6 kg (184 lb 6.4 oz)   SpO2 99%   BMI 32.15 kg/m²     Physical Exam  Vitals and nursing note reviewed.   Constitutional:       Appearance: Normal appearance. She is obese.   HENT:      Head: Normocephalic.   Pulmonary:      Effort: Pulmonary effort is normal.   Neurological:      General: No focal deficit present.      Mental Status: She is alert and oriented to person, place, and time.   Psychiatric:         Mood and Affect: Mood normal.         Behavior: Behavior normal.         Thought Content: Thought content normal.         Judgment: Judgment normal.              Labs and Imaging  Recent labs and imaging have been personally reviewed.  Lab Results   Component Value Date    WBC 7.58 10/29/2024    HGB 13.5 10/29/2024    HCT 40.6 10/29/2024    MCV 93 10/29/2024     10/29/2024     Lab Results   Component Value Date    SODIUM 141 10/29/2024    K 4.1 10/29/2024     10/29/2024    CO2 26 10/29/2024    AGAP 10 10/29/2024    BUN 8 10/29/2024    CREATININE 0.70 10/29/2024    GLUC 90 08/09/2023    GLUF 106 (H) 10/29/2024    CALCIUM 8.8 10/29/2024    AST 25 10/29/2024    ALT 27 10/29/2024    ALKPHOS 64 10/29/2024    TP 7.2 10/29/2024    TBILI 0.63 10/29/2024    EGFR 92 10/29/2024     Lab Results   Component Value Date    HGBA1C 5.4 10/29/2024     Lab Results   Component Value Date    RPU3RTITQBKL 3.195 10/29/2024     Lab Results   Component Value Date    CHOLESTEROL 210 (H) 10/29/2024     Lab " Results   Component Value Date    HDL 51 10/29/2024     Lab Results   Component Value Date    TRIG 203 (H) 10/29/2024     Lab Results   Component Value Date    LDLCALC 118 (H) 10/29/2024

## 2024-10-30 NOTE — PATIENT INSTRUCTIONS
Bupropion/naltrexone instructions:  Week 1: bupropion 150mg ONCE daily in the evening  Week 2: bupropion 150mg TWICE daily in the morning and evening  Week 3: continue with bupropion twice daily and ADD naltrexone 25mg (1/2 tablet) once daily in the morning   Week 4: continue with bupropion twice daily and INCREASE the naltrexone 25mg (1/2 tablet) twice daily in the morning and dinner      The potential side effects of bupropion/naltrexone may include: abdominal upset, headache, dizziness, trouble sleeping, increased blood pressure, depression/anxiety, and fatigue. Please call/return if you develops symptoms of depression or anxiety. You should go to the  ER for evaluation if you develop any thoughts of harming yourself or others occur.

## 2024-10-31 ENCOUNTER — TELEPHONE (OUTPATIENT)
Age: 64
End: 2024-10-31

## 2024-10-31 NOTE — TELEPHONE ENCOUNTER
Received call from [facility] pharmacy to clarify patients Wellbutrin order from [facility].    Confirmed that patient is to take 150mg daily x week and then increase to 150mg BID in the morning and the evening.  Referred not only to the order placed but also to the office visit notes.    No further questions

## 2024-11-04 DIAGNOSIS — E66.09 CLASS 1 OBESITY DUE TO EXCESS CALORIES WITH SERIOUS COMORBIDITY AND BODY MASS INDEX (BMI) OF 32.0 TO 32.9 IN ADULT: ICD-10-CM

## 2024-11-04 DIAGNOSIS — E66.811 CLASS 1 OBESITY DUE TO EXCESS CALORIES WITH SERIOUS COMORBIDITY AND BODY MASS INDEX (BMI) OF 32.0 TO 32.9 IN ADULT: ICD-10-CM

## 2024-11-04 DIAGNOSIS — F51.04 PSYCHOPHYSIOLOGICAL INSOMNIA: ICD-10-CM

## 2024-11-04 RX ORDER — NALTREXONE HYDROCHLORIDE 50 MG/1
TABLET, FILM COATED ORAL
Start: 2024-11-04 | End: 2024-11-13 | Stop reason: SDUPTHER

## 2024-11-13 DIAGNOSIS — E66.09 CLASS 1 OBESITY DUE TO EXCESS CALORIES WITH SERIOUS COMORBIDITY AND BODY MASS INDEX (BMI) OF 32.0 TO 32.9 IN ADULT: ICD-10-CM

## 2024-11-13 DIAGNOSIS — E66.811 CLASS 1 OBESITY DUE TO EXCESS CALORIES WITH SERIOUS COMORBIDITY AND BODY MASS INDEX (BMI) OF 32.0 TO 32.9 IN ADULT: ICD-10-CM

## 2024-11-13 RX ORDER — NALTREXONE HYDROCHLORIDE 50 MG/1
TABLET, FILM COATED ORAL
Qty: 30 TABLET | Refills: 1 | Status: SHIPPED | OUTPATIENT
Start: 2024-11-13

## 2024-11-13 RX ORDER — BUPROPION HYDROCHLORIDE 150 MG/1
TABLET ORAL
Qty: 60 TABLET | Refills: 2 | Status: SHIPPED | OUTPATIENT
Start: 2024-11-13 | End: 2024-11-20 | Stop reason: SDUPTHER

## 2024-11-19 ENCOUNTER — TELEPHONE (OUTPATIENT)
Age: 64
End: 2024-11-19

## 2024-11-19 NOTE — TELEPHONE ENCOUNTER
[facility] for   buPROPion (Wellbutrin XL) 150 mg 24 hr tablet   And   naltrexone (REVIA) 50 mg tablet     Patient wants medication [facility]    Please call Melanie at [phone]

## 2024-11-20 DIAGNOSIS — E66.811 CLASS 1 OBESITY DUE TO EXCESS CALORIES WITH SERIOUS COMORBIDITY AND BODY MASS INDEX (BMI) OF 32.0 TO 32.9 IN ADULT: ICD-10-CM

## 2024-11-20 DIAGNOSIS — E66.09 CLASS 1 OBESITY DUE TO EXCESS CALORIES WITH SERIOUS COMORBIDITY AND BODY MASS INDEX (BMI) OF 32.0 TO 32.9 IN ADULT: ICD-10-CM

## 2024-11-20 RX ORDER — BUPROPION HYDROCHLORIDE 150 MG/1
TABLET ORAL
Qty: 180 TABLET | Refills: 0 | Status: SHIPPED | OUTPATIENT
Start: 2024-11-20

## 2024-11-27 ENCOUNTER — TELEPHONE (OUTPATIENT)
Age: 64
End: 2024-11-27

## 2024-11-27 NOTE — TELEPHONE ENCOUNTER
[facility] warm transferred from [place] to this CTS-RN. Pharmacist clarifying Wellbutrin XL 150mg script. Script is written for BID, but XL is a daily medication. Clarifying if provider wants x2 TAB daily or SR form of Wellbutrin.     Please send new script.

## 2024-12-02 DIAGNOSIS — E66.09 CLASS 1 OBESITY DUE TO EXCESS CALORIES WITH SERIOUS COMORBIDITY AND BODY MASS INDEX (BMI) OF 32.0 TO 32.9 IN ADULT: ICD-10-CM

## 2024-12-02 DIAGNOSIS — E66.811 CLASS 1 OBESITY DUE TO EXCESS CALORIES WITH SERIOUS COMORBIDITY AND BODY MASS INDEX (BMI) OF 32.0 TO 32.9 IN ADULT: ICD-10-CM

## 2024-12-02 RX ORDER — BUPROPION HYDROCHLORIDE 150 MG/1
TABLET ORAL
Qty: 180 TABLET | Refills: 0 | Status: SHIPPED | OUTPATIENT
Start: 2024-12-02

## 2024-12-30 ENCOUNTER — OFFICE VISIT (OUTPATIENT)
Dept: SURGICAL ONCOLOGY | Facility: CLINIC | Age: 64
End: 2024-12-30
Payer: COMMERCIAL

## 2024-12-30 VITALS
OXYGEN SATURATION: 98 % | TEMPERATURE: 98.6 F | RESPIRATION RATE: 18 BRPM | WEIGHT: 172 LBS | HEART RATE: 58 BPM | HEIGHT: 64 IN | DIASTOLIC BLOOD PRESSURE: 84 MMHG | SYSTOLIC BLOOD PRESSURE: 134 MMHG | BODY MASS INDEX: 29.37 KG/M2

## 2024-12-30 DIAGNOSIS — Z86.000 HISTORY OF DUCTAL CARCINOMA IN SITU (DCIS) OF BREAST: Primary | ICD-10-CM

## 2024-12-30 DIAGNOSIS — R92.8 ABNORMAL FINDING ON BREAST IMAGING: ICD-10-CM

## 2024-12-30 PROCEDURE — 99213 OFFICE O/P EST LOW 20 MIN: CPT

## 2024-12-30 NOTE — PROGRESS NOTES
Name: Lloyd Camejo      : 1960      MRN: [id number]  Encounter Provider: ALLY Tate  Encounter Date: 2024   Encounter department: CANCER [facility]  :  Assessment & Plan  History of ductal carcinoma in situ (DCIS) of breast  Lloyd Camejo is a 64 y.o female with a hx of breast cancer presenting for abnormal breast imaging. She has a history of right breast cancer diagnosed in . She underwent an excisional biopsy which revealed DCIS with microinvasion and ADH. She had a right lumpectomy with sentinel lymph node biopsy with Dr. Reese. She underwent whole breast radiation therapy and completed 5 years of tamoxifen. Patient had genetic testing which was negative. She has been on observation. There were no concerns on breast exam. She denies breast concerns today. She denies persistent headaches, bone pain, back pain, shortness of breath, or abdominal pain.       Abnormal finding on breast imaging  We have been following her for surveillance of possible left breast radial scar verses excision. She had a b/l dx mammo on 23 which was BIRADS 3. Left breast demonstrated expected post-biopsy change in the upper outer quadrant. Given the possible radial scar seen on pathology, continued surveillance imaging is recommended with a mammogram in 6 months. The previously described left breast cyst has resolved and no dedicated ultrasound follow-up is indicated.  She had a dx mammo of the left breast on 24 which was BIRADS 3- Overall stable appearance of the previously biopsied 2:00 left breast mass, yielding a radial scar. Recommend continued surveillance imaging. She is due for b/l dx mammo at this time. Order is placed and we will get this scheduled.   Orders:  •  Mammo diagnostic bilateral w 3d and cad; Future        History of Present Illness   Lloyd Camejo is a 64 y.o female with a hx of breast cancer presenting for abnormal breast imaging.  See breast imaging findings above. She denies breast concerns today. She denies persistent headaches, bone pain, back pain, shortness of breath, or abdominal pain. She mentions that she had the INSPIRE device placed in her right chest wall for sleep apnea in Jan of this year. She would like to have it removed. She is concerned about mammography with the device on her chest wall. I reassured her that this appears to be on her posterior chest wall should it should not interfere with imaging.          Review of Systems   Constitutional:  Negative for activity change, appetite change, fatigue and unexpected weight change.   Respiratory:  Negative for cough and shortness of breath.    Cardiovascular:  Negative for chest pain.   Gastrointestinal:  Negative for abdominal pain, diarrhea, nausea and vomiting.   Endocrine: Negative for heat intolerance.   Musculoskeletal:  Negative for arthralgias, back pain and myalgias.   Skin:  Negative for rash.   Neurological:  Negative for weakness and headaches.   Hematological:  Negative for adenopathy.    A complete review of systems is negative other than that noted above in the HPI.      Objective     ECOG    Physical Exam  Constitutional:       General: She is not in acute distress.     Appearance: Normal appearance.   Cardiovascular:      Rate and Rhythm: Normal rate and regular rhythm.      Pulses: Normal pulses.      Heart sounds: Normal heart sounds.   Pulmonary:      Effort: Pulmonary effort is normal.      Breath sounds: Normal breath sounds.   Chest:      Chest wall: No mass.   Breasts:     Right: No swelling, bleeding, inverted nipple, mass, nipple discharge, skin change or tenderness.      Left: No swelling, bleeding, inverted nipple, mass, nipple discharge, skin change or tenderness.       Abdominal:      General: Abdomen is flat.      Palpations: Abdomen is soft.   Lymphadenopathy:      Upper Body:      Right upper body: No supraclavicular, axillary or pectoral  adenopathy.      Left upper body: No supraclavicular, axillary or pectoral adenopathy.   Skin:     General: Skin is warm.   Neurological:      General: No focal deficit present.      Mental Status: She is alert and oriented to person, place, and time.   Psychiatric:         Mood and Affect: Mood normal.         Behavior: Behavior normal.         Labs: I have reviewed pertinent labs.  No visits with results within 1 Month(s) from this visit.   Latest known visit with results is:   Appointment on 10/29/2024   Component Date Value Ref Range Status   • Sodium 10/29/2024 141  135 - 147 mmol/L Final   • Potassium 10/29/2024 4.1  3.5 - 5.3 mmol/L Final   • Chloride 10/29/2024 105  96 - 108 mmol/L Final   • CO2 10/29/2024 26  21 - 32 mmol/L Final   • ANION GAP 10/29/2024 10  4 - 13 mmol/L Final   • BUN 10/29/2024 8  5 - 25 mg/dL Final   • Creatinine 10/29/2024 0.70  0.60 - 1.30 mg/dL Final    Standardized to IDMS reference method   • Glucose, Fasting 10/29/2024 106 (H)  65 - 99 mg/dL Final   • Calcium 10/29/2024 8.8  8.4 - 10.2 mg/dL Final   • AST 10/29/2024 25  13 - 39 U/L Final   • ALT 10/29/2024 27  7 - 52 U/L Final    Specimen collection should occur prior to Sulfasalazine administration due to the potential for falsely depressed results.    • Alkaline Phosphatase 10/29/2024 64  34 - 104 U/L Final   • Total Protein 10/29/2024 7.2  6.4 - 8.4 g/dL Final   • Albumin 10/29/2024 4.5  3.5 - 5.0 g/dL Final   • Total Bilirubin 10/29/2024 0.63  0.20 - 1.00 mg/dL Final    Use of this assay is not recommended for patients undergoing treatment with eltrombopag due to the potential for falsely elevated results.  N-acetyl-p-benzoquinone imine (metabolite of Acetaminophen) will generate erroneously low results in samples for patients that have taken an overdose of Acetaminophen.   • eGFR 10/29/2024 92  ml/min/1.73sq m Final   • WBC 10/29/2024 7.58  4.31 - 10.16 Thousand/uL Final   • RBC 10/29/2024 4.36  3.81 - 5.12 Million/uL Final    • Hemoglobin 10/29/2024 13.5  11.5 - 15.4 g/dL Final   • Hematocrit 10/29/2024 40.6  34.8 - 46.1 % Final   • MCV 10/29/2024 93  82 - 98 fL Final   • MCH 10/29/2024 31.0  26.8 - 34.3 pg Final   • MCHC 10/29/2024 33.3  31.4 - 37.4 g/dL Final   • RDW 10/29/2024 13.0  11.6 - 15.1 % Final   • MPV 10/29/2024 10.4  8.9 - 12.7 fL Final   • Platelets 10/29/2024 243  149 - 390 Thousands/uL Final   • nRBC 10/29/2024 0  /100 WBCs Final   • Segmented % 10/29/2024 61  43 - 75 % Final   • Immature Grans % 10/29/2024 0  0 - 2 % Final   • Lymphocytes % 10/29/2024 28  14 - 44 % Final   • Monocytes % 10/29/2024 7  4 - 12 % Final   • Eosinophils Relative 10/29/2024 3  0 - 6 % Final   • Basophils Relative 10/29/2024 1  0 - 1 % Final   • Absolute Neutrophils 10/29/2024 4.69  1.85 - 7.62 Thousands/µL Final   • Absolute Immature Grans 10/29/2024 0.02  0.00 - 0.20 Thousand/uL Final   • Absolute Lymphocytes 10/29/2024 2.13  0.60 - 4.47 Thousands/µL Final   • Absolute Monocytes 10/29/2024 0.50  0.17 - 1.22 Thousand/µL Final   • Eosinophils Absolute 10/29/2024 0.20  0.00 - 0.61 Thousand/µL Final   • Basophils Absolute 10/29/2024 0.04  0.00 - 0.10 Thousands/µL Final   • Cholesterol 10/29/2024 210 (H)  See Comment mg/dL Final    Cholesterol:         Pediatric <18 Years        Desirable          <170 mg/dL      Borderline High    170-199 mg/dL      High               >=200 mg/dL        Adult >=18 Years            Desirable         <200 mg/dL      Borderline High   200-239 mg/dL      High              >239 mg/dL     • Triglycerides 10/29/2024 203 (H)  See Comment mg/dL Final    Triglyceride:     0-9Y            <75mg/dL     10Y-17Y         <90 mg/dL       >=18Y     Normal          <150 mg/dL     Borderline High 150-199 mg/dL     High            200-499 mg/dL        Very High       >499 mg/dL    Specimen collection should occur prior to Metamizole administration due to the potential for falsely depressed results.   • HDL, Direct 10/29/2024 51   >=50 mg/dL Final   • LDL Calculated 10/29/2024 118 (H)  0 - 100 mg/dL Final    LDL Cholesterol:     Optimal           <100 mg/dl     Near Optimal      100-129 mg/dl     Above Optimal       Borderline High 130-159 mg/dl       High            160-189 mg/dl       Very High       >189 mg/dl         This screening LDL is a calculated result.   It does not have the accuracy of the Direct Measured LDL in the monitoring of patients with hyperlipidemia and/or statin therapy.   Direct Measure LDL (RQR568) must be ordered separately in these patients.   • Non-HDL-Chol (CHOL-HDL) 10/29/2024 159  mg/dl Final   • TSH 3RD GENERATON 10/29/2024 3.195  0.450 - 4.500 uIU/mL Final    The recommended reference ranges for TSH during pregnancy are as follows:   First trimester 0.100 to 2.500 uIU/mL   Second trimester  0.200 to 3.000 uIU/mL   Third trimester 0.300 to 3.000 uIU/m    Note: Normal ranges may not apply to patients who are transgender, non-binary, or whose legal sex, sex at birth, and gender identity differ.  Adult TSH (3rd generation) reference range follows the recommended guidelines of the American Thyroid Association, January, 2020.   • Hemoglobin A1C 10/29/2024 5.4  Normal 4.0-5.6%; PreDiabetic 5.7-6.4%; Diabetic >=6.5%; Glycemic control for adults with diabetes <7.0% % Final   • EAG 10/29/2024 108  mg/dl Final

## 2024-12-30 NOTE — ASSESSMENT & PLAN NOTE
We have been following her for surveillance of possible left breast radial scar verses excision. She had a b/l dx mammo on 12/14/23 which was BIRADS 3. Left breast demonstrated expected post-biopsy change in the upper outer quadrant. Given the possible radial scar seen on pathology, continued surveillance imaging is recommended with a mammogram in 6 months. The previously described left breast cyst has resolved and no dedicated ultrasound follow-up is indicated.  She had a dx mammo of the left breast on 6/18/24 which was BIRADS 3- Overall stable appearance of the previously biopsied 2:00 left breast mass, yielding a radial scar. Recommend continued surveillance imaging. She is due for b/l dx mammo at this time. Order is placed and we will get this scheduled.   Orders:  •  Mammo diagnostic bilateral w 3d and cad; Future

## 2024-12-30 NOTE — ASSESSMENT & PLAN NOTE
Lloyd Camejo is a 64 y.o female with a hx of breast cancer presenting for abnormal breast imaging. She has a history of right breast cancer diagnosed in 2009. She underwent an excisional biopsy which revealed DCIS with microinvasion and ADH. She had a right lumpectomy with sentinel lymph node biopsy with Dr. Reese. She underwent whole breast radiation therapy and completed 5 years of tamoxifen. Patient had genetic testing which was negative. She has been on observation. There were no concerns on breast exam. She denies breast concerns today. She denies persistent headaches, bone pain, back pain, shortness of breath, or abdominal pain.

## 2025-01-30 ENCOUNTER — OFFICE VISIT (OUTPATIENT)
Dept: BARIATRICS | Facility: CLINIC | Age: 65
End: 2025-01-30
Payer: COMMERCIAL

## 2025-01-30 VITALS
SYSTOLIC BLOOD PRESSURE: 128 MMHG | WEIGHT: 164 LBS | DIASTOLIC BLOOD PRESSURE: 70 MMHG | HEIGHT: 64 IN | HEART RATE: 93 BPM | BODY MASS INDEX: 28 KG/M2 | OXYGEN SATURATION: 99 %

## 2025-01-30 DIAGNOSIS — E66.811 CLASS 1 OBESITY DUE TO EXCESS CALORIES WITH SERIOUS COMORBIDITY AND BODY MASS INDEX (BMI) OF 32.0 TO 32.9 IN ADULT: ICD-10-CM

## 2025-01-30 DIAGNOSIS — E66.09 CLASS 1 OBESITY DUE TO EXCESS CALORIES WITH SERIOUS COMORBIDITY AND BODY MASS INDEX (BMI) OF 32.0 TO 32.9 IN ADULT: ICD-10-CM

## 2025-01-30 PROCEDURE — 99214 OFFICE O/P EST MOD 30 MIN: CPT | Performed by: NURSE PRACTITIONER

## 2025-01-30 RX ORDER — BUPROPION HYDROCHLORIDE 150 MG/1
150 TABLET ORAL EVERY MORNING
Qty: 90 TABLET | Refills: 1 | Status: SHIPPED | OUTPATIENT
Start: 2025-01-30 | End: 2025-02-07 | Stop reason: SDUPTHER

## 2025-01-30 NOTE — PROGRESS NOTES
Assessment/Plan:     Class 1 obesity due to excess calories with serious comorbidity and body mass index (BMI) of 32.0 to 32.9 in adult  - Patient is pursuing Conservative Program and follow up visits with medical weight management provider  - Initial weight loss goal of 5-10% weight loss for improved health. Weight loss can improve patient's co-morbid conditions and/or prevent weight-related complications.  - Explained the importance of continuing lifestyle changes in addition to any anti-obesity medications.   - Labs reviewed from 10/2024    General Recommendations:  Nutrition:  Eat breakfast daily.  Do not skip meals.      Food log (ie.) [email or web address], sparkpeople.com, loseit.com, [email or web address], etc.     Practice mindful eating.  Be sure to set aside time to eat, eat slowly, and savor your food.     Hydration:    At least 64oz of water daily.  No sugar sweetened beverages.  No juice (eat the fruit instead).     Exercise:  Studies have shown that the ideal exercise goal is somewhere between 150 to 300 minutes of moderate intensity exercise a week.  Start with exercising 10 minutes every other day and gradually increase physical activity with a goal of at least 150 minutes of moderate intensity exercise a week, divided over at least 3 days a week.  An example of this would be exercising 30 minutes a day, 5 days a week.  Resistance training can increase muscle mass and increase our resting metabolic rate.   FULL BODY resistance training is recommended 2-3 times a week.  Do not do this on consecutive days to allow for muscle recovery.     Aim for a bare minimum 5000 steps, even on days you do not exercise.     Monitoring:   Weigh yourself daily.  If this causes undue stress, then just weigh yourself once a week.  Weigh yourself the same time of the day with the same amount of clothing on.  Preferably this should be done after waking up, before you eat, and with no clothing or minimal clothing on.      Specific Goals:  Calorie goal:  3930-2857 dileep/day    Patient lifestyle habits were reviewed congratulated on her weight loss since last visit.  Nutrition was discussed and she will continue to stay consistent with her meal plan and balanced calorie distribution.  She will continue to avoid snacking throughout the day and will utilize fruits and proteins if she does snack in the evening.  Activity was discussed and she will continue with her daily walks and increased activity level.  Medications were discussed and patient will continue with bupropion 150 mg once daily as she is tolerating well and seeing successful weight loss.  Patient will follow-up in the office in 3 months to monitor her weight loss journey.         Lloyd was seen today for follow-up.    Diagnoses and all orders for this visit:    Class 1 obesity due to excess calories with serious comorbidity and body mass index (BMI) of 32.0 to 32.9 in adult  -     buPROPion (Wellbutrin XL) 150 mg 24 hr tablet; Take 1 tablet (150 mg total) by mouth every morning Take 1 tablet by mouth twice daily in the morning and evening        Total time spent reviewing chart, interviewing patient, examining patient, discussing plan, answering all questions, and documentin minutes with >50% face-to-face time with the patient.    Follow up in approximately 3 months with Non-Surgical Physician/Advanced Practitioner.    Subjective:   Chief Complaint   Patient presents with    Follow-up       Patient ID: Lloyd Camejo  is a 64 y.o. female with excess weight/obesity here to pursue weight management.  Patient is pursuing Conservative Program.   Most recent notes and records were reviewed.    HPI    Wt Readings from Last 20 Encounters:   25 74.4 kg (164 lb)   24 78 kg (172 lb)   10/30/24 83.6 kg (184 lb 6.4 oz)   10/29/24 84.3 kg (185 lb 12.8 oz)   10/24/24 83.9 kg (185 lb)   24 83.6 kg (184 lb 6.4 oz)   24 81.6 kg (180 lb)   24 81.6 kg (180  "lb)   02/07/24 79.4 kg (175 lb)   01/23/24 79.4 kg (175 lb)   12/27/23 81.6 kg (180 lb)   12/01/23 80.7 kg (178 lb)   11/15/23 81.7 kg (180 lb 3.2 oz)   10/26/23 81.8 kg (180 lb 6.4 oz)   10/02/23 81.2 kg (179 lb)   09/01/23 80.3 kg (177 lb)   08/28/23 80.3 kg (177 lb)   08/09/23 81.2 kg (179 lb)   07/05/23 81.6 kg (180 lb)   06/19/23 77.1 kg (170 lb)       Patient presents today to medical weight management office for follow up.  Patient was started on bupropion 150 mg once daily at last office visit.  She was also started on naltrexone but did not tolerate this medication as she ended up vomiting.  She has remained on bupropion as has not seen successful weight loss.  Patient states she feels \"amazing\" on this medication.  She feels that it has helped her sleep, her energy, her depression, as well as her cravings.  She also feels that the psoriasis patch in the back of her head has improved since starting on this treatment.  Patient is very happy with her results and feels she has her health back on track.    Weight loss medication and dose: bupropion 150mg   Started weight and date: 184.4 lbs in 10/2024  Current weight: 164 lbs  Difference: -20.4 lbs  Percentage of weight loss: -11%  Goal weight: 145-150 lbs    Starting BMI: 32.15 in 10/2024  Current BMI: 28.6    Waist Measurements:  10/2024 41 in  1/2025: 36.5    Nutrition Prescription  Calories:1000 - 1100  Protein: 63 - 78  Fluid:62    Diet recall:  B: scrambled eggs and toast OR fried egg occasionally with toast OR cereal  L: salad and apple  D: protein with vegetable and starch (cutting back on starch)  S: sometimes fruit  Take out frequency: not often    Hydration: sparkling water, fresca  Alcohol: occasionally  Smoking: no  Exercise: walks sometimes  Occupation: works from home  Sleep: well since starting trazodone, has NEDRA - had inspire surgery    Colonoscopy: 2017  Mammogram: 6/2024      The following portions of the patient's history were reviewed and " "updated as appropriate: allergies, current medications, past family history, past medical history, past social history, past surgical history, and problem list.    Family History   Problem Relation Age of Onset    Skin cancer Mother     Hypertension Mother     Heart disease Mother     Arthritis Mother     Dementia Mother     Stroke Mother     Skin cancer Father     Hypertension Father     Prostate cancer Father 70        2 bouts    Asthma Father     Cancer Father     Heart attack Father     Heart disease Father     COPD Father     Diabetes Sister         Mellitus    Breast cancer Sister 44    Uterine cancer Sister 50    Skin cancer Sister     Colon polyps Sister         Sigmoid    Cancer Family     Thyroid disease Family         Disorder    Breast cancer Maternal Aunt 40        mets    Breast cancer Cousin     No Known Problems Daughter     No Known Problems Maternal Grandmother     No Known Problems Maternal Grandfather     No Known Problems Paternal Grandmother     No Known Problems Paternal Grandfather     No Known Problems Daughter     Heart attack Brother         Review of Systems   Constitutional:  Negative for fatigue.   HENT:  Negative for sore throat.    Respiratory:  Negative for cough and shortness of breath.    Cardiovascular:  Negative for chest pain, palpitations and leg swelling.   Gastrointestinal:  Negative for abdominal pain, constipation, diarrhea and nausea.   Genitourinary:  Negative for dysuria.   Musculoskeletal:  Negative for arthralgias and back pain.   Skin:  Negative for rash.   Neurological:  Negative for headaches.   Psychiatric/Behavioral:  Negative for dysphoric mood. The patient is not nervous/anxious.        Objective:  /70   Pulse 93   Ht 5' 3.5\" (1.613 m)   Wt 74.4 kg (164 lb)   SpO2 99%   BMI 28.60 kg/m²     Physical Exam  Vitals and nursing note reviewed.   Constitutional:       Appearance: Normal appearance. She is obese.   HENT:      Head: Normocephalic.   Pulmonary: "      Effort: Pulmonary effort is normal.   Neurological:      General: No focal deficit present.      Mental Status: She is alert and oriented to person, place, and time.   Psychiatric:         Mood and Affect: Mood normal.         Behavior: Behavior normal.         Thought Content: Thought content normal.         Judgment: Judgment normal.            Labs   Most recent labs reviewed   Lab Results   Component Value Date    SODIUM 141 10/29/2024    K 4.1 10/29/2024     10/29/2024    CO2 26 10/29/2024    AGAP 10 10/29/2024    BUN 8 10/29/2024    CREATININE 0.70 10/29/2024    GLUC 90 08/09/2023    GLUF 106 (H) 10/29/2024    CALCIUM 8.8 10/29/2024    AST 25 10/29/2024    ALT 27 10/29/2024    ALKPHOS 64 10/29/2024    TP 7.2 10/29/2024    TBILI 0.63 10/29/2024    EGFR 92 10/29/2024     Lab Results   Component Value Date    HGBA1C 5.4 10/29/2024     Lab Results   Component Value Date    ERG6EQHDKOUA 3.195 10/29/2024     Lab Results   Component Value Date    CHOLESTEROL 210 (H) 10/29/2024     Lab Results   Component Value Date    HDL 51 10/29/2024     Lab Results   Component Value Date    TRIG 203 (H) 10/29/2024     Lab Results   Component Value Date    LDLCALC 118 (H) 10/29/2024

## 2025-01-30 NOTE — ASSESSMENT & PLAN NOTE
- Patient is pursuing Conservative Program and follow up visits with medical weight management provider  - Initial weight loss goal of 5-10% weight loss for improved health. Weight loss can improve patient's co-morbid conditions and/or prevent weight-related complications.  - Explained the importance of continuing lifestyle changes in addition to any anti-obesity medications.   - Labs reviewed from 10/2024    General Recommendations:  Nutrition:  Eat breakfast daily.  Do not skip meals.      Food log (ie.) www.[employer].com, sparkpeople.com, loseit.com, calorieking.com, etc.     Practice mindful eating.  Be sure to set aside time to eat, eat slowly, and savor your food.     Hydration:    At least 64oz of water daily.  No sugar sweetened beverages.  No juice (eat the fruit instead).     Exercise:  Studies have shown that the ideal exercise goal is somewhere between 150 to 300 minutes of moderate intensity exercise a week.  Start with exercising 10 minutes every other day and gradually increase physical activity with a goal of at least 150 minutes of moderate intensity exercise a week, divided over at least 3 days a week.  An example of this would be exercising 30 minutes a day, 5 days a week.  Resistance training can increase muscle mass and increase our resting metabolic rate.   FULL BODY resistance training is recommended 2-3 times a week.  Do not do this on consecutive days to allow for muscle recovery.     Aim for a bare minimum 5000 steps, even on days you do not exercise.     Monitoring:   Weigh yourself daily.  If this causes undue stress, then just weigh yourself once a week.  Weigh yourself the same time of the day with the same amount of clothing on.  Preferably this should be done after waking up, before you eat, and with no clothing or minimal clothing on.     Specific Goals:  Calorie goal:  3815-5037 dileep/day    Patient lifestyle habits were reviewed congratulated on her weight loss since last visit.   Nutrition was discussed and she will continue to stay consistent with her meal plan and balanced calorie distribution.  She will continue to avoid snacking throughout the day and will utilize fruits and proteins if she does snack in the evening.  Activity was discussed and she will continue with her daily walks and increased activity level.  Medications were discussed and patient will continue with bupropion 150 mg once daily as she is tolerating well and seeing successful weight loss.  Patient will follow-up in the office in 3 months to monitor her weight loss journey.

## 2025-02-07 DIAGNOSIS — E66.09 CLASS 1 OBESITY DUE TO EXCESS CALORIES WITH SERIOUS COMORBIDITY AND BODY MASS INDEX (BMI) OF 32.0 TO 32.9 IN ADULT: ICD-10-CM

## 2025-02-07 DIAGNOSIS — E66.811 CLASS 1 OBESITY DUE TO EXCESS CALORIES WITH SERIOUS COMORBIDITY AND BODY MASS INDEX (BMI) OF 32.0 TO 32.9 IN ADULT: ICD-10-CM

## 2025-02-07 RX ORDER — BUPROPION HYDROCHLORIDE 150 MG/1
150 TABLET ORAL EVERY MORNING
Qty: 90 TABLET | Refills: 1 | Status: SHIPPED | OUTPATIENT
Start: 2025-02-07 | End: 2025-08-06

## 2025-02-12 ENCOUNTER — TELEPHONE (OUTPATIENT)
Age: 65
End: 2025-02-12

## 2025-02-12 NOTE — TELEPHONE ENCOUNTER
Called pt per request from inbasket message, to reschedule her NP appt w/Dr Jha 7/8. Pt's inbasket message asked to be r/s to November. Pt's appt was for skin check, w/papito hx.    Left message on voicemail for pt to return our call, so we can reschedule her.

## 2025-02-13 ENCOUNTER — HOSPITAL ENCOUNTER (OUTPATIENT)
Dept: RADIOLOGY | Facility: HOSPITAL | Age: 65
Discharge: HOME | End: 2025-02-13
Payer: COMMERCIAL

## 2025-02-13 VITALS — BODY MASS INDEX: 28 KG/M2 | WEIGHT: 164 LBS | HEIGHT: 64 IN

## 2025-02-13 DIAGNOSIS — R92.8 ABNORMAL FINDING ON BREAST IMAGING: ICD-10-CM

## 2025-02-13 PROCEDURE — G0279 TOMOSYNTHESIS, MAMMO: HCPCS

## 2025-02-13 PROCEDURE — 77066 DX MAMMO INCL CAD BI: CPT

## 2025-04-14 ENCOUNTER — PATIENT MESSAGE (OUTPATIENT)
Dept: FAMILY MEDICINE CLINIC | Facility: MEDICAL CENTER | Age: 65
End: 2025-04-14

## (undated) DEVICE — KNIFE RETROGRADE LIGAMENT

## (undated) DEVICE — PAD CAST 3 IN COTTON NON STRL

## (undated) DEVICE — BRUSH EZ SCRUB PCMX W/NAIL CLEANER

## (undated) DEVICE — GAUZE SPONGES,16 PLY: Brand: CURITY

## (undated) DEVICE — ACE WRAP 4 IN STERILE

## (undated) DEVICE — LIGHT HANDLE COVER SLEEVE DISP BLUE STELLAR

## (undated) DEVICE — INTENDED FOR TISSUE SEPARATION, AND OTHER PROCEDURES THAT REQUIRE A SHARP SURGICAL BLADE TO PUNCTURE OR CUT.: Brand: BARD-PARKER ® CARBON RIB-BACK BLADES

## (undated) DEVICE — GLOVE SRG BIOGEL 6.5

## (undated) DEVICE — CURITY NON-ADHERENT STRIPS: Brand: CURITY

## (undated) DEVICE — SPLINT 4 X 15 IN FAST SET PLASTER

## (undated) DEVICE — ASCOPE 4 RHINOLARYNGO SLIM: Brand: ASCOPE™ 4 RHINOLARYNGO SLIM

## (undated) DEVICE — TUBING SUCTION 5MM X 12 FT

## (undated) DEVICE — STRETCH BANDAGE: Brand: CURITY

## (undated) DEVICE — COBAN 2 IN UNSTERILE

## (undated) DEVICE — DRESSING ADAPTIC STRIPS

## (undated) DEVICE — ALL PURPOSE SPONGES,NONWOVEN, 4 PLY: Brand: CURITY

## (undated) DEVICE — SUT ETHILON 4-0 PS-2 18 IN 1667H

## (undated) DEVICE — CUFF TOURNIQUET 18 X 4 IN QUICK CONNECT DISP 1 BLADDER

## (undated) DEVICE — GLOVE SRG BIOGEL 8

## (undated) DEVICE — DISPOSABLE EQUIPMENT COVER: Brand: SMALL TOWEL DRAPE

## (undated) DEVICE — CAST PADDING 4 IN SYNTHETIC NON-STRL

## (undated) DEVICE — GLOVE INDICATOR PI UNDERGLOVE SZ 6.5 BLUE

## (undated) DEVICE — DRAPE SHEET THREE QUARTER

## (undated) DEVICE — C-WIRE PAK DOUBLE ENDED ORTHOPAEDIC WIRE, SPADE, .062" (1.57 MM)
Type: IMPLANTABLE DEVICE | Status: NON-FUNCTIONAL
Brand: C-WIRE
Removed: 2019-10-23

## (undated) DEVICE — PENCIL ELECTROSURG E-Z CLEAN -0035H

## (undated) DEVICE — SUT ETHIBOND 3-0 RB-1 30 IN MX552

## (undated) DEVICE — ACE WRAP 3 IN UNSTERILE

## (undated) DEVICE — PAD GROUNDING ADULT

## (undated) DEVICE — STERILE BETHLEHEM PLASTIC HAND: Brand: CARDINAL HEALTH

## (undated) DEVICE — NON-STERILE REUSABLE TOURNIQUET CUFF SINGLE BLADDER, DUAL PORT AND QUICK CONNECT CONNECTOR: Brand: COLOR CUFF

## (undated) DEVICE — DISPOSABLE OR TOWEL: Brand: CARDINAL HEALTH

## (undated) DEVICE — GLOVE INDICATOR PI UNDERGLOVE SZ 7.5 BLUE

## (undated) DEVICE — NEEDLE 25G X 1 1/2